# Patient Record
Sex: MALE | Race: WHITE | NOT HISPANIC OR LATINO
[De-identification: names, ages, dates, MRNs, and addresses within clinical notes are randomized per-mention and may not be internally consistent; named-entity substitution may affect disease eponyms.]

---

## 2017-01-03 ENCOUNTER — OTHER (OUTPATIENT)
Age: 67
End: 2017-01-03

## 2017-01-04 ENCOUNTER — APPOINTMENT (OUTPATIENT)
Dept: INFUSION THERAPY | Facility: CLINIC | Age: 67
End: 2017-01-04

## 2017-01-04 ENCOUNTER — APPOINTMENT (OUTPATIENT)
Dept: HEMATOLOGY ONCOLOGY | Facility: CLINIC | Age: 67
End: 2017-01-04

## 2017-01-04 VITALS
TEMPERATURE: 97.6 F | HEART RATE: 88 BPM | DIASTOLIC BLOOD PRESSURE: 75 MMHG | BODY MASS INDEX: 25.07 KG/M2 | SYSTOLIC BLOOD PRESSURE: 136 MMHG | WEIGHT: 156 LBS | HEIGHT: 66 IN

## 2017-01-05 LAB
ALBUMIN SERPL-MCNC: 3.7 G/DL
ALBUMIN/GLOB SERPL: 1.48
ALP SERPL-CCNC: 54 IU/L
ALT SERPL-CCNC: 8 IU/L
ANION GAP SERPL CALC-SCNC: 13 MEQ/L
AST SERPL-CCNC: 13 IU/L
BASOPHILS # BLD: 0.02 TH/MM3
BASOPHILS NFR BLD: 0.2 %
BILIRUB SERPL-MCNC: 0.6 MG/DL
BUN SERPL-MCNC: 75 MG/DL
BUN/CREAT SERPL: 11.6 %
CALCIUM SERPL-MCNC: 9.2 MG/DL
CHLORIDE SERPL-SCNC: 104 MEQ/L
CO2 SERPL-SCNC: 22 MEQ/L
CREAT SERPL-MCNC: 6.45 MG/DL
EOSINOPHIL # BLD: 0.23 TH/MM3
EOSINOPHIL NFR BLD: 2.8 %
ERYTHROCYTE [DISTWIDTH] IN BLOOD BY AUTOMATED COUNT: 15.5 %
GFR SERPL CREATININE-BSD FRML MDRD: 9
GLUCOSE SERPL-MCNC: 87 MG/DL
GRANULOCYTES # BLD: 5.47 TH/MM3
GRANULOCYTES NFR BLD: 66.6 %
HCT VFR BLD AUTO: 23.6 %
HGB BLD-MCNC: 7.8 G/DL
IMM GRANULOCYTES # BLD: 0.05 TH/MM3
IMM GRANULOCYTES NFR BLD: 0.6 %
LYMPHOCYTES # BLD: 1.61 TH/MM3
LYMPHOCYTES NFR BLD: 19.6 %
MCH RBC QN AUTO: 28.2 PG
MCHC RBC AUTO-ENTMCNC: 33.1 G/DL
MCV RBC AUTO: 85.2 FL
MONOCYTES # BLD: 0.84 TH/MM3
MONOCYTES NFR BLD: 10.2 %
PLATELET # BLD: 157 TH/MM3
PMV BLD AUTO: 10.8 FL
POTASSIUM SERPL-SCNC: 5.2 MMOL/L
PROT SERPL-MCNC: 6.2 G/DL
RBC # BLD AUTO: 2.77 MIL/MM3
SODIUM SERPL-SCNC: 139 MEQ/L
WBC # BLD: 8.22 TH/MM3

## 2017-01-06 LAB
KAPPA LC FREE SER-MCNC: 40.3 MG/L
KAPPA LC FREE/LAMBDA FREE SER: 0.79
LAMBDA LC FREE SERPL-MCNC: 51.2 MG/L

## 2017-01-12 ENCOUNTER — APPOINTMENT (OUTPATIENT)
Dept: INFUSION THERAPY | Facility: CLINIC | Age: 67
End: 2017-01-12

## 2017-01-12 LAB
ALBUMIN MFR SERPL ELPH: 60 %
ALBUMIN SERPL ELPH-MCNC: 3.8 G/DL
ALBUMIN/GLOB SERPL ELPH: 1.5 ZZ
ALPHA1 GLOB MFR SERPL ELPH: 6.5 %
ALPHA1 GLOB SERPL ELPH-MCNC: 0.4 G/DL
ALPHA2 GLOB MFR SERPL ELPH: 13.5 %
ALPHA2 GLOB SERPL ELPH-MCNC: 0.9 G/DL
B-GLOBULIN SERPL ELPH-MCNC: 0.6 G/DL
BASOPHILS # BLD: 0.04 TH/MM3
BASOPHILS NFR BLD: 0.5 %
BETA1 GLOB MFR SERPL ELPH: 9.7 %
EOSINOPHIL # BLD: 0.25 TH/MM3
EOSINOPHIL NFR BLD: 3.2 %
ERYTHROCYTE [DISTWIDTH] IN BLOOD BY AUTOMATED COUNT: 15.3 %
GAMMA GLOB MFR SERPL ELPH: 10.3 %
GAMMA GLOB SERPL ELPH-MCNC: 0.7 G/DL
GRANULOCYTES # BLD: 5.26 TH/MM3
GRANULOCYTES NFR BLD: 67.5 %
HCT VFR BLD AUTO: 23.7 %
HGB BLD-MCNC: 7.8 G/DL
IMM GRANULOCYTES # BLD: 0.01 TH/MM3
IMM GRANULOCYTES NFR BLD: 0.1 %
LYMPHOCYTES # BLD: 1.47 TH/MM3
LYMPHOCYTES NFR BLD: 18.8 %
MCH RBC QN AUTO: 28.1 PG
MCHC RBC AUTO-ENTMCNC: 32.9 G/DL
MCV RBC AUTO: 85.3 FL
MONOCYTES # BLD: 0.77 TH/MM3
MONOCYTES NFR BLD: 9.9 %
PLATELET # BLD: 153 TH/MM3
PMV BLD AUTO: 10.4 FL
PROT PATTERN SERPL ELPH-IMP: 6.4 G/DL
PROT PATTERN SERPL ELPH-IMP: NORMAL
PROT SERPL-MCNC: 6.4 G/DL
RBC # BLD AUTO: 2.78 MIL/MM3
WBC # BLD: 7.8 TH/MM3

## 2017-01-18 ENCOUNTER — APPOINTMENT (OUTPATIENT)
Dept: HEMATOLOGY ONCOLOGY | Facility: CLINIC | Age: 67
End: 2017-01-18

## 2017-01-19 ENCOUNTER — APPOINTMENT (OUTPATIENT)
Dept: INFUSION THERAPY | Facility: CLINIC | Age: 67
End: 2017-01-19

## 2017-01-19 LAB
BASOPHILS # BLD: 0.04 TH/MM3
BASOPHILS NFR BLD: 0.5 %
EOSINOPHIL # BLD: 0.21 TH/MM3
EOSINOPHIL NFR BLD: 2.8 %
ERYTHROCYTE [DISTWIDTH] IN BLOOD BY AUTOMATED COUNT: 15.3 %
GRANULOCYTES # BLD: 5.04 TH/MM3
GRANULOCYTES NFR BLD: 67.3 %
HCT VFR BLD AUTO: 22.3 %
HGB BLD-MCNC: 7.5 G/DL
IMM GRANULOCYTES # BLD: 0.02 TH/MM3
IMM GRANULOCYTES NFR BLD: 0.3 %
LYMPHOCYTES # BLD: 1.37 TH/MM3
LYMPHOCYTES NFR BLD: 18.3 %
MCH RBC QN AUTO: 27.8 PG
MCHC RBC AUTO-ENTMCNC: 33.6 G/DL
MCV RBC AUTO: 82.6 FL
MONOCYTES # BLD: 0.81 TH/MM3
MONOCYTES NFR BLD: 10.8 %
PLATELET # BLD: 152 TH/MM3
PMV BLD AUTO: 9.9 FL
RBC # BLD AUTO: 2.7 MIL/MM3
WBC # BLD: 7.49 TH/MM3

## 2017-01-26 ENCOUNTER — APPOINTMENT (OUTPATIENT)
Dept: INFUSION THERAPY | Facility: CLINIC | Age: 67
End: 2017-01-26

## 2017-01-27 LAB
BASOPHILS # BLD: 0.04 TH/MM3
BASOPHILS NFR BLD: 0.5 %
EOSINOPHIL # BLD: 0.22 TH/MM3
EOSINOPHIL NFR BLD: 2.9 %
ERYTHROCYTE [DISTWIDTH] IN BLOOD BY AUTOMATED COUNT: 15.4 %
GRANULOCYTES # BLD: 5.17 TH/MM3
GRANULOCYTES NFR BLD: 67.9 %
HCT VFR BLD AUTO: 22.8 %
HGB BLD-MCNC: 7.8 G/DL
IMM GRANULOCYTES # BLD: 0.07 TH/MM3
IMM GRANULOCYTES NFR BLD: 0.9 %
LYMPHOCYTES # BLD: 1.39 TH/MM3
LYMPHOCYTES NFR BLD: 18.2 %
MCH RBC QN AUTO: 28.6 PG
MCHC RBC AUTO-ENTMCNC: 34.2 G/DL
MCV RBC AUTO: 83.5 FL
MONOCYTES # BLD: 0.73 TH/MM3
MONOCYTES NFR BLD: 9.6 %
PLATELET # BLD: 157 TH/MM3
PMV BLD AUTO: 10.2 FL
RBC # BLD AUTO: 2.73 MIL/MM3
WBC # BLD: 7.62 TH/MM3

## 2017-02-01 ENCOUNTER — RESULT REVIEW (OUTPATIENT)
Age: 67
End: 2017-02-01

## 2017-02-01 ENCOUNTER — APPOINTMENT (OUTPATIENT)
Dept: INFUSION THERAPY | Facility: CLINIC | Age: 67
End: 2017-02-01

## 2017-02-01 ENCOUNTER — APPOINTMENT (OUTPATIENT)
Dept: HEMATOLOGY ONCOLOGY | Facility: CLINIC | Age: 67
End: 2017-02-01

## 2017-02-01 VITALS
HEIGHT: 66 IN | DIASTOLIC BLOOD PRESSURE: 72 MMHG | TEMPERATURE: 98.6 F | WEIGHT: 156 LBS | BODY MASS INDEX: 25.07 KG/M2 | SYSTOLIC BLOOD PRESSURE: 143 MMHG | HEART RATE: 78 BPM | RESPIRATION RATE: 14 BRPM

## 2017-02-01 RX ORDER — CALCITRIOL 0.25 UG/1
0.25 CAPSULE, LIQUID FILLED ORAL
Qty: 90 | Refills: 0 | Status: ACTIVE | COMMUNITY
Start: 2016-04-04

## 2017-02-02 LAB
ALBUMIN MFR SERPL ELPH: 61.7 %
ALBUMIN SERPL ELPH-MCNC: 3.8 G/DL
ALBUMIN SERPL-MCNC: 3.6 G/DL
ALBUMIN/GLOB SERPL ELPH: 1.6 ZZ
ALBUMIN/GLOB SERPL: 1.71
ALP SERPL-CCNC: 51 IU/L
ALPHA1 GLOB MFR SERPL ELPH: 5.8 %
ALPHA1 GLOB SERPL ELPH-MCNC: 0.4 G/DL
ALPHA2 GLOB MFR SERPL ELPH: 13.1 %
ALPHA2 GLOB SERPL ELPH-MCNC: 0.8 G/DL
ALT SERPL-CCNC: 7 IU/L
ANION GAP SERPL CALC-SCNC: 11 MEQ/L
AST SERPL-CCNC: 13 IU/L
B-GLOBULIN SERPL ELPH-MCNC: 0.6 G/DL
BASOPHILS # BLD: 0.05 TH/MM3
BASOPHILS NFR BLD: 0.6 %
BETA1 GLOB MFR SERPL ELPH: 9.5 %
BILIRUB SERPL-MCNC: 0.4 MG/DL
BUN SERPL-MCNC: 59 MG/DL
BUN/CREAT SERPL: 9.5 %
CALCIUM SERPL-MCNC: 9.4 MG/DL
CHLORIDE SERPL-SCNC: 106 MEQ/L
CO2 SERPL-SCNC: 22 MEQ/L
CREAT SERPL-MCNC: 6.19 MG/DL
EOSINOPHIL # BLD: 0.24 TH/MM3
EOSINOPHIL NFR BLD: 3 %
ERYTHROCYTE [DISTWIDTH] IN BLOOD BY AUTOMATED COUNT: 15.6 %
GAMMA GLOB MFR SERPL ELPH: 9.9 %
GAMMA GLOB SERPL ELPH-MCNC: 0.6 G/DL
GFR SERPL CREATININE-BSD FRML MDRD: 9
GLUCOSE SERPL-MCNC: 75 MG/DL
GRANULOCYTES # BLD: 5.3 TH/MM3
GRANULOCYTES NFR BLD: 65.6 %
HCT VFR BLD AUTO: 23.8 %
HGB BLD-MCNC: 7.7 G/DL
IMM GRANULOCYTES # BLD: 0.11 TH/MM3
IMM GRANULOCYTES NFR BLD: 1.4 %
INTERPRETATION SERPL IFE-IMP: NORMAL
KAPPA LC FREE SER-MCNC: 39.2 MG/L
KAPPA LC FREE/LAMBDA FREE SER: 0.69
LAMBDA LC FREE SERPL-MCNC: 56.8 MG/L
LYMPHOCYTES # BLD: 1.47 TH/MM3
LYMPHOCYTES NFR BLD: 18.2 %
MCH RBC QN AUTO: 28.3 PG
MCHC RBC AUTO-ENTMCNC: 32.4 G/DL
MCV RBC AUTO: 87.5 FL
MONOCYTES # BLD: 0.9 TH/MM3
MONOCYTES NFR BLD: 11.2 %
PLATELET # BLD: 148 TH/MM3
PMV BLD AUTO: 10.2 FL
POTASSIUM SERPL-SCNC: 5.4 MMOL/L
PROT PATTERN SERPL ELPH-IMP: 6.2 G/DL
PROT PATTERN SERPL ELPH-IMP: NORMAL
PROT SERPL-MCNC: 5.7 G/DL
PROT SERPL-MCNC: 6.2 G/DL
RBC # BLD AUTO: 2.72 MIL/MM3
SODIUM SERPL-SCNC: 139 MEQ/L
WBC # BLD: 8.07 TH/MM3

## 2017-02-10 ENCOUNTER — APPOINTMENT (OUTPATIENT)
Dept: INFUSION THERAPY | Facility: CLINIC | Age: 67
End: 2017-02-10

## 2017-02-10 LAB
BASOPHILS # BLD: 0.05 TH/MM3
BASOPHILS NFR BLD: 0.6 %
EOSINOPHIL # BLD: 0.2 TH/MM3
EOSINOPHIL NFR BLD: 2.3 %
ERYTHROCYTE [DISTWIDTH] IN BLOOD BY AUTOMATED COUNT: 15.3 %
GRANULOCYTES # BLD: 5.78 TH/MM3
GRANULOCYTES NFR BLD: 65.4 %
HCT VFR BLD AUTO: 24.9 %
HGB BLD-MCNC: 8.3 G/DL
IMM GRANULOCYTES # BLD: 0.07 TH/MM3
IMM GRANULOCYTES NFR BLD: 0.8 %
LYMPHOCYTES # BLD: 1.8 TH/MM3
LYMPHOCYTES NFR BLD: 20.4 %
MCH RBC QN AUTO: 28.1 PG
MCHC RBC AUTO-ENTMCNC: 33.3 G/DL
MCV RBC AUTO: 84.4 FL
MONOCYTES # BLD: 0.93 TH/MM3
MONOCYTES NFR BLD: 10.5 %
PLATELET # BLD: 160 TH/MM3
PMV BLD AUTO: 10.1 FL
RBC # BLD AUTO: 2.95 MIL/MM3
WBC # BLD: 8.83 TH/MM3

## 2017-02-16 ENCOUNTER — APPOINTMENT (OUTPATIENT)
Dept: INFUSION THERAPY | Facility: CLINIC | Age: 67
End: 2017-02-16

## 2017-02-16 LAB
BASOPHILS # BLD: 0.04 TH/MM3
BASOPHILS NFR BLD: 0.5 %
EOSINOPHIL # BLD: 0.24 TH/MM3
EOSINOPHIL NFR BLD: 3 %
ERYTHROCYTE [DISTWIDTH] IN BLOOD BY AUTOMATED COUNT: 15.4 %
GRANULOCYTES # BLD: 5.48 TH/MM3
GRANULOCYTES NFR BLD: 69 %
HCT VFR BLD AUTO: 23.1 %
HGB BLD-MCNC: 7.6 G/DL
IMM GRANULOCYTES # BLD: 0.02 TH/MM3
IMM GRANULOCYTES NFR BLD: 0.3 %
LYMPHOCYTES # BLD: 1.24 TH/MM3
LYMPHOCYTES NFR BLD: 15.6 %
MCH RBC QN AUTO: 28.5 PG
MCHC RBC AUTO-ENTMCNC: 32.9 G/DL
MCV RBC AUTO: 86.5 FL
MONOCYTES # BLD: 0.92 TH/MM3
MONOCYTES NFR BLD: 11.6 %
PLATELET # BLD: 177 TH/MM3
PMV BLD AUTO: 10.6 FL
RBC # BLD AUTO: 2.67 MIL/MM3
WBC # BLD: 7.94 TH/MM3

## 2017-02-23 ENCOUNTER — APPOINTMENT (OUTPATIENT)
Dept: INFUSION THERAPY | Facility: CLINIC | Age: 67
End: 2017-02-23

## 2017-03-02 ENCOUNTER — APPOINTMENT (OUTPATIENT)
Dept: INFUSION THERAPY | Facility: CLINIC | Age: 67
End: 2017-03-02

## 2017-03-02 ENCOUNTER — RESULT REVIEW (OUTPATIENT)
Age: 67
End: 2017-03-02

## 2017-03-09 ENCOUNTER — OTHER (OUTPATIENT)
Age: 67
End: 2017-03-09

## 2017-03-09 ENCOUNTER — APPOINTMENT (OUTPATIENT)
Dept: INFUSION THERAPY | Facility: CLINIC | Age: 67
End: 2017-03-09

## 2017-03-09 LAB
BASOPHILS # BLD: 0.05 TH/MM3
BASOPHILS NFR BLD: 0.6 %
EOSINOPHIL # BLD: 0.26 TH/MM3
EOSINOPHIL NFR BLD: 3.1 %
ERYTHROCYTE [DISTWIDTH] IN BLOOD BY AUTOMATED COUNT: 15.4 %
GRANULOCYTES # BLD: 5.92 TH/MM3
GRANULOCYTES NFR BLD: 70.1 %
HCT VFR BLD AUTO: 23.4 %
HGB BLD-MCNC: 7.9 G/DL
IMM GRANULOCYTES # BLD: 0.07 TH/MM3
IMM GRANULOCYTES NFR BLD: 0.8 %
LYMPHOCYTES # BLD: 1.38 TH/MM3
LYMPHOCYTES NFR BLD: 16.3 %
MCH RBC QN AUTO: 29.2 PG
MCHC RBC AUTO-ENTMCNC: 33.8 G/DL
MCV RBC AUTO: 86.3 FL
MONOCYTES # BLD: 0.77 TH/MM3
MONOCYTES NFR BLD: 9.1 %
PLATELET # BLD: 183 TH/MM3
PMV BLD AUTO: 10.7 FL
RBC # BLD AUTO: 2.71 MIL/MM3
WBC # BLD: 8.45 TH/MM3

## 2017-03-16 ENCOUNTER — APPOINTMENT (OUTPATIENT)
Dept: INFUSION THERAPY | Facility: CLINIC | Age: 67
End: 2017-03-16

## 2017-03-23 ENCOUNTER — APPOINTMENT (OUTPATIENT)
Dept: INFUSION THERAPY | Facility: CLINIC | Age: 67
End: 2017-03-23

## 2017-03-24 LAB
BASOPHILS # BLD: 0.05 TH/MM3
BASOPHILS NFR BLD: 0.5 %
EOSINOPHIL # BLD: 0.25 TH/MM3
EOSINOPHIL NFR BLD: 2.6 %
ERYTHROCYTE [DISTWIDTH] IN BLOOD BY AUTOMATED COUNT: 15.5 %
GRANULOCYTES # BLD: 6.81 TH/MM3
GRANULOCYTES NFR BLD: 72.1 %
HCT VFR BLD AUTO: 22.3 %
HGB BLD-MCNC: 7.3 G/DL
IMM GRANULOCYTES # BLD: 0.04 TH/MM3
IMM GRANULOCYTES NFR BLD: 0.4 %
LYMPHOCYTES # BLD: 1.32 TH/MM3
LYMPHOCYTES NFR BLD: 14 %
MCH RBC QN AUTO: 28.1 PG
MCHC RBC AUTO-ENTMCNC: 32.7 G/DL
MCV RBC AUTO: 85.8 FL
MONOCYTES # BLD: 0.98 TH/MM3
MONOCYTES NFR BLD: 10.4 %
PLATELET # BLD: 217 TH/MM3
PMV BLD AUTO: 9.7 FL
RBC # BLD AUTO: 2.6 MIL/MM3
WBC # BLD: 9.45 TH/MM3

## 2017-04-05 ENCOUNTER — APPOINTMENT (OUTPATIENT)
Dept: HEMATOLOGY ONCOLOGY | Facility: CLINIC | Age: 67
End: 2017-04-05

## 2017-04-05 VITALS
TEMPERATURE: 96.9 F | DIASTOLIC BLOOD PRESSURE: 65 MMHG | HEIGHT: 66 IN | BODY MASS INDEX: 25.71 KG/M2 | WEIGHT: 160 LBS | SYSTOLIC BLOOD PRESSURE: 138 MMHG | RESPIRATION RATE: 14 BRPM | HEART RATE: 94 BPM

## 2017-04-05 LAB
BASOPHILS # BLD: 0.04 TH/MM3
BASOPHILS NFR BLD: 0.4 %
EOSINOPHIL # BLD: 0.28 TH/MM3
EOSINOPHIL NFR BLD: 2.8 %
ERYTHROCYTE [DISTWIDTH] IN BLOOD BY AUTOMATED COUNT: 15.2 %
GRANULOCYTES # BLD: 7.42 TH/MM3
GRANULOCYTES NFR BLD: 74.7 %
HCT VFR BLD AUTO: 22.7 %
HGB BLD-MCNC: 7.5 G/DL
IMM GRANULOCYTES # BLD: 0.05 TH/MM3
IMM GRANULOCYTES NFR BLD: 0.5 %
LYMPHOCYTES # BLD: 1.32 TH/MM3
LYMPHOCYTES NFR BLD: 13.3 %
MCH RBC QN AUTO: 28.1 PG
MCHC RBC AUTO-ENTMCNC: 33 G/DL
MCV RBC AUTO: 85 FL
MONOCYTES # BLD: 0.82 TH/MM3
MONOCYTES NFR BLD: 8.3 %
PLATELET # BLD: 223 TH/MM3
PMV BLD AUTO: 9.9 FL
RBC # BLD AUTO: 2.67 MIL/MM3
WBC # BLD: 9.93 TH/MM3

## 2017-04-06 ENCOUNTER — APPOINTMENT (OUTPATIENT)
Dept: INFUSION THERAPY | Facility: CLINIC | Age: 67
End: 2017-04-06

## 2017-04-06 LAB
ALBUMIN SERPL-MCNC: 3.4 G/DL
ALBUMIN/GLOB SERPL: 1.48
ALP SERPL-CCNC: 53 IU/L
ALT SERPL-CCNC: 8 IU/L
ANION GAP SERPL CALC-SCNC: 12 MEQ/L
AST SERPL-CCNC: 12 IU/L
BASOPHILS # BLD: 0.08 TH/MM3
BASOPHILS NFR BLD: 0.9 %
BILIRUB SERPL-MCNC: 0.6 MG/DL
BUN SERPL-MCNC: 59 MG/DL
BUN/CREAT SERPL: 9.3 %
CALCIUM SERPL-MCNC: 9.3 MG/DL
CHLORIDE SERPL-SCNC: 107 MEQ/L
CO2 SERPL-SCNC: 22 MEQ/L
CREAT SERPL-MCNC: 6.33 MG/DL
EOSINOPHIL # BLD: 0.24 TH/MM3
EOSINOPHIL NFR BLD: 2.8 %
ERYTHROCYTE [DISTWIDTH] IN BLOOD BY AUTOMATED COUNT: 15.3 %
GFR SERPL CREATININE-BSD FRML MDRD: 9
GLUCOSE SERPL-MCNC: 87 MG/DL
GRANULOCYTES # BLD: 6.02 TH/MM3
GRANULOCYTES NFR BLD: 69.8 %
HCT VFR BLD AUTO: 23.2 %
HGB BLD-MCNC: 7.5 G/DL
IMM GRANULOCYTES # BLD: 0.1 TH/MM3
IMM GRANULOCYTES NFR BLD: 1.2 %
LYMPHOCYTES # BLD: 1.3 TH/MM3
LYMPHOCYTES NFR BLD: 15.1 %
MCH RBC QN AUTO: 27.4 PG
MCHC RBC AUTO-ENTMCNC: 32.3 G/DL
MCV RBC AUTO: 84.7 FL
MONOCYTES # BLD: 0.88 TH/MM3
MONOCYTES NFR BLD: 10.2 %
PLATELET # BLD: 231 TH/MM3
PMV BLD AUTO: 10 FL
POTASSIUM SERPL-SCNC: 4.7 MMOL/L
PROT SERPL-MCNC: 5.7 G/DL
RBC # BLD AUTO: 2.74 MIL/MM3
SODIUM SERPL-SCNC: 141 MEQ/L
WBC # BLD: 8.62 TH/MM3

## 2017-04-10 LAB
ALBUMIN MFR SERPL ELPH: 60.8 %
ALBUMIN SERPL ELPH-MCNC: 3.6 G/DL
ALBUMIN/GLOB SERPL ELPH: 1.5 ZZ
ALPHA1 GLOB MFR SERPL ELPH: 6.1 %
ALPHA1 GLOB SERPL ELPH-MCNC: 0.4 G/DL
ALPHA2 GLOB MFR SERPL ELPH: 14.4 %
ALPHA2 GLOB SERPL ELPH-MCNC: 0.9 G/DL
B-GLOBULIN SERPL ELPH-MCNC: 0.6 G/DL
BETA1 GLOB MFR SERPL ELPH: 9.3 %
GAMMA GLOB MFR SERPL ELPH: 9.4 %
GAMMA GLOB SERPL ELPH-MCNC: 0.6 G/DL
PROT PATTERN SERPL ELPH-IMP: 6 G/DL
PROT PATTERN SERPL ELPH-IMP: NORMAL
PROT SERPL-MCNC: 6 G/DL

## 2017-04-13 ENCOUNTER — APPOINTMENT (OUTPATIENT)
Dept: INFUSION THERAPY | Facility: CLINIC | Age: 67
End: 2017-04-13

## 2017-04-13 LAB
ALBUMIN SERPL-MCNC: 11
ALBUMIN/GLOB UR ELPH: 0.12
ALPHA1 GLOB ?TM MFR UR ELPH: 15.6
ALPHA2 GLOB ?TM MFR UR ELPH: 25.6
B-GLOBULIN ?TM MFR UR ELPH: 28
BASOPHILS # BLD: 0.03 TH/MM3
BASOPHILS NFR BLD: 0.3 %
CREAT UR-MCNC: 93 MG/DL
EOSINOPHIL # BLD: 0.25 TH/MM3
EOSINOPHIL NFR BLD: 2.9 %
ERYTHROCYTE [DISTWIDTH] IN BLOOD BY AUTOMATED COUNT: 15.2 %
GAMMA GLOB ?TM MFR UR ELPH: 19.8
GRANULOCYTES # BLD: 6.52 TH/MM3
GRANULOCYTES NFR BLD: 74.7 %
HCT VFR BLD AUTO: 24 %
HGB BLD-MCNC: 7.8 G/DL
IMM GRANULOCYTES # BLD: 0.04 TH/MM3
IMM GRANULOCYTES NFR BLD: 0.5 %
INTERPRETATION SERPL IFE-IMP: NORMAL
KAPPA LC FREE SER-MCNC: 37.6 MG/L
KAPPA LC FREE/LAMBDA FREE SER: 0.28
LAMBDA LC FREE SERPL-MCNC: 134.2 MG/L
LYMPHOCYTES # BLD: 1.24 TH/MM3
LYMPHOCYTES NFR BLD: 14.2 %
MCH RBC QN AUTO: 27.5 PG
MCHC RBC AUTO-ENTMCNC: 32.5 G/DL
MCV RBC AUTO: 84.5 FL
MONOCYTES # BLD: 0.65 TH/MM3
MONOCYTES NFR BLD: 7.4 %
PLATELET # BLD: 186 TH/MM3
PMV BLD AUTO: 10.6 FL
PROT PATTERN UR ELPH-IMP: NORMAL
PROT UR-MCNC: 183 MG/L
PROT/CREAT UR: 197
RBC # BLD AUTO: 2.84 MIL/MM3
WBC # BLD: 8.73 TH/MM3

## 2017-04-14 ENCOUNTER — APPOINTMENT (OUTPATIENT)
Dept: HEMATOLOGY ONCOLOGY | Facility: CLINIC | Age: 67
End: 2017-04-14

## 2017-04-14 ENCOUNTER — RESULT REVIEW (OUTPATIENT)
Age: 67
End: 2017-04-14

## 2017-04-17 ENCOUNTER — RESULT REVIEW (OUTPATIENT)
Age: 67
End: 2017-04-17

## 2017-04-18 LAB
KAPPA LC FREE SER-MCNC: 37.4 MG/L
KAPPA LC FREE/LAMBDA FREE SER: 0.28
LAMBDA LC FREE SERPL-MCNC: 133.9 MG/L

## 2017-04-20 ENCOUNTER — APPOINTMENT (OUTPATIENT)
Dept: INFUSION THERAPY | Facility: CLINIC | Age: 67
End: 2017-04-20

## 2017-04-24 ENCOUNTER — OUTPATIENT (OUTPATIENT)
Dept: OUTPATIENT SERVICES | Facility: HOSPITAL | Age: 67
LOS: 1 days | Discharge: HOME | End: 2017-04-24

## 2017-04-27 ENCOUNTER — APPOINTMENT (OUTPATIENT)
Dept: INFUSION THERAPY | Facility: CLINIC | Age: 67
End: 2017-04-27

## 2017-04-28 LAB
BASOPHILS # BLD: 0.06 TH/MM3
BASOPHILS NFR BLD: 0.7 %
EOSINOPHIL # BLD: 0.23 TH/MM3
EOSINOPHIL NFR BLD: 2.8 %
ERYTHROCYTE [DISTWIDTH] IN BLOOD BY AUTOMATED COUNT: 15.6 %
GRANULOCYTES # BLD: 5.69 TH/MM3
GRANULOCYTES NFR BLD: 69 %
HCT VFR BLD AUTO: 24.1 %
HGB BLD-MCNC: 7.8 G/DL
IMM GRANULOCYTES # BLD: 0.14 TH/MM3
IMM GRANULOCYTES NFR BLD: 1.7 %
LYMPHOCYTES # BLD: 1.29 TH/MM3
LYMPHOCYTES NFR BLD: 15.7 %
MCH RBC QN AUTO: 27.6 PG
MCHC RBC AUTO-ENTMCNC: 32.4 G/DL
MCV RBC AUTO: 85.2 FL
MONOCYTES # BLD: 0.83 TH/MM3
MONOCYTES NFR BLD: 10.1 %
PLATELET # BLD: 207 TH/MM3
PMV BLD AUTO: 10.7 FL
RBC # BLD AUTO: 2.83 MIL/MM3
WBC # BLD: 8.24 TH/MM3

## 2017-05-01 ENCOUNTER — APPOINTMENT (OUTPATIENT)
Dept: HEMATOLOGY ONCOLOGY | Facility: CLINIC | Age: 67
End: 2017-05-01

## 2017-05-01 VITALS
BODY MASS INDEX: 25.88 KG/M2 | DIASTOLIC BLOOD PRESSURE: 74 MMHG | WEIGHT: 161 LBS | RESPIRATION RATE: 14 BRPM | SYSTOLIC BLOOD PRESSURE: 144 MMHG | TEMPERATURE: 96.8 F | HEART RATE: 82 BPM | HEIGHT: 66 IN

## 2017-05-02 LAB
ALBUMIN SERPL-MCNC: 3.4 G/DL
ALBUMIN/GLOB SERPL: 1.42
ALP SERPL-CCNC: 45 IU/L
ALT SERPL-CCNC: 8 IU/L
ANION GAP SERPL CALC-SCNC: 9 MEQ/L
AST SERPL-CCNC: 14 IU/L
BASOPHILS # BLD: 0.04 TH/MM3
BASOPHILS NFR BLD: 0.6 %
BILIRUB SERPL-MCNC: 0.7 MG/DL
BUN SERPL-MCNC: 67 MG/DL
BUN/CREAT SERPL: 10.7 %
CALCIUM SERPL-MCNC: 9.4 MG/DL
CHLORIDE SERPL-SCNC: 108 MEQ/L
CO2 SERPL-SCNC: 21 MEQ/L
CREAT SERPL-MCNC: 6.29 MG/DL
EOSINOPHIL # BLD: 0.17 TH/MM3
EOSINOPHIL NFR BLD: 2.4 %
ERYTHROCYTE [DISTWIDTH] IN BLOOD BY AUTOMATED COUNT: 15.7 %
GFR SERPL CREATININE-BSD FRML MDRD: 9
GLUCOSE SERPL-MCNC: 77 MG/DL
GRANULOCYTES # BLD: 5.28 TH/MM3
GRANULOCYTES NFR BLD: 73.1 %
HCT VFR BLD AUTO: 23.7 %
HGB BLD-MCNC: 7.6 G/DL
IMM GRANULOCYTES # BLD: 0.01 TH/MM3
IMM GRANULOCYTES NFR BLD: 0.1 %
LYMPHOCYTES # BLD: 1.11 TH/MM3
LYMPHOCYTES NFR BLD: 15.4 %
MCH RBC QN AUTO: 27.3 PG
MCHC RBC AUTO-ENTMCNC: 32.1 G/DL
MCV RBC AUTO: 85.3 FL
MONOCYTES # BLD: 0.61 TH/MM3
MONOCYTES NFR BLD: 8.4 %
PLATELET # BLD: 206 TH/MM3
PMV BLD AUTO: 10.1 FL
POTASSIUM SERPL-SCNC: 4.9 MMOL/L
PROT SERPL-MCNC: 5.8 G/DL
RBC # BLD AUTO: 2.78 MIL/MM3
SODIUM SERPL-SCNC: 138 MEQ/L
WBC # BLD: 7.22 TH/MM3

## 2017-05-04 ENCOUNTER — APPOINTMENT (OUTPATIENT)
Dept: INFUSION THERAPY | Facility: CLINIC | Age: 67
End: 2017-05-04

## 2017-05-04 LAB
ALBUMIN MFR SERPL ELPH: 61.8 %
ALBUMIN SERPL ELPH-MCNC: 3.7 G/DL
ALBUMIN/GLOB SERPL ELPH: 1.6 ZZ
ALPHA1 GLOB MFR SERPL ELPH: 6 %
ALPHA1 GLOB SERPL ELPH-MCNC: 0.4 G/DL
ALPHA2 GLOB MFR SERPL ELPH: 13.7 %
ALPHA2 GLOB SERPL ELPH-MCNC: 0.8 G/DL
B-GLOBULIN SERPL ELPH-MCNC: 0.6 G/DL
BASOPHILS # BLD: 0.03 TH/MM3
BASOPHILS # BLD: 0.03 TH/MM3
BASOPHILS NFR BLD: 0.3 %
BASOPHILS NFR BLD: 0.4 %
BETA1 GLOB MFR SERPL ELPH: 9.4 %
EOSINOPHIL # BLD: 0.22 TH/MM3
EOSINOPHIL # BLD: 0.24 TH/MM3
EOSINOPHIL NFR BLD: 2.2 %
EOSINOPHIL NFR BLD: 2.9 %
ERYTHROCYTE [DISTWIDTH] IN BLOOD BY AUTOMATED COUNT: 15.3 %
ERYTHROCYTE [DISTWIDTH] IN BLOOD BY AUTOMATED COUNT: 15.7 %
GAMMA GLOB MFR SERPL ELPH: 9.1 %
GAMMA GLOB SERPL ELPH-MCNC: 0.5 G/DL
GRANULOCYTES # BLD: 5.91 TH/MM3
GRANULOCYTES # BLD: 6.97 TH/MM3
GRANULOCYTES NFR BLD: 71.4 %
GRANULOCYTES NFR BLD: 72.3 %
HCT VFR BLD AUTO: 21.8 %
HCT VFR BLD AUTO: 22.6 %
HGB BLD-MCNC: 7.1 G/DL
HGB BLD-MCNC: 7.3 G/DL
IMM GRANULOCYTES # BLD: 0.05 TH/MM3
IMM GRANULOCYTES # BLD: 0.07 TH/MM3
IMM GRANULOCYTES NFR BLD: 0.6 %
IMM GRANULOCYTES NFR BLD: 0.7 %
KAPPA LC FREE SER-MCNC: 38.3 MG/L
KAPPA LC FREE/LAMBDA FREE SER: 0.24
LAMBDA LC FREE SERPL-MCNC: 158.5 MG/L
LYMPHOCYTES # BLD: 1.18 TH/MM3
LYMPHOCYTES # BLD: 1.55 TH/MM3
LYMPHOCYTES NFR BLD: 14.4 %
LYMPHOCYTES NFR BLD: 15.8 %
MCH RBC QN AUTO: 28 PG
MCH RBC QN AUTO: 28 PG
MCHC RBC AUTO-ENTMCNC: 32.3 G/DL
MCHC RBC AUTO-ENTMCNC: 32.6 G/DL
MCV RBC AUTO: 85.8 FL
MCV RBC AUTO: 86.6 FL
MONOCYTES # BLD: 0.77 TH/MM3
MONOCYTES # BLD: 0.94 TH/MM3
MONOCYTES NFR BLD: 9.4 %
MONOCYTES NFR BLD: 9.6 %
PLATELET # BLD: 157 TH/MM3
PLATELET # BLD: 183 TH/MM3
PMV BLD AUTO: 10 FL
PMV BLD AUTO: 10.2 FL
PROT PATTERN SERPL ELPH-IMP: 6 G/DL
PROT PATTERN SERPL ELPH-IMP: NORMAL
PROT SERPL-MCNC: 6 G/DL
RBC # BLD AUTO: 2.54 MIL/MM3
RBC # BLD AUTO: 2.61 MIL/MM3
WBC # BLD: 8.18 TH/MM3
WBC # BLD: 9.78 TH/MM3

## 2017-05-11 ENCOUNTER — APPOINTMENT (OUTPATIENT)
Dept: INFUSION THERAPY | Facility: CLINIC | Age: 67
End: 2017-05-11

## 2017-05-11 VITALS
SYSTOLIC BLOOD PRESSURE: 139 MMHG | HEART RATE: 79 BPM | TEMPERATURE: 97.6 F | DIASTOLIC BLOOD PRESSURE: 83 MMHG | RESPIRATION RATE: 18 BRPM

## 2017-05-12 LAB
BASOPHILS # BLD: 0.05 TH/MM3
BASOPHILS NFR BLD: 0.6 %
EOSINOPHIL # BLD: 0.22 TH/MM3
EOSINOPHIL NFR BLD: 2.8 %
ERYTHROCYTE [DISTWIDTH] IN BLOOD BY AUTOMATED COUNT: 15.6 %
GRANULOCYTES # BLD: 5.69 TH/MM3
GRANULOCYTES NFR BLD: 71.3 %
HCT VFR BLD AUTO: 21.8 %
HGB BLD-MCNC: 7.1 G/DL
IMM GRANULOCYTES # BLD: 0.04 TH/MM3
IMM GRANULOCYTES NFR BLD: 0.5 %
LYMPHOCYTES # BLD: 1.31 TH/MM3
LYMPHOCYTES NFR BLD: 16.4 %
MCH RBC QN AUTO: 27.8 PG
MCHC RBC AUTO-ENTMCNC: 32.6 G/DL
MCV RBC AUTO: 85.5 FL
MONOCYTES # BLD: 0.67 TH/MM3
MONOCYTES NFR BLD: 8.4 %
PLATELET # BLD: 179 TH/MM3
PMV BLD AUTO: 10.7 FL
RBC # BLD AUTO: 2.55 MIL/MM3
WBC # BLD: 7.98 TH/MM3

## 2017-05-18 ENCOUNTER — APPOINTMENT (OUTPATIENT)
Dept: INFUSION THERAPY | Facility: CLINIC | Age: 67
End: 2017-05-18

## 2017-05-18 LAB
BASOPHILS # BLD: 0.02 TH/MM3
BASOPHILS NFR BLD: 0.2 %
EOSINOPHIL # BLD: 0.16 TH/MM3
EOSINOPHIL NFR BLD: 1.8 %
ERYTHROCYTE [DISTWIDTH] IN BLOOD BY AUTOMATED COUNT: 16 %
GRANULOCYTES # BLD: 6.73 TH/MM3
GRANULOCYTES NFR BLD: 77.3 %
HCT VFR BLD AUTO: 21.2 %
HGB BLD-MCNC: 6.9 G/DL
IMM GRANULOCYTES # BLD: 0.06 TH/MM3
IMM GRANULOCYTES NFR BLD: 0.7 %
LYMPHOCYTES # BLD: 1.11 TH/MM3
LYMPHOCYTES NFR BLD: 12.7 %
MCH RBC QN AUTO: 27.8 PG
MCHC RBC AUTO-ENTMCNC: 32.5 G/DL
MCV RBC AUTO: 85.5 FL
MONOCYTES # BLD: 0.64 TH/MM3
MONOCYTES NFR BLD: 7.3 %
PLATELET # BLD: 164 TH/MM3
PMV BLD AUTO: 10.9 FL
RBC # BLD AUTO: 2.48 MIL/MM3
WBC # BLD: 8.72 TH/MM3

## 2017-05-25 ENCOUNTER — APPOINTMENT (OUTPATIENT)
Dept: INFUSION THERAPY | Facility: CLINIC | Age: 67
End: 2017-05-25

## 2017-05-25 LAB
BASOPHILS # BLD: 0.03 TH/MM3
BASOPHILS NFR BLD: 0.4 %
EOSINOPHIL # BLD: 0.22 TH/MM3
EOSINOPHIL NFR BLD: 2.8 %
ERYTHROCYTE [DISTWIDTH] IN BLOOD BY AUTOMATED COUNT: 15.9 %
GRANULOCYTES # BLD: 5.52 TH/MM3
GRANULOCYTES NFR BLD: 70.6 %
HCT VFR BLD AUTO: 21.9 %
HGB BLD-MCNC: 7.1 G/DL
IMM GRANULOCYTES # BLD: 0.09 TH/MM3
IMM GRANULOCYTES NFR BLD: 1.2 %
LYMPHOCYTES # BLD: 1.23 TH/MM3
LYMPHOCYTES NFR BLD: 15.7 %
MCH RBC QN AUTO: 27.5 PG
MCHC RBC AUTO-ENTMCNC: 32.4 G/DL
MCV RBC AUTO: 84.9 FL
MONOCYTES # BLD: 0.73 TH/MM3
MONOCYTES NFR BLD: 9.3 %
PLATELET # BLD: 174 TH/MM3
PMV BLD AUTO: 10.2 FL
RBC # BLD AUTO: 2.58 MIL/MM3
WBC # BLD: 7.82 TH/MM3

## 2017-06-01 ENCOUNTER — APPOINTMENT (OUTPATIENT)
Dept: INFUSION THERAPY | Facility: CLINIC | Age: 67
End: 2017-06-01

## 2017-06-01 LAB
BASOPHILS # BLD: 0.04 TH/MM3
BASOPHILS NFR BLD: 0.5 %
EOSINOPHIL # BLD: 0.23 TH/MM3
EOSINOPHIL NFR BLD: 2.7 %
ERYTHROCYTE [DISTWIDTH] IN BLOOD BY AUTOMATED COUNT: 16.1 %
GRANULOCYTES # BLD: 5.86 TH/MM3
GRANULOCYTES NFR BLD: 68.1 %
HCT VFR BLD AUTO: 21.9 %
HGB BLD-MCNC: 7 G/DL
IMM GRANULOCYTES # BLD: 0.05 TH/MM3
IMM GRANULOCYTES NFR BLD: 0.6 %
LYMPHOCYTES # BLD: 1.61 TH/MM3
LYMPHOCYTES NFR BLD: 18.7 %
MCH RBC QN AUTO: 27.2 PG
MCHC RBC AUTO-ENTMCNC: 32 G/DL
MCV RBC AUTO: 85.2 FL
MONOCYTES # BLD: 0.81 TH/MM3
MONOCYTES NFR BLD: 9.4 %
PLATELET # BLD: 176 TH/MM3
PMV BLD AUTO: 10.7 FL
RBC # BLD AUTO: 2.57 MIL/MM3
WBC # BLD: 8.6 TH/MM3

## 2017-06-05 ENCOUNTER — APPOINTMENT (OUTPATIENT)
Dept: HEMATOLOGY ONCOLOGY | Facility: CLINIC | Age: 67
End: 2017-06-05

## 2017-06-05 ENCOUNTER — RESULT REVIEW (OUTPATIENT)
Age: 67
End: 2017-06-05

## 2017-06-05 VITALS
BODY MASS INDEX: 26.47 KG/M2 | DIASTOLIC BLOOD PRESSURE: 76 MMHG | TEMPERATURE: 97.3 F | WEIGHT: 164 LBS | SYSTOLIC BLOOD PRESSURE: 148 MMHG | HEART RATE: 89 BPM

## 2017-06-06 LAB
ALBUMIN SERPL-MCNC: 3.6 G/DL
ALBUMIN/GLOB SERPL: 1.5
ALP SERPL-CCNC: 51 IU/L
ALT SERPL-CCNC: 8 IU/L
ANION GAP SERPL CALC-SCNC: 11 MEQ/L
AST SERPL-CCNC: 13 IU/L
BASOPHILS # BLD: 0.02 TH/MM3
BASOPHILS NFR BLD: 0.3 %
BILIRUB SERPL-MCNC: 0.5 MG/DL
BUN SERPL-MCNC: 63 MG/DL
BUN/CREAT SERPL: 9.3 %
CALCIUM SERPL-MCNC: 9.4 MG/DL
CHLORIDE SERPL-SCNC: 106 MEQ/L
CO2 SERPL-SCNC: 22 MEQ/L
CREAT SERPL-MCNC: 6.76 MG/DL
EOSINOPHIL # BLD: 0.1 TH/MM3
EOSINOPHIL NFR BLD: 1.5 %
ERYTHROCYTE [DISTWIDTH] IN BLOOD BY AUTOMATED COUNT: 16 %
GFR SERPL CREATININE-BSD FRML MDRD: 8
GLUCOSE SERPL-MCNC: 92 MG/DL
GRANULOCYTES # BLD: 4.81 TH/MM3
GRANULOCYTES NFR BLD: 73.7 %
HCT VFR BLD AUTO: 24.1 %
HGB BLD-MCNC: 7.6 G/DL
IMM GRANULOCYTES # BLD: 0.01 TH/MM3
IMM GRANULOCYTES NFR BLD: 0.2 %
LYMPHOCYTES # BLD: 0.97 TH/MM3
LYMPHOCYTES NFR BLD: 14.9 %
MCH RBC QN AUTO: 26.8 PG
MCHC RBC AUTO-ENTMCNC: 31.5 G/DL
MCV RBC AUTO: 84.9 FL
MONOCYTES # BLD: 0.61 TH/MM3
MONOCYTES NFR BLD: 9.4 %
PLATELET # BLD: 162 TH/MM3
PMV BLD AUTO: 9.7 FL
POTASSIUM SERPL-SCNC: 5.2 MMOL/L
PROT SERPL-MCNC: 6 G/DL
RBC # BLD AUTO: 2.84 MIL/MM3
SODIUM SERPL-SCNC: 139 MEQ/L
WBC # BLD: 6.52 TH/MM3

## 2017-06-08 ENCOUNTER — APPOINTMENT (OUTPATIENT)
Dept: INFUSION THERAPY | Facility: CLINIC | Age: 67
End: 2017-06-08

## 2017-06-08 LAB
ALBUMIN MFR SERPL ELPH: 59.3 %
ALBUMIN SERPL ELPH-MCNC: 4 G/DL
ALBUMIN SERPL-MCNC: 10.8
ALBUMIN/GLOB SERPL ELPH: 1.5 ZZ
ALBUMIN/GLOB UR ELPH: 0.12
ALPHA1 GLOB ?TM MFR UR ELPH: 14.4
ALPHA1 GLOB MFR SERPL ELPH: 6.8 %
ALPHA1 GLOB SERPL ELPH-MCNC: 0.5 G/DL
ALPHA2 GLOB ?TM MFR UR ELPH: 24.9
ALPHA2 GLOB MFR SERPL ELPH: 14.7 %
ALPHA2 GLOB SERPL ELPH-MCNC: 1 G/DL
B-GLOBULIN ?TM MFR UR ELPH: 36.3
B-GLOBULIN SERPL ELPH-MCNC: 0.7 G/DL
BASOPHILS # BLD: 0.04 TH/MM3
BASOPHILS NFR BLD: 0.5 %
BETA1 GLOB MFR SERPL ELPH: 10 %
CREAT UR-MCNC: 80 MG/DL
EOSINOPHIL # BLD: 0.21 TH/MM3
EOSINOPHIL NFR BLD: 2.4 %
ERYTHROCYTE [DISTWIDTH] IN BLOOD BY AUTOMATED COUNT: 16.5 %
GAMMA GLOB ?TM MFR UR ELPH: 13.5
GAMMA GLOB MFR SERPL ELPH: 9.2 %
GAMMA GLOB SERPL ELPH-MCNC: 0.6 G/DL
GRANULOCYTES # BLD: 5.98 TH/MM3
GRANULOCYTES NFR BLD: 69.5 %
HCT VFR BLD AUTO: 22.7 %
HGB BLD-MCNC: 7.3 G/DL
IMM GRANULOCYTES # BLD: 0.11 TH/MM3
IMM GRANULOCYTES NFR BLD: 1.3 %
KAPPA LC FREE SER-MCNC: 41.8 MG/L
KAPPA LC FREE/LAMBDA FREE SER: 0.17
LAMBDA LC FREE SERPL-MCNC: 248.9 MG/L
LYMPHOCYTES # BLD: 1.3 TH/MM3
LYMPHOCYTES NFR BLD: 15.1 %
MCH RBC QN AUTO: 27.5 PG
MCHC RBC AUTO-ENTMCNC: 32.2 G/DL
MCV RBC AUTO: 85.7 FL
MONOCYTES # BLD: 0.96 TH/MM3
MONOCYTES NFR BLD: 11.2 %
PLATELET # BLD: 168 TH/MM3
PMV BLD AUTO: 10.2 FL
PROT PATTERN SERPL ELPH-IMP: 6.7 G/DL
PROT PATTERN SERPL ELPH-IMP: NORMAL
PROT PATTERN UR ELPH-IMP: NORMAL
PROT SERPL-MCNC: 6.7 G/DL
PROT UR-MCNC: 185 MG/L
PROT/CREAT UR: 232
RBC # BLD AUTO: 2.65 MIL/MM3
WBC # BLD: 8.6 TH/MM3

## 2017-06-12 ENCOUNTER — RESULT REVIEW (OUTPATIENT)
Age: 67
End: 2017-06-12

## 2017-06-12 ENCOUNTER — APPOINTMENT (OUTPATIENT)
Dept: HEMATOLOGY ONCOLOGY | Facility: CLINIC | Age: 67
End: 2017-06-12

## 2017-06-12 ENCOUNTER — APPOINTMENT (OUTPATIENT)
Dept: INFUSION THERAPY | Facility: CLINIC | Age: 67
End: 2017-06-12

## 2017-06-12 VITALS
WEIGHT: 164 LBS | SYSTOLIC BLOOD PRESSURE: 141 MMHG | HEIGHT: 66 IN | HEART RATE: 90 BPM | DIASTOLIC BLOOD PRESSURE: 84 MMHG | BODY MASS INDEX: 26.36 KG/M2 | TEMPERATURE: 96.3 F

## 2017-06-13 LAB
ALBUMIN SERPL-MCNC: 3.8 G/DL
ALBUMIN/GLOB SERPL: 1.65
ALP SERPL-CCNC: 49 IU/L
ALT SERPL-CCNC: 9 IU/L
ANION GAP SERPL CALC-SCNC: 11 MEQ/L
AST SERPL-CCNC: 20 IU/L
BASOPHILS # BLD: 0.02 TH/MM3
BASOPHILS NFR BLD: 0.3 %
BILIRUB SERPL-MCNC: 0.7 MG/DL
BLD GP AB SCN SERPL QL: NEGATIVE
BUN SERPL-MCNC: 62 MG/DL
BUN/CREAT SERPL: 8.8 %
CALCIUM SERPL-MCNC: 9.3 MG/DL
CHLORIDE SERPL-SCNC: 107 MEQ/L
CO2 SERPL-SCNC: 19 MEQ/L
CREAT SERPL-MCNC: 7.06 MG/DL
EOSINOPHIL # BLD: 0.13 TH/MM3
EOSINOPHIL NFR BLD: 2 %
ERYTHROCYTE [DISTWIDTH] IN BLOOD BY AUTOMATED COUNT: 16.3 %
GFR SERPL CREATININE-BSD FRML MDRD: 8
GLUCOSE SERPL-MCNC: 93 MG/DL
GRANULOCYTES # BLD: 4.88 TH/MM3
GRANULOCYTES NFR BLD: 73.3 %
HCT VFR BLD AUTO: 23.9 %
HGB BLD-MCNC: 7.6 G/DL
IMM GRANULOCYTES # BLD: 0.01 TH/MM3
IMM GRANULOCYTES NFR BLD: 0.2 %
LYMPHOCYTES # BLD: 0.93 TH/MM3
LYMPHOCYTES NFR BLD: 14 %
MCH RBC QN AUTO: 27.1 PG
MCHC RBC AUTO-ENTMCNC: 31.8 G/DL
MCV RBC AUTO: 85.4 FL
MONOCYTES # BLD: 0.68 TH/MM3
MONOCYTES NFR BLD: 10.2 %
PLATELET # BLD: 179 TH/MM3
PMV BLD AUTO: 11.1 FL
POTASSIUM SERPL-SCNC: 5.2 MMOL/L
PROT SERPL-MCNC: 6.1 G/DL
RBC # BLD AUTO: 2.8 MIL/MM3
SODIUM SERPL-SCNC: 137 MEQ/L
WBC # BLD: 6.65 TH/MM3

## 2017-06-14 ENCOUNTER — APPOINTMENT (OUTPATIENT)
Dept: INFUSION THERAPY | Facility: CLINIC | Age: 67
End: 2017-06-14

## 2017-06-15 ENCOUNTER — APPOINTMENT (OUTPATIENT)
Dept: INFUSION THERAPY | Facility: CLINIC | Age: 67
End: 2017-06-15

## 2017-06-20 LAB
KAPPA LC FREE SER-MCNC: 39.6 MG/L
KAPPA LC FREE/LAMBDA FREE SER: 0.16
LAMBDA LC FREE SERPL-MCNC: 245.7 MG/L

## 2017-06-21 ENCOUNTER — APPOINTMENT (OUTPATIENT)
Dept: INFUSION THERAPY | Facility: CLINIC | Age: 67
End: 2017-06-21

## 2017-06-21 LAB
BASOPHILS # BLD: 0.03 TH/MM3
BASOPHILS NFR BLD: 0.3 %
EOSINOPHIL # BLD: 0.21 TH/MM3
EOSINOPHIL NFR BLD: 2.1 %
ERYTHROCYTE [DISTWIDTH] IN BLOOD BY AUTOMATED COUNT: 16.8 %
GRANULOCYTES # BLD: 8.16 TH/MM3
GRANULOCYTES NFR BLD: 79.7 %
HCT VFR BLD AUTO: 23.3 %
HGB BLD-MCNC: 7.6 G/DL
IMM GRANULOCYTES # BLD: 0.02 TH/MM3
IMM GRANULOCYTES NFR BLD: 0.2 %
LYMPHOCYTES # BLD: 1.08 TH/MM3
LYMPHOCYTES NFR BLD: 10.6 %
MCH RBC QN AUTO: 27.7 PG
MCHC RBC AUTO-ENTMCNC: 32.6 G/DL
MCV RBC AUTO: 85 FL
MONOCYTES # BLD: 0.73 TH/MM3
MONOCYTES NFR BLD: 7.1 %
PLATELET # BLD: 220 TH/MM3
PMV BLD AUTO: 10.1 FL
RBC # BLD AUTO: 2.74 MIL/MM3
WBC # BLD: 10.23 TH/MM3

## 2017-06-22 ENCOUNTER — APPOINTMENT (OUTPATIENT)
Dept: INFUSION THERAPY | Facility: CLINIC | Age: 67
End: 2017-06-22

## 2017-06-22 LAB
ALBUMIN SERPL-MCNC: 10
ALBUMIN/GLOB UR ELPH: 0.11
ALPHA1 GLOB ?TM MFR UR ELPH: 19.3
ALPHA2 GLOB ?TM MFR UR ELPH: 21.6
B-GLOBULIN ?TM MFR UR ELPH: 37
CREAT UR-MCNC: 101 MG/DL
GAMMA GLOB ?TM MFR UR ELPH: 12.2
PROT PATTERN UR ELPH-IMP: NORMAL
PROT UR-MCNC: 257 MG/L
PROT/CREAT UR: 254

## 2017-06-28 ENCOUNTER — APPOINTMENT (OUTPATIENT)
Dept: INFUSION THERAPY | Facility: CLINIC | Age: 67
End: 2017-06-28

## 2017-06-28 DIAGNOSIS — C90.00 MULTIPLE MYELOMA NOT HAVING ACHIEVED REMISSION: ICD-10-CM

## 2017-06-29 ENCOUNTER — APPOINTMENT (OUTPATIENT)
Dept: INFUSION THERAPY | Facility: CLINIC | Age: 67
End: 2017-06-29

## 2017-07-05 ENCOUNTER — APPOINTMENT (OUTPATIENT)
Dept: INFUSION THERAPY | Facility: CLINIC | Age: 67
End: 2017-07-05

## 2017-07-05 ENCOUNTER — EMERGENCY (EMERGENCY)
Facility: HOSPITAL | Age: 67
LOS: 0 days | Discharge: HOME | End: 2017-07-05
Admitting: INTERNAL MEDICINE

## 2017-07-05 DIAGNOSIS — Z94.84 STEM CELLS TRANSPLANT STATUS: ICD-10-CM

## 2017-07-05 DIAGNOSIS — Z92.89 PERSONAL HISTORY OF OTHER MEDICAL TREATMENT: ICD-10-CM

## 2017-07-05 DIAGNOSIS — D64.9 ANEMIA, UNSPECIFIED: ICD-10-CM

## 2017-07-05 DIAGNOSIS — R19.5 OTHER FECAL ABNORMALITIES: ICD-10-CM

## 2017-07-05 DIAGNOSIS — C90.00 MULTIPLE MYELOMA NOT HAVING ACHIEVED REMISSION: ICD-10-CM

## 2017-07-05 DIAGNOSIS — R55 SYNCOPE AND COLLAPSE: ICD-10-CM

## 2017-07-05 DIAGNOSIS — Z87.891 PERSONAL HISTORY OF NICOTINE DEPENDENCE: ICD-10-CM

## 2017-07-05 DIAGNOSIS — S37.009A UNSPECIFIED INJURY OF UNSPECIFIED KIDNEY, INITIAL ENCOUNTER: ICD-10-CM

## 2017-07-06 ENCOUNTER — APPOINTMENT (OUTPATIENT)
Dept: INFUSION THERAPY | Facility: CLINIC | Age: 67
End: 2017-07-06

## 2017-07-10 LAB
BASOPHILS # BLD: 0.01 TH/MM3
BASOPHILS # BLD: 0.03 TH/MM3
BASOPHILS NFR BLD: 0.1 %
BASOPHILS NFR BLD: 0.2 %
EOSINOPHIL # BLD: 0.08 TH/MM3
EOSINOPHIL # BLD: 0.5 TH/MM3
EOSINOPHIL NFR BLD: 0.6 %
EOSINOPHIL NFR BLD: 4.6 %
ERYTHROCYTE [DISTWIDTH] IN BLOOD BY AUTOMATED COUNT: 16.6 %
ERYTHROCYTE [DISTWIDTH] IN BLOOD BY AUTOMATED COUNT: 17.2 %
GRANULOCYTES # BLD: 11.24 TH/MM3
GRANULOCYTES # BLD: 8.88 TH/MM3
GRANULOCYTES NFR BLD: 82 %
GRANULOCYTES NFR BLD: 91 %
HCT VFR BLD AUTO: 18.2 %
HCT VFR BLD AUTO: 21.4 %
HGB BLD-MCNC: 5.9 G/DL
HGB BLD-MCNC: 6.8 G/DL
IMM GRANULOCYTES # BLD: 0.05 TH/MM3
IMM GRANULOCYTES # BLD: 0.13 TH/MM3
IMM GRANULOCYTES NFR BLD: 0.5 %
IMM GRANULOCYTES NFR BLD: 1.1 %
LYMPHOCYTES # BLD: 0.27 TH/MM3
LYMPHOCYTES # BLD: 0.47 TH/MM3
LYMPHOCYTES NFR BLD: 2.2 %
LYMPHOCYTES NFR BLD: 4.3 %
MCH RBC QN AUTO: 25.7 PG
MCH RBC QN AUTO: 26 PG
MCHC RBC AUTO-ENTMCNC: 31.8 G/DL
MCHC RBC AUTO-ENTMCNC: 32.4 G/DL
MCV RBC AUTO: 79.1 FL
MCV RBC AUTO: 81.7 FL
MONOCYTES # BLD: 0.61 TH/MM3
MONOCYTES # BLD: 0.92 TH/MM3
MONOCYTES NFR BLD: 4.9 %
MONOCYTES NFR BLD: 8.5 %
PLATELET # BLD: 123 TH/MM3
PLATELET # BLD: 149 TH/MM3
PMV BLD AUTO: 11.2 FL
PMV BLD AUTO: 12.4 FL
RBC # BLD AUTO: 2.3 MIL/MM3
RBC # BLD AUTO: 2.62 MIL/MM3
WBC # BLD: 10.83 TH/MM3
WBC # BLD: 12.36 TH/MM3

## 2017-07-12 ENCOUNTER — APPOINTMENT (OUTPATIENT)
Dept: INFUSION THERAPY | Facility: CLINIC | Age: 67
End: 2017-07-12

## 2017-07-12 ENCOUNTER — APPOINTMENT (OUTPATIENT)
Dept: HEMATOLOGY ONCOLOGY | Facility: CLINIC | Age: 67
End: 2017-07-12

## 2017-07-12 ENCOUNTER — OUTPATIENT (OUTPATIENT)
Dept: OUTPATIENT SERVICES | Facility: HOSPITAL | Age: 67
LOS: 1 days | Discharge: HOME | End: 2017-07-12

## 2017-07-12 VITALS
BODY MASS INDEX: 25.55 KG/M2 | WEIGHT: 159 LBS | SYSTOLIC BLOOD PRESSURE: 136 MMHG | DIASTOLIC BLOOD PRESSURE: 65 MMHG | HEART RATE: 88 BPM | HEIGHT: 66 IN | TEMPERATURE: 98.8 F

## 2017-07-12 DIAGNOSIS — D64.9 ANEMIA, UNSPECIFIED: ICD-10-CM

## 2017-07-12 DIAGNOSIS — D63.1 ANEMIA IN CHRONIC KIDNEY DISEASE: ICD-10-CM

## 2017-07-12 DIAGNOSIS — S37.009A UNSPECIFIED INJURY OF UNSPECIFIED KIDNEY, INITIAL ENCOUNTER: ICD-10-CM

## 2017-07-12 DIAGNOSIS — R55 SYNCOPE AND COLLAPSE: ICD-10-CM

## 2017-07-12 DIAGNOSIS — N19 UNSPECIFIED KIDNEY FAILURE: ICD-10-CM

## 2017-07-12 DIAGNOSIS — C90.01 MULTIPLE MYELOMA IN REMISSION: ICD-10-CM

## 2017-07-12 DIAGNOSIS — C90.02 MULTIPLE MYELOMA IN RELAPSE: ICD-10-CM

## 2017-07-12 DIAGNOSIS — C90.00 MULTIPLE MYELOMA NOT HAVING ACHIEVED REMISSION: ICD-10-CM

## 2017-07-13 ENCOUNTER — APPOINTMENT (OUTPATIENT)
Dept: INFUSION THERAPY | Facility: CLINIC | Age: 67
End: 2017-07-13

## 2017-07-13 LAB
ALBUMIN SERPL-MCNC: 2.8 G/DL
ALBUMIN/GLOB SERPL: 1.12
ALP SERPL-CCNC: 43 IU/L
ALT SERPL-CCNC: 12 IU/L
ANION GAP SERPL CALC-SCNC: 11 MEQ/L
AST SERPL-CCNC: 12 IU/L
BASOPHILS # BLD: 0.02 TH/MM3
BASOPHILS NFR BLD: 0.7 %
BILIRUB SERPL-MCNC: 0.5 MG/DL
BUN SERPL-MCNC: 71 MG/DL
BUN/CREAT SERPL: 9.9 %
CALCIUM SERPL-MCNC: 8.6 MG/DL
CHLORIDE SERPL-SCNC: 104 MEQ/L
CO2 SERPL-SCNC: 19 MEQ/L
CREAT SERPL-MCNC: 7.2 MG/DL
EOSINOPHIL # BLD: 0.18 TH/MM3
EOSINOPHIL NFR BLD: 6.6 %
ERYTHROCYTE [DISTWIDTH] IN BLOOD BY AUTOMATED COUNT: 17.6 %
GFR SERPL CREATININE-BSD FRML MDRD: 8
GLUCOSE SERPL-MCNC: 102 MG/DL
GRANULOCYTES # BLD: 1.6 TH/MM3
GRANULOCYTES NFR BLD: 59 %
HCT VFR BLD AUTO: 24.9 %
HGB BLD-MCNC: 8 G/DL
IMM GRANULOCYTES # BLD: 0.05 TH/MM3
IMM GRANULOCYTES NFR BLD: 1.8 %
KAPPA LC FREE SER-MCNC: 48.8 MG/L
KAPPA LC FREE/LAMBDA FREE SER: 0.68
LAMBDA LC FREE SERPL-MCNC: 72.2 MG/L
LYMPHOCYTES # BLD: 0.42 TH/MM3
LYMPHOCYTES NFR BLD: 15.4 %
MCH RBC QN AUTO: 26.2 PG
MCHC RBC AUTO-ENTMCNC: 32.1 G/DL
MCV RBC AUTO: 81.6 FL
MONOCYTES # BLD: 0.45 TH/MM3
MONOCYTES NFR BLD: 16.5 %
PLATELET # BLD: 100 TH/MM3
PMV BLD AUTO: 12.6 FL
POTASSIUM SERPL-SCNC: 4.6 MMOL/L
PROT SERPL-MCNC: 5.3 G/DL
RBC # BLD AUTO: 3.05 MIL/MM3
SODIUM SERPL-SCNC: 134 MEQ/L
WBC # BLD: 2.72 TH/MM3

## 2017-07-19 ENCOUNTER — APPOINTMENT (OUTPATIENT)
Dept: INFUSION THERAPY | Facility: CLINIC | Age: 67
End: 2017-07-19

## 2017-07-20 ENCOUNTER — APPOINTMENT (OUTPATIENT)
Dept: INFUSION THERAPY | Facility: CLINIC | Age: 67
End: 2017-07-20

## 2017-07-20 LAB
BASOPHILS # BLD: 0.07 TH/MM3
BASOPHILS NFR BLD: 1.8 %
EOSINOPHIL # BLD: 0.16 TH/MM3
EOSINOPHIL NFR BLD: 4.1 %
ERYTHROCYTE [DISTWIDTH] IN BLOOD BY AUTOMATED COUNT: 18.5 %
GRANULOCYTES # BLD: 2.15 TH/MM3
GRANULOCYTES NFR BLD: 54.7 %
HCT VFR BLD AUTO: 23.9 %
HGB BLD-MCNC: 7.7 G/DL
IMM GRANULOCYTES # BLD: 0.08 TH/MM3
IMM GRANULOCYTES NFR BLD: 2 %
LYMPHOCYTES # BLD: 0.55 TH/MM3
LYMPHOCYTES NFR BLD: 14 %
MCH RBC QN AUTO: 26.5 PG
MCHC RBC AUTO-ENTMCNC: 32.2 G/DL
MCV RBC AUTO: 82.1 FL
MONOCYTES # BLD: 0.92 TH/MM3
MONOCYTES NFR BLD: 23.4 %
PLATELET # BLD: 182 TH/MM3
PMV BLD AUTO: 10.6 FL
RBC # BLD AUTO: 2.91 MIL/MM3
WBC # BLD: 3.93 TH/MM3

## 2017-07-26 ENCOUNTER — APPOINTMENT (OUTPATIENT)
Dept: INFUSION THERAPY | Facility: CLINIC | Age: 67
End: 2017-07-26

## 2017-07-27 ENCOUNTER — APPOINTMENT (OUTPATIENT)
Dept: INFUSION THERAPY | Facility: CLINIC | Age: 67
End: 2017-07-27

## 2017-07-27 LAB
BASOPHILS # BLD: 0.09 TH/MM3
BASOPHILS NFR BLD: 1.5 %
EOSINOPHIL # BLD: 0.25 TH/MM3
EOSINOPHIL NFR BLD: 4.1 %
ERYTHROCYTE [DISTWIDTH] IN BLOOD BY AUTOMATED COUNT: 19.3 %
GRANULOCYTES # BLD: 4.07 TH/MM3
GRANULOCYTES NFR BLD: 66.7 %
HCT VFR BLD AUTO: 24.1 %
HGB BLD-MCNC: 7.7 G/DL
IMM GRANULOCYTES # BLD: 0.12 TH/MM3
IMM GRANULOCYTES NFR BLD: 2 %
LYMPHOCYTES # BLD: 0.88 TH/MM3
LYMPHOCYTES NFR BLD: 14.4 %
MCH RBC QN AUTO: 26.2 PG
MCHC RBC AUTO-ENTMCNC: 32 G/DL
MCV RBC AUTO: 82 FL
MONOCYTES # BLD: 0.69 TH/MM3
MONOCYTES NFR BLD: 11.3 %
PLATELET # BLD: 171 TH/MM3
RBC # BLD AUTO: 2.94 MIL/MM3
WBC # BLD: 6.1 TH/MM3

## 2017-08-02 ENCOUNTER — APPOINTMENT (OUTPATIENT)
Dept: INFUSION THERAPY | Facility: CLINIC | Age: 67
End: 2017-08-02

## 2017-08-02 LAB
ALBUMIN SERPL-MCNC: 2.1
ALBUMIN/GLOB UR ELPH: 0.02
ALPHA1 GLOB ?TM MFR UR ELPH: 2.4
ALPHA2 GLOB ?TM MFR UR ELPH: 5.2
B-GLOBULIN ?TM MFR UR ELPH: 25.7
BASOPHILS # BLD: 0.14 TH/MM3
BASOPHILS NFR BLD: 2.5 %
CREAT UR-MCNC: 86 MG/DL
EOSINOPHIL # BLD: 0.39 TH/MM3
EOSINOPHIL NFR BLD: 7 %
ERYTHROCYTE [DISTWIDTH] IN BLOOD BY AUTOMATED COUNT: 19.8 %
GAMMA GLOB ?TM MFR UR ELPH: 64.7
GRANULOCYTES # BLD: 2.93 TH/MM3
GRANULOCYTES NFR BLD: 52.5 %
HCT VFR BLD AUTO: 22.9 %
HGB BLD-MCNC: 7.4 G/DL
IMM GRANULOCYTES # BLD: 0.24 TH/MM3
IMM GRANULOCYTES NFR BLD: 4.3 %
LYMPHOCYTES # BLD: 1.14 TH/MM3
LYMPHOCYTES NFR BLD: 20.4 %
MCH RBC QN AUTO: 26.1 PG
MCHC RBC AUTO-ENTMCNC: 32.3 G/DL
MCV RBC AUTO: 80.6 FL
MONOCYTES # BLD: 0.74 TH/MM3
MONOCYTES NFR BLD: 13.3 %
PLATELET # BLD: 122 TH/MM3
PROT PATTERN UR ELPH-IMP: NORMAL
PROT UR-MCNC: 1641 MG/L
PROT/CREAT UR: 1915
RBC # BLD AUTO: 2.84 MIL/MM3
WBC # BLD: 5.58 TH/MM3

## 2017-08-09 ENCOUNTER — APPOINTMENT (OUTPATIENT)
Dept: HEMATOLOGY ONCOLOGY | Facility: CLINIC | Age: 67
End: 2017-08-09

## 2017-08-09 ENCOUNTER — APPOINTMENT (OUTPATIENT)
Dept: INFUSION THERAPY | Facility: CLINIC | Age: 67
End: 2017-08-09

## 2017-08-09 VITALS
SYSTOLIC BLOOD PRESSURE: 131 MMHG | HEIGHT: 66 IN | DIASTOLIC BLOOD PRESSURE: 68 MMHG | TEMPERATURE: 96.7 F | HEART RATE: 95 BPM | WEIGHT: 153 LBS | BODY MASS INDEX: 24.59 KG/M2

## 2017-08-10 LAB
ALBUMIN SERPL-MCNC: 3 G/DL
ALBUMIN/GLOB SERPL: 1.25
ALP SERPL-CCNC: 49 IU/L
ALT SERPL-CCNC: 12 IU/L
ANION GAP SERPL CALC-SCNC: 11 MEQ/L
AST SERPL-CCNC: 11 IU/L
BASOPHILS # BLD: 0.03 TH/MM3
BASOPHILS NFR BLD: 0.9 %
BILIRUB SERPL-MCNC: 0.4 MG/DL
BUN SERPL-MCNC: 62 MG/DL
BUN/CREAT SERPL: 8.9 %
CALCIUM SERPL-MCNC: 9.2 MG/DL
CHLORIDE SERPL-SCNC: 109 MEQ/L
CO2 SERPL-SCNC: 19 MEQ/L
CREAT SERPL-MCNC: 6.97 MG/DL
EOSINOPHIL # BLD: 0.3 TH/MM3
EOSINOPHIL NFR BLD: 9.4 %
ERYTHROCYTE [DISTWIDTH] IN BLOOD BY AUTOMATED COUNT: 20.9 %
GFR SERPL CREATININE-BSD FRML MDRD: 8
GLUCOSE SERPL-MCNC: 100 MG/DL
GRANULOCYTES # BLD: 1.72 TH/MM3
GRANULOCYTES NFR BLD: 54.2 %
HCT VFR BLD AUTO: 21.7 %
HGB BLD-MCNC: 6.7 G/DL
IMM GRANULOCYTES # BLD: 0 TH/MM3
IMM GRANULOCYTES NFR BLD: 0 %
LYMPHOCYTES # BLD: 0.58 TH/MM3
LYMPHOCYTES NFR BLD: 18.2 %
MCH RBC QN AUTO: 25.4 PG
MCHC RBC AUTO-ENTMCNC: 30.9 G/DL
MCV RBC AUTO: 82.2 FL
MONOCYTES # BLD: 0.55 TH/MM3
MONOCYTES NFR BLD: 17.3 %
PLATELET # BLD: 120 TH/MM3
PMV BLD AUTO: 11.3 FL
POTASSIUM SERPL-SCNC: 4 MMOL/L
PROT SERPL-MCNC: 5.4 G/DL
RBC # BLD AUTO: 2.64 MIL/MM3
SODIUM SERPL-SCNC: 139 MEQ/L
WBC # BLD: 3.18 TH/MM3

## 2017-08-13 LAB
KAPPA LC FREE SER-MCNC: 38.1 MG/L
KAPPA LC FREE/LAMBDA FREE SER: 1.06
LAMBDA LC FREE SERPL-MCNC: 36 MG/L

## 2017-08-16 ENCOUNTER — APPOINTMENT (OUTPATIENT)
Dept: INFUSION THERAPY | Facility: CLINIC | Age: 67
End: 2017-08-16

## 2017-08-16 LAB
BASOPHILS # BLD: 0.1 TH/MM3
BASOPHILS NFR BLD: 2.3 %
EOSINOPHIL # BLD: 0.11 TH/MM3
EOSINOPHIL NFR BLD: 2.5 %
ERYTHROCYTE [DISTWIDTH] IN BLOOD BY AUTOMATED COUNT: 22.5 %
GRANULOCYTES # BLD: 2.84 TH/MM3
GRANULOCYTES NFR BLD: 65.7 %
HCT VFR BLD AUTO: 22.1 %
HGB BLD-MCNC: 6.8 G/DL
IMM GRANULOCYTES # BLD: 0.11 TH/MM3
IMM GRANULOCYTES NFR BLD: 2.5 %
LYMPHOCYTES # BLD: 0.67 TH/MM3
LYMPHOCYTES NFR BLD: 15.5 %
MCH RBC QN AUTO: 25.8 PG
MCHC RBC AUTO-ENTMCNC: 30.8 G/DL
MCV RBC AUTO: 83.7 FL
MONOCYTES # BLD: 0.5 TH/MM3
MONOCYTES NFR BLD: 11.5 %
PLATELET # BLD: 126 TH/MM3
RBC # BLD AUTO: 2.64 MIL/MM3
WBC # BLD: 4.33 TH/MM3

## 2017-08-17 LAB
ALBUMIN SERPL-MCNC: 3.2 G/DL
ALBUMIN/GLOB SERPL: 1.39
ALP SERPL-CCNC: 51 IU/L
ALT SERPL-CCNC: 10 IU/L
ANION GAP SERPL CALC-SCNC: 11 MEQ/L
AST SERPL-CCNC: 13 IU/L
BILIRUB SERPL-MCNC: 0.7 MG/DL
BUN SERPL-MCNC: 63 MG/DL
BUN/CREAT SERPL: 11.1 %
CALCIUM SERPL-MCNC: 8.8 MG/DL
CHLORIDE SERPL-SCNC: 106 MEQ/L
CO2 SERPL-SCNC: 18 MEQ/L
CREAT SERPL-MCNC: 5.67 MG/DL
GFR SERPL CREATININE-BSD FRML MDRD: 10
GLUCOSE SERPL-MCNC: 90 MG/DL
POTASSIUM SERPL-SCNC: 4 MMOL/L
PROT SERPL-MCNC: 5.5 G/DL
SODIUM SERPL-SCNC: 135 MEQ/L

## 2017-08-23 ENCOUNTER — APPOINTMENT (OUTPATIENT)
Dept: INFUSION THERAPY | Facility: CLINIC | Age: 67
End: 2017-08-23

## 2017-08-24 LAB
BASOPHILS # BLD: 0.03 TH/MM3
BASOPHILS NFR BLD: 0.8 %
EOSINOPHIL # BLD: 0.25 TH/MM3
EOSINOPHIL NFR BLD: 6.3 %
ERYTHROCYTE [DISTWIDTH] IN BLOOD BY AUTOMATED COUNT: 23.9 %
GRANULOCYTES # BLD: 2.37 TH/MM3
GRANULOCYTES NFR BLD: 59.3 %
HCT VFR BLD AUTO: 20.5 %
HGB BLD-MCNC: 6.3 G/DL
IMM GRANULOCYTES # BLD: 0.04 TH/MM3
IMM GRANULOCYTES NFR BLD: 1 %
LYMPHOCYTES # BLD: 0.77 TH/MM3
LYMPHOCYTES NFR BLD: 19.3 %
MCH RBC QN AUTO: 25.9 PG
MCHC RBC AUTO-ENTMCNC: 30.7 G/DL
MCV RBC AUTO: 84.4 FL
MONOCYTES # BLD: 0.53 TH/MM3
MONOCYTES NFR BLD: 13.3 %
PLATELET # BLD: 99 TH/MM3
RBC # BLD AUTO: 2.43 MIL/MM3
WBC # BLD: 3.99 TH/MM3

## 2017-08-30 ENCOUNTER — APPOINTMENT (OUTPATIENT)
Dept: HEMATOLOGY ONCOLOGY | Facility: CLINIC | Age: 67
End: 2017-08-30

## 2017-08-30 ENCOUNTER — APPOINTMENT (OUTPATIENT)
Dept: INFUSION THERAPY | Facility: CLINIC | Age: 67
End: 2017-08-30

## 2017-08-30 VITALS
HEIGHT: 66 IN | DIASTOLIC BLOOD PRESSURE: 67 MMHG | RESPIRATION RATE: 18 BRPM | HEART RATE: 94 BPM | WEIGHT: 159 LBS | SYSTOLIC BLOOD PRESSURE: 142 MMHG | TEMPERATURE: 97 F | BODY MASS INDEX: 25.55 KG/M2

## 2017-08-31 LAB
ALBUMIN SERPL-MCNC: 3.2 G/DL
ALBUMIN/GLOB SERPL: 1.39
ALP SERPL-CCNC: 59 IU/L
ALT SERPL-CCNC: 11 IU/L
ANION GAP SERPL CALC-SCNC: 11 MEQ/L
AST SERPL-CCNC: 14 IU/L
BASOPHILS # BLD: 0.06 TH/MM3
BASOPHILS NFR BLD: 1.7 %
BILIRUB SERPL-MCNC: 0.6 MG/DL
BUN SERPL-MCNC: 55 MG/DL
BUN/CREAT SERPL: 8.6 %
CALCIUM SERPL-MCNC: 9.1 MG/DL
CHLORIDE SERPL-SCNC: 106 MEQ/L
CO2 SERPL-SCNC: 20 MEQ/L
CREAT SERPL-MCNC: 6.38 MG/DL
EOSINOPHIL # BLD: 0.39 TH/MM3
EOSINOPHIL NFR BLD: 11.2 %
ERYTHROCYTE [DISTWIDTH] IN BLOOD BY AUTOMATED COUNT: 23.7 %
FERRITIN SERPL-MCNC: 183 NG/ML
GFR SERPL CREATININE-BSD FRML MDRD: 9
GLUCOSE SERPL-MCNC: 99 MG/DL
GRANULOCYTES # BLD: 1.78 TH/MM3
GRANULOCYTES NFR BLD: 51.2 %
HCT VFR BLD AUTO: 20.6 %
HGB BLD-MCNC: 6.3 G/DL
IMM GRANULOCYTES # BLD: 0.1 TH/MM3
IMM GRANULOCYTES NFR BLD: 2.9 %
KAPPA LC FREE SER-MCNC: 46.1 MG/L
KAPPA LC FREE/LAMBDA FREE SER: 1.52
LAMBDA LC FREE SERPL-MCNC: 30.3 MG/L
LYMPHOCYTES # BLD: 0.79 TH/MM3
LYMPHOCYTES NFR BLD: 22.7 %
MCH RBC QN AUTO: 26 PG
MCHC RBC AUTO-ENTMCNC: 30.6 G/DL
MCV RBC AUTO: 85.1 FL
MONOCYTES # BLD: 0.36 TH/MM3
MONOCYTES NFR BLD: 10.3 %
PERCENT SATURATION (NORTH): 15.4 %
PLATELET # BLD: 77 TH/MM3
POTASSIUM SERPL-SCNC: 4.5 MMOL/L
PROT SERPL-MCNC: 5.5 G/DL
RBC # BLD AUTO: 2.42 MIL/MM3
SODIUM SERPL-SCNC: 137 MEQ/L
TIBC SERPL-MCNC: 266 UG/DL
WBC # BLD: 3.48 TH/MM3

## 2017-09-06 ENCOUNTER — RESULT REVIEW (OUTPATIENT)
Age: 67
End: 2017-09-06

## 2017-09-06 ENCOUNTER — APPOINTMENT (OUTPATIENT)
Dept: INFUSION THERAPY | Facility: CLINIC | Age: 67
End: 2017-09-06

## 2017-09-06 LAB
BASOPHILS # BLD: 0.04 TH/MM3
BASOPHILS NFR BLD: 1.5 %
EOSINOPHIL # BLD: 0.36 TH/MM3
EOSINOPHIL NFR BLD: 13.5 %
ERYTHROCYTE [DISTWIDTH] IN BLOOD BY AUTOMATED COUNT: 23.7 %
GRANULOCYTES # BLD: 0.9 TH/MM3
GRANULOCYTES NFR BLD: 33.8 %
HCT VFR BLD AUTO: 19.5 %
HGB BLD-MCNC: 5.9 G/DL
IMM GRANULOCYTES # BLD: 0.05 TH/MM3
IMM GRANULOCYTES NFR BLD: 1.9 %
LYMPHOCYTES # BLD: 0.75 TH/MM3
LYMPHOCYTES NFR BLD: 28.2 %
MCH RBC QN AUTO: 25.7 PG
MCHC RBC AUTO-ENTMCNC: 30.3 G/DL
MCV RBC AUTO: 84.8 FL
MONOCYTES # BLD: 0.56 TH/MM3
MONOCYTES NFR BLD: 21.1 %
PLATELET # BLD: 70 TH/MM3
RBC # BLD AUTO: 2.3 MIL/MM3
WBC # BLD: 2.66 TH/MM3

## 2017-09-13 ENCOUNTER — APPOINTMENT (OUTPATIENT)
Dept: INFUSION THERAPY | Facility: CLINIC | Age: 67
End: 2017-09-13

## 2017-09-13 VITALS
DIASTOLIC BLOOD PRESSURE: 73 MMHG | HEART RATE: 85 BPM | RESPIRATION RATE: 18 BRPM | SYSTOLIC BLOOD PRESSURE: 121 MMHG | TEMPERATURE: 97.9 F

## 2017-09-13 LAB
BASOPHILS # BLD: 0.09 TH/MM3
BASOPHILS NFR BLD: 2.1 %
EOSINOPHIL # BLD: 0.09 TH/MM3
EOSINOPHIL NFR BLD: 2.1 %
ERYTHROCYTE [DISTWIDTH] IN BLOOD BY AUTOMATED COUNT: 21.3 %
GRANULOCYTES # BLD: 2.17 TH/MM3
GRANULOCYTES NFR BLD: 50.9 %
HCT VFR BLD AUTO: 24.9 %
HGB BLD-MCNC: 8 G/DL
IMM GRANULOCYTES # BLD: 0.04 TH/MM3
IMM GRANULOCYTES NFR BLD: 0.9 %
LYMPHOCYTES # BLD: 1.14 TH/MM3
LYMPHOCYTES NFR BLD: 26.7 %
MCH RBC QN AUTO: 27.4 PG
MCHC RBC AUTO-ENTMCNC: 32.1 G/DL
MCV RBC AUTO: 85.3 FL
MONOCYTES # BLD: 0.74 TH/MM3
MONOCYTES NFR BLD: 17.3 %
PLATELET # BLD: 143 TH/MM3
PMV BLD AUTO: 10.7 FL
RBC # BLD AUTO: 2.92 MIL/MM3
WBC # BLD: 4.27 TH/MM3

## 2017-09-14 LAB
ABO + RH BLD: NORMAL
BLD GP AB SCN SERPL QL: POSITIVE

## 2017-09-20 ENCOUNTER — APPOINTMENT (OUTPATIENT)
Dept: INFUSION THERAPY | Facility: CLINIC | Age: 67
End: 2017-09-20

## 2017-09-21 LAB
BASOPHILS # BLD: 0.04 TH/MM3
BASOPHILS NFR BLD: 0.8 %
EOSINOPHIL # BLD: 0.33 TH/MM3
EOSINOPHIL NFR BLD: 7 %
ERYTHROCYTE [DISTWIDTH] IN BLOOD BY AUTOMATED COUNT: 23 %
GRANULOCYTES # BLD: 2.91 TH/MM3
GRANULOCYTES NFR BLD: 61.9 %
HCT VFR BLD AUTO: 27.1 %
HGB BLD-MCNC: 8.4 G/DL
IMM GRANULOCYTES # BLD: 0.04 TH/MM3
IMM GRANULOCYTES NFR BLD: 0.8 %
LYMPHOCYTES # BLD: 0.92 TH/MM3
LYMPHOCYTES NFR BLD: 19.5 %
MCH RBC QN AUTO: 27.3 PG
MCHC RBC AUTO-ENTMCNC: 31 G/DL
MCV RBC AUTO: 88 FL
MONOCYTES # BLD: 0.47 TH/MM3
MONOCYTES NFR BLD: 10 %
PLATELET # BLD: 173 TH/MM3
RBC # BLD AUTO: 3.08 MIL/MM3
WBC # BLD: 4.71 TH/MM3

## 2017-09-27 ENCOUNTER — APPOINTMENT (OUTPATIENT)
Dept: HEMATOLOGY ONCOLOGY | Facility: CLINIC | Age: 67
End: 2017-09-27

## 2017-09-27 ENCOUNTER — APPOINTMENT (OUTPATIENT)
Dept: INFUSION THERAPY | Facility: CLINIC | Age: 67
End: 2017-09-27

## 2017-09-28 LAB
ALBUMIN SERPL-MCNC: 3.4 G/DL
ALBUMIN/GLOB SERPL: 1.79
ALP SERPL-CCNC: 59 IU/L
ALT SERPL-CCNC: 10 IU/L
ANION GAP SERPL CALC-SCNC: 10 MEQ/L
AST SERPL-CCNC: 22 IU/L
BASOPHILS # BLD: 0.06 TH/MM3
BASOPHILS NFR BLD: 1.3 %
BILIRUB SERPL-MCNC: 1 MG/DL
BUN SERPL-MCNC: 58 MG/DL
BUN/CREAT SERPL: 8.8 %
CALCIUM SERPL-MCNC: 9.7 MG/DL
CHLORIDE SERPL-SCNC: 107 MEQ/L
CO2 SERPL-SCNC: 23 MEQ/L
CREAT SERPL-MCNC: 6.58 MG/DL
EOSINOPHIL # BLD: 0.37 TH/MM3
EOSINOPHIL NFR BLD: 8.1 %
ERYTHROCYTE [DISTWIDTH] IN BLOOD BY AUTOMATED COUNT: 22.4 %
GFR SERPL CREATININE-BSD FRML MDRD: 9
GLUCOSE SERPL-MCNC: 110 MG/DL
GRANULOCYTES # BLD: 2.87 TH/MM3
GRANULOCYTES NFR BLD: 63.2 %
HCT VFR BLD AUTO: 25.7 %
HGB BLD-MCNC: 8 G/DL
IMM GRANULOCYTES # BLD: 0.04 TH/MM3
IMM GRANULOCYTES NFR BLD: 0.9 %
LYMPHOCYTES # BLD: 0.73 TH/MM3
LYMPHOCYTES NFR BLD: 16 %
MCH RBC QN AUTO: 27.2 PG
MCHC RBC AUTO-ENTMCNC: 31.1 G/DL
MCV RBC AUTO: 87.4 FL
MONOCYTES # BLD: 0.48 TH/MM3
MONOCYTES NFR BLD: 10.5 %
PLATELET # BLD: 76 TH/MM3
POTASSIUM SERPL-SCNC: 4.9 MMOL/L
PROT SERPL-MCNC: 5.3 G/DL
RBC # BLD AUTO: 2.94 MIL/MM3
SODIUM SERPL-SCNC: 140 MEQ/L
WBC # BLD: 4.55 TH/MM3

## 2017-10-03 LAB
KAPPA LC FREE SER-MCNC: 46.8 MG/L
KAPPA LC FREE/LAMBDA FREE SER: 1.4
LAMBDA LC FREE SERPL-MCNC: 33.5 MG/L

## 2017-10-04 ENCOUNTER — APPOINTMENT (OUTPATIENT)
Dept: INFUSION THERAPY | Facility: CLINIC | Age: 67
End: 2017-10-04

## 2017-10-11 ENCOUNTER — OUTPATIENT (OUTPATIENT)
Dept: OUTPATIENT SERVICES | Facility: HOSPITAL | Age: 67
LOS: 1 days | Discharge: HOME | End: 2017-10-11

## 2017-10-11 ENCOUNTER — RESULT REVIEW (OUTPATIENT)
Age: 67
End: 2017-10-11

## 2017-10-11 ENCOUNTER — APPOINTMENT (OUTPATIENT)
Dept: INFUSION THERAPY | Facility: CLINIC | Age: 67
End: 2017-10-11

## 2017-10-11 VITALS
DIASTOLIC BLOOD PRESSURE: 65 MMHG | TEMPERATURE: 96.5 F | HEART RATE: 80 BPM | SYSTOLIC BLOOD PRESSURE: 140 MMHG | RESPIRATION RATE: 18 BRPM

## 2017-10-11 DIAGNOSIS — D64.9 ANEMIA, UNSPECIFIED: ICD-10-CM

## 2017-10-11 DIAGNOSIS — R55 SYNCOPE AND COLLAPSE: ICD-10-CM

## 2017-10-11 DIAGNOSIS — C90.02 MULTIPLE MYELOMA IN RELAPSE: ICD-10-CM

## 2017-10-11 DIAGNOSIS — S37.009A UNSPECIFIED INJURY OF UNSPECIFIED KIDNEY, INITIAL ENCOUNTER: ICD-10-CM

## 2017-10-11 DIAGNOSIS — C90.00 MULTIPLE MYELOMA NOT HAVING ACHIEVED REMISSION: ICD-10-CM

## 2017-10-12 LAB
ALBUMIN SERPL-MCNC: 3.4 G/DL
ALBUMIN/GLOB SERPL: 1.62
ALP SERPL-CCNC: 49 IU/L
ALT SERPL-CCNC: 9 IU/L
ANION GAP SERPL CALC-SCNC: 14 MEQ/L
AST SERPL-CCNC: 14 IU/L
BASOPHILS # BLD: 0.1 TH/MM3
BASOPHILS NFR BLD: 2.1 %
BILIRUB SERPL-MCNC: 0.6 MG/DL
BUN SERPL-MCNC: 64 MG/DL
BUN/CREAT SERPL: 9 %
CALCIUM SERPL-MCNC: 9.4 MG/DL
CHLORIDE SERPL-SCNC: 102 MEQ/L
CO2 SERPL-SCNC: 20 MEQ/L
CREAT SERPL-MCNC: 7.08 MG/DL
EOSINOPHIL # BLD: 0.16 TH/MM3
EOSINOPHIL NFR BLD: 3.4 %
ERYTHROCYTE [DISTWIDTH] IN BLOOD BY AUTOMATED COUNT: 21.6 %
FERRITIN SERPL-MCNC: 390 NG/ML
GFR SERPL CREATININE-BSD FRML MDRD: 8
GLUCOSE SERPL-MCNC: 86 MG/DL
GRANULOCYTES # BLD: 3.15 TH/MM3
GRANULOCYTES NFR BLD: 66.1 %
HCT VFR BLD AUTO: 24.1 %
HGB BLD-MCNC: 7.5 G/DL
IMM GRANULOCYTES # BLD: 0.03 TH/MM3
IMM GRANULOCYTES NFR BLD: 0.6 %
IRON SERPL-MCNC: 62 UG/DL
KAPPA LC FREE SER-MCNC: 49 MG/L
KAPPA LC FREE/LAMBDA FREE SER: 1.65
LAMBDA LC FREE SERPL-MCNC: 29.7 MG/L
LYMPHOCYTES # BLD: 0.96 TH/MM3
LYMPHOCYTES NFR BLD: 20.2 %
MCH RBC QN AUTO: 26.9 PG
MCHC RBC AUTO-ENTMCNC: 31.1 G/DL
MCV RBC AUTO: 86.4 FL
MONOCYTES # BLD: 0.36 TH/MM3
MONOCYTES NFR BLD: 7.6 %
PLATELET # BLD: 81 TH/MM3
POTASSIUM SERPL-SCNC: 4.2 MMOL/L
PROT SERPL-MCNC: 5.5 G/DL
RBC # BLD AUTO: 2.79 MIL/MM3
SODIUM SERPL-SCNC: 136 MEQ/L
TIBC SERPL-MCNC: 227 UG/DL
WBC # BLD: 4.76 TH/MM3

## 2017-10-23 ENCOUNTER — APPOINTMENT (OUTPATIENT)
Dept: HEMATOLOGY ONCOLOGY | Facility: CLINIC | Age: 67
End: 2017-10-23

## 2017-10-25 ENCOUNTER — APPOINTMENT (OUTPATIENT)
Dept: INFUSION THERAPY | Facility: CLINIC | Age: 67
End: 2017-10-25

## 2017-10-25 VITALS
DIASTOLIC BLOOD PRESSURE: 55 MMHG | RESPIRATION RATE: 18 BRPM | SYSTOLIC BLOOD PRESSURE: 155 MMHG | TEMPERATURE: 97.1 F | HEART RATE: 80 BPM

## 2017-10-26 LAB
ALBUMIN SERPL-MCNC: 3.4 G/DL
ALBUMIN/GLOB SERPL: 1.79
ALP SERPL-CCNC: 44 IU/L
ALT SERPL-CCNC: 11 IU/L
ANION GAP SERPL CALC-SCNC: 11 MEQ/L
AST SERPL-CCNC: 10 IU/L
BASOPHILS # BLD: 0.01 TH/MM3
BASOPHILS # BLD: 0.04 TH/MM3
BASOPHILS NFR BLD: 0.2 %
BASOPHILS NFR BLD: 0.8 %
BILIRUB SERPL-MCNC: 0.5 MG/DL
BUN SERPL-MCNC: 54 MG/DL
BUN/CREAT SERPL: 8 %
CALCIUM SERPL-MCNC: 9.4 MG/DL
CHLORIDE SERPL-SCNC: 108 MEQ/L
CO2 SERPL-SCNC: 21 MEQ/L
CREAT SERPL-MCNC: 6.77 MG/DL
EOSINOPHIL # BLD: 0.35 TH/MM3
EOSINOPHIL # BLD: 0.41 TH/MM3
EOSINOPHIL NFR BLD: 5.8 %
EOSINOPHIL NFR BLD: 8.3 %
ERYTHROCYTE [DISTWIDTH] IN BLOOD BY AUTOMATED COUNT: 22.4 %
ERYTHROCYTE [DISTWIDTH] IN BLOOD BY AUTOMATED COUNT: 22.7 %
GFR SERPL CREATININE-BSD FRML MDRD: 8
GLUCOSE SERPL-MCNC: 96 MG/DL
GRANULOCYTES # BLD: 2.65 TH/MM3
GRANULOCYTES # BLD: 4.44 TH/MM3
GRANULOCYTES NFR BLD: 53.8 %
GRANULOCYTES NFR BLD: 73.8 %
HCT VFR BLD AUTO: 23.5 %
HCT VFR BLD AUTO: 24.5 %
HGB BLD-MCNC: 7.3 G/DL
HGB BLD-MCNC: 7.4 G/DL
IMM GRANULOCYTES # BLD: 0.02 TH/MM3
IMM GRANULOCYTES # BLD: 0.07 TH/MM3
IMM GRANULOCYTES NFR BLD: 0.3 %
IMM GRANULOCYTES NFR BLD: 1.4 %
KAPPA LC FREE SER-MCNC: 44.6 MG/L
KAPPA LC FREE/LAMBDA FREE SER: 1.81
LAMBDA LC FREE SERPL-MCNC: 24.6 MG/L
LYMPHOCYTES # BLD: 0.67 TH/MM3
LYMPHOCYTES # BLD: 1.27 TH/MM3
LYMPHOCYTES NFR BLD: 11.1 %
LYMPHOCYTES NFR BLD: 25.8 %
MCH RBC QN AUTO: 27.1 PG
MCH RBC QN AUTO: 27.7 PG
MCHC RBC AUTO-ENTMCNC: 30.2 G/DL
MCHC RBC AUTO-ENTMCNC: 31.1 G/DL
MCV RBC AUTO: 89 FL
MCV RBC AUTO: 89.7 FL
MONOCYTES # BLD: 0.49 TH/MM3
MONOCYTES # BLD: 0.53 TH/MM3
MONOCYTES NFR BLD: 8.8 %
MONOCYTES NFR BLD: 9.9 %
PLATELET # BLD: 34 TH/MM3
PLATELET # BLD: 39 TH/MM3
POTASSIUM SERPL-SCNC: 4.7 MMOL/L
PROT SERPL-MCNC: 5.3 G/DL
RBC # BLD AUTO: 2.64 MIL/MM3
RBC # BLD AUTO: 2.73 MIL/MM3
SODIUM SERPL-SCNC: 140 MEQ/L
WBC # BLD: 4.93 TH/MM3
WBC # BLD: 6.02 TH/MM3

## 2017-11-01 ENCOUNTER — APPOINTMENT (OUTPATIENT)
Dept: HEMATOLOGY ONCOLOGY | Facility: CLINIC | Age: 67
End: 2017-11-01

## 2017-11-02 LAB
BASOPHILS # BLD: 0.02 TH/MM3
BASOPHILS NFR BLD: 0.9 %
EOSINOPHIL # BLD: 0.3 TH/MM3
EOSINOPHIL NFR BLD: 13.2 %
ERYTHROCYTE [DISTWIDTH] IN BLOOD BY AUTOMATED COUNT: 22.7 %
GRANULOCYTES # BLD: 0.66 TH/MM3
GRANULOCYTES NFR BLD: 29.1 %
HCT VFR BLD AUTO: 23.7 %
HGB BLD-MCNC: 7.3 G/DL
IMM GRANULOCYTES # BLD: 0.03 TH/MM3
IMM GRANULOCYTES NFR BLD: 1.3 %
LYMPHOCYTES # BLD: 0.87 TH/MM3
LYMPHOCYTES NFR BLD: 38.3 %
MCH RBC QN AUTO: 27.5 PG
MCHC RBC AUTO-ENTMCNC: 30.8 G/DL
MCV RBC AUTO: 89.4 FL
MONOCYTES # BLD: 0.39 TH/MM3
MONOCYTES NFR BLD: 17.2 %
PLATELET # BLD: 34 TH/MM3
RBC # BLD AUTO: 2.65 MIL/MM3
WBC # BLD: 2.27 TH/MM3

## 2017-11-08 ENCOUNTER — APPOINTMENT (OUTPATIENT)
Dept: INFUSION THERAPY | Facility: CLINIC | Age: 67
End: 2017-11-08

## 2017-11-08 VITALS
HEART RATE: 84 BPM | RESPIRATION RATE: 18 BRPM | TEMPERATURE: 96.7 F | SYSTOLIC BLOOD PRESSURE: 168 MMHG | DIASTOLIC BLOOD PRESSURE: 81 MMHG

## 2017-11-09 LAB
ALBUMIN SERPL-MCNC: 3.3 G/DL
ALBUMIN/GLOB SERPL: 1.94
ALP SERPL-CCNC: 49 IU/L
ALT SERPL-CCNC: 11 IU/L
ANION GAP SERPL CALC-SCNC: 11 MEQ/L
AST SERPL-CCNC: 12 IU/L
BASOPHILS # BLD: 0.06 TH/MM3
BASOPHILS NFR BLD: 2.2 %
BILIRUB SERPL-MCNC: 0.6 MG/DL
BUN SERPL-MCNC: 56 MG/DL
BUN/CREAT SERPL: 8.9 %
CALCIUM SERPL-MCNC: 9.3 MG/DL
CHLORIDE SERPL-SCNC: 107 MEQ/L
CO2 SERPL-SCNC: 21 MEQ/L
CREAT SERPL-MCNC: 6.32 MG/DL
EOSINOPHIL # BLD: 0.17 TH/MM3
EOSINOPHIL NFR BLD: 6.1 %
ERYTHROCYTE [DISTWIDTH] IN BLOOD BY AUTOMATED COUNT: 22.8 %
GFR SERPL CREATININE-BSD FRML MDRD: 9
GLUCOSE SERPL-MCNC: 111 MG/DL
GRANULOCYTES # BLD: 0.98 TH/MM3
GRANULOCYTES NFR BLD: 35.1 %
HCT VFR BLD AUTO: 24.2 %
HGB BLD-MCNC: 7.3 G/DL
IMM GRANULOCYTES # BLD: 0.01 TH/MM3
IMM GRANULOCYTES NFR BLD: 0.4 %
LYMPHOCYTES # BLD: 1.22 TH/MM3
LYMPHOCYTES NFR BLD: 43.7 %
MCH RBC QN AUTO: 27.2 PG
MCHC RBC AUTO-ENTMCNC: 30.2 G/DL
MCV RBC AUTO: 90.3 FL
MONOCYTES # BLD: 0.35 TH/MM3
MONOCYTES NFR BLD: 12.5 %
PLATELET # BLD: 53 TH/MM3
POTASSIUM SERPL-SCNC: 4 MMOL/L
PROT SERPL-MCNC: 5 G/DL
RBC # BLD AUTO: 2.68 MIL/MM3
SODIUM SERPL-SCNC: 139 MEQ/L
WBC # BLD: 2.79 TH/MM3

## 2017-11-13 LAB
KAPPA LC FREE SER-MCNC: 35.9 MG/L
KAPPA LC FREE/LAMBDA FREE SER: 1.54
LAMBDA LC FREE SERPL-MCNC: 23.3 MG/L

## 2017-11-20 ENCOUNTER — APPOINTMENT (OUTPATIENT)
Dept: HEMATOLOGY ONCOLOGY | Facility: CLINIC | Age: 67
End: 2017-11-20

## 2017-11-20 VITALS
HEIGHT: 66 IN | SYSTOLIC BLOOD PRESSURE: 136 MMHG | RESPIRATION RATE: 18 BRPM | TEMPERATURE: 96.1 F | WEIGHT: 161 LBS | HEART RATE: 81 BPM | BODY MASS INDEX: 25.88 KG/M2 | DIASTOLIC BLOOD PRESSURE: 72 MMHG

## 2017-11-20 LAB
BASOPHILS # BLD: 0 TH/MM3
BASOPHILS NFR BLD: 0 %
EOSINOPHIL # BLD: 0.15 TH/MM3
EOSINOPHIL NFR BLD: 3.7 %
ERYTHROCYTE [DISTWIDTH] IN BLOOD BY AUTOMATED COUNT: 23 %
GRANULOCYTES # BLD: 2.28 TH/MM3
GRANULOCYTES NFR BLD: 56.3 %
HCT VFR BLD AUTO: 23.4 %
HGB BLD-MCNC: 7.1 G/DL
IMM GRANULOCYTES # BLD: 0.01 TH/MM3
IMM GRANULOCYTES NFR BLD: 0.2 %
LYMPHOCYTES # BLD: 1.38 TH/MM3
LYMPHOCYTES NFR BLD: 34.1 %
MCH RBC QN AUTO: 28 PG
MCHC RBC AUTO-ENTMCNC: 30.3 G/DL
MCV RBC AUTO: 92.1 FL
MONOCYTES # BLD: 0.23 TH/MM3
MONOCYTES NFR BLD: 5.7 %
PLATELET # BLD: 56 TH/MM3
RBC # BLD AUTO: 2.54 MIL/MM3
WBC # BLD: 4.05 TH/MM3

## 2017-11-21 LAB
ALBUMIN SERPL-MCNC: 3.5 G/DL
ALBUMIN/GLOB SERPL: 1.94
ALP SERPL-CCNC: 48 IU/L
ALT SERPL-CCNC: 7 IU/L
ANION GAP SERPL CALC-SCNC: 10 MEQ/L
AST SERPL-CCNC: 11 IU/L
BILIRUB SERPL-MCNC: 0.5 MG/DL
BUN SERPL-MCNC: 63 MG/DL
BUN/CREAT SERPL: 10 %
CALCIUM SERPL-MCNC: 9.4 MG/DL
CHLORIDE SERPL-SCNC: 108 MEQ/L
CO2 SERPL-SCNC: 22 MEQ/L
CREAT SERPL-MCNC: 6.29 MG/DL
GFR SERPL CREATININE-BSD FRML MDRD: 9
GLUCOSE SERPL-MCNC: 124 MG/DL
KAPPA LC FREE SER-MCNC: 20.5 MG/L
KAPPA LC FREE/LAMBDA FREE SER: 1.64
LAMBDA LC FREE SERPL-MCNC: 12.5 MG/L
POTASSIUM SERPL-SCNC: 4.3 MMOL/L
PROT SERPL-MCNC: 5.3 G/DL
SODIUM SERPL-SCNC: 140 MEQ/L

## 2017-11-22 ENCOUNTER — APPOINTMENT (OUTPATIENT)
Dept: INFUSION THERAPY | Facility: CLINIC | Age: 67
End: 2017-11-22

## 2017-11-22 ENCOUNTER — RESULT REVIEW (OUTPATIENT)
Age: 67
End: 2017-11-22

## 2017-11-29 ENCOUNTER — APPOINTMENT (OUTPATIENT)
Dept: INFUSION THERAPY | Facility: CLINIC | Age: 67
End: 2017-11-29

## 2017-11-29 ENCOUNTER — OUTPATIENT (OUTPATIENT)
Dept: OUTPATIENT SERVICES | Facility: HOSPITAL | Age: 67
LOS: 1 days | Discharge: HOME | End: 2017-11-29

## 2017-11-29 DIAGNOSIS — C90.02 MULTIPLE MYELOMA IN RELAPSE: ICD-10-CM

## 2017-11-29 DIAGNOSIS — D63.1 ANEMIA IN CHRONIC KIDNEY DISEASE: ICD-10-CM

## 2017-11-29 LAB
BASOPHILS # BLD: 0.01 TH/MM3
BASOPHILS NFR BLD: 0.2 %
EOSINOPHIL # BLD: 0.2 TH/MM3
EOSINOPHIL NFR BLD: 4.5 %
ERYTHROCYTE [DISTWIDTH] IN BLOOD BY AUTOMATED COUNT: 23.4 %
GRANULOCYTES # BLD: 2.22 TH/MM3
GRANULOCYTES NFR BLD: 50 %
HCT VFR BLD AUTO: 22.4 %
HGB BLD-MCNC: 6.9 G/DL
IMM GRANULOCYTES # BLD: 0.03 TH/MM3
IMM GRANULOCYTES NFR BLD: 0.7 %
LYMPHOCYTES # BLD: 1.45 TH/MM3
LYMPHOCYTES NFR BLD: 32.7 %
MCH RBC QN AUTO: 28.9 PG
MCHC RBC AUTO-ENTMCNC: 30.8 G/DL
MCV RBC AUTO: 93.7 FL
MONOCYTES # BLD: 0.53 TH/MM3
MONOCYTES NFR BLD: 11.9 %
PLATELET # BLD: 52 TH/MM3
RBC # BLD AUTO: 2.39 MIL/MM3
WBC # BLD: 4.44 TH/MM3

## 2017-12-06 ENCOUNTER — RESULT REVIEW (OUTPATIENT)
Age: 67
End: 2017-12-06

## 2017-12-06 ENCOUNTER — APPOINTMENT (OUTPATIENT)
Dept: INFUSION THERAPY | Facility: CLINIC | Age: 67
End: 2017-12-06

## 2017-12-07 LAB
ALBUMIN SERPL-MCNC: 3.8 G/DL
ALBUMIN/GLOB SERPL: 2.11
ALP SERPL-CCNC: 57 IU/L
ALT SERPL-CCNC: 8 IU/L
ANION GAP SERPL CALC-SCNC: 13 MEQ/L
AST SERPL-CCNC: 14 IU/L
BILIRUB SERPL-MCNC: 0.5 MG/DL
BUN SERPL-MCNC: 55 MG/DL
BUN/CREAT SERPL: 9.4 %
CALCIUM SERPL-MCNC: 9.6 MG/DL
CHLORIDE SERPL-SCNC: 107 MEQ/L
CO2 SERPL-SCNC: 19 MEQ/L
CREAT SERPL-MCNC: 5.85 MG/DL
GFR SERPL CREATININE-BSD FRML MDRD: 10
GLUCOSE SERPL-MCNC: 108 MG/DL
POTASSIUM SERPL-SCNC: 4.6 MMOL/L
PROT SERPL-MCNC: 5.6 G/DL
SODIUM SERPL-SCNC: 139 MEQ/L
TSH SERPL DL<=0.005 MIU/L-ACNC: 1.42 UIU/ML

## 2017-12-13 ENCOUNTER — APPOINTMENT (OUTPATIENT)
Dept: INFUSION THERAPY | Facility: CLINIC | Age: 67
End: 2017-12-13

## 2017-12-14 LAB
BASOPHILS # BLD: 0.02 TH/MM3
BASOPHILS NFR BLD: 0.3 %
EOSINOPHIL # BLD: 0.13 TH/MM3
EOSINOPHIL NFR BLD: 2.2 %
ERYTHROCYTE [DISTWIDTH] IN BLOOD BY AUTOMATED COUNT: 24.4 %
GRANULOCYTES # BLD: 3.45 TH/MM3
GRANULOCYTES NFR BLD: 58.8 %
HCT VFR BLD AUTO: 24.4 %
HGB BLD-MCNC: 7.5 G/DL
IMM GRANULOCYTES # BLD: 0.04 TH/MM3
IMM GRANULOCYTES NFR BLD: 0.7 %
LYMPHOCYTES # BLD: 1.4 TH/MM3
LYMPHOCYTES NFR BLD: 23.9 %
MCH RBC QN AUTO: 29.6 PG
MCHC RBC AUTO-ENTMCNC: 30.7 G/DL
MCV RBC AUTO: 96.4 FL
MONOCYTES # BLD: 0.83 TH/MM3
MONOCYTES NFR BLD: 14.1 %
PLATELET # BLD: 55 TH/MM3
RBC # BLD AUTO: 2.53 MIL/MM3
WBC # BLD: 5.87 TH/MM3

## 2017-12-18 ENCOUNTER — APPOINTMENT (OUTPATIENT)
Dept: HEMATOLOGY ONCOLOGY | Facility: CLINIC | Age: 67
End: 2017-12-18

## 2017-12-18 VITALS
HEART RATE: 86 BPM | HEIGHT: 66 IN | BODY MASS INDEX: 26.52 KG/M2 | SYSTOLIC BLOOD PRESSURE: 139 MMHG | TEMPERATURE: 97.2 F | DIASTOLIC BLOOD PRESSURE: 71 MMHG | WEIGHT: 165 LBS | RESPIRATION RATE: 18 BRPM

## 2017-12-18 RX ORDER — CEPHALEXIN 500 MG/1
500 CAPSULE ORAL
Qty: 40 | Refills: 0 | Status: COMPLETED | COMMUNITY
Start: 2017-09-26

## 2017-12-18 RX ORDER — LENALIDOMIDE 15 MG/1
15 CAPSULE ORAL
Qty: 10 | Refills: 0 | Status: COMPLETED | COMMUNITY
Start: 2017-06-09

## 2017-12-19 LAB
ALBUMIN SERPL-MCNC: 3.5 G/DL
ALBUMIN/GLOB SERPL: 1.84
ALP SERPL-CCNC: 52 IU/L
ALT SERPL-CCNC: 10 IU/L
ANION GAP SERPL CALC-SCNC: 8 MEQ/L
AST SERPL-CCNC: 12 IU/L
BASOPHILS # BLD: 0.01 TH/MM3
BASOPHILS NFR BLD: 0.2 %
BILIRUB SERPL-MCNC: 0.7 MG/DL
BUN SERPL-MCNC: 68 MG/DL
BUN/CREAT SERPL: 11.3 %
CALCIUM SERPL-MCNC: 9.1 MG/DL
CHLORIDE SERPL-SCNC: 107 MEQ/L
CO2 SERPL-SCNC: 23 MEQ/L
CREAT SERPL-MCNC: 6.03 MG/DL
EOSINOPHIL # BLD: 0.11 TH/MM3
EOSINOPHIL NFR BLD: 1.9 %
ERYTHROCYTE [DISTWIDTH] IN BLOOD BY AUTOMATED COUNT: 24.3 %
GFR SERPL CREATININE-BSD FRML MDRD: 9
GLUCOSE SERPL-MCNC: 92 MG/DL
GRANULOCYTES # BLD: 4.02 TH/MM3
GRANULOCYTES NFR BLD: 70.2 %
HCT VFR BLD AUTO: 26 %
HGB BLD-MCNC: 7.9 G/DL
IMM GRANULOCYTES # BLD: 0.01 TH/MM3
IMM GRANULOCYTES NFR BLD: 0.2 %
KAPPA LC FREE SER-MCNC: 24.1 MG/L
KAPPA LC FREE/LAMBDA FREE SER: 1.54
LAMBDA LC FREE SERPL-MCNC: 15.6 MG/L
LYMPHOCYTES # BLD: 1.05 TH/MM3
LYMPHOCYTES NFR BLD: 18.3 %
MCH RBC QN AUTO: 29.9 PG
MCHC RBC AUTO-ENTMCNC: 30.4 G/DL
MCV RBC AUTO: 98.5 FL
MONOCYTES # BLD: 0.53 TH/MM3
MONOCYTES NFR BLD: 9.2 %
PLATELET # BLD: 67 TH/MM3
POTASSIUM SERPL-SCNC: 4.1 MMOL/L
PROT SERPL-MCNC: 5.4 G/DL
RBC # BLD AUTO: 2.64 MIL/MM3
SODIUM SERPL-SCNC: 138 MEQ/L
WBC # BLD: 5.73 TH/MM3

## 2017-12-20 ENCOUNTER — APPOINTMENT (OUTPATIENT)
Dept: INFUSION THERAPY | Facility: CLINIC | Age: 67
End: 2017-12-20

## 2017-12-20 LAB
BASOPHILS # BLD: 0.03 TH/MM3
BASOPHILS NFR BLD: 0.7 %
EOSINOPHIL # BLD: 0.15 TH/MM3
EOSINOPHIL NFR BLD: 3.7 %
ERYTHROCYTE [DISTWIDTH] IN BLOOD BY AUTOMATED COUNT: 24.1 %
GRANULOCYTES # BLD: 2.15 TH/MM3
GRANULOCYTES NFR BLD: 53.1 %
HCT VFR BLD AUTO: 25.4 %
HGB BLD-MCNC: 7.8 G/DL
IMM GRANULOCYTES # BLD: 0.06 TH/MM3
IMM GRANULOCYTES NFR BLD: 1.5 %
LYMPHOCYTES # BLD: 1.07 TH/MM3
LYMPHOCYTES NFR BLD: 26.4 %
MCH RBC QN AUTO: 30.1 PG
MCHC RBC AUTO-ENTMCNC: 30.7 G/DL
MCV RBC AUTO: 98.1 FL
MONOCYTES # BLD: 0.59 TH/MM3
MONOCYTES NFR BLD: 14.6 %
PLATELET # BLD: 71 TH/MM3
RBC # BLD AUTO: 2.59 MIL/MM3
WBC # BLD: 4.05 TH/MM3

## 2017-12-27 ENCOUNTER — APPOINTMENT (OUTPATIENT)
Dept: INFUSION THERAPY | Facility: CLINIC | Age: 67
End: 2017-12-27

## 2017-12-27 ENCOUNTER — APPOINTMENT (OUTPATIENT)
Dept: HEMATOLOGY ONCOLOGY | Facility: CLINIC | Age: 67
End: 2017-12-27

## 2017-12-28 LAB
BASOPHILS # BLD: 0.04 TH/MM3
BASOPHILS NFR BLD: 0.8 %
EOSINOPHIL # BLD: 0.1 TH/MM3
EOSINOPHIL NFR BLD: 2.1 %
ERYTHROCYTE [DISTWIDTH] IN BLOOD BY AUTOMATED COUNT: 23.3 %
GRANULOCYTES # BLD: 2.34 TH/MM3
GRANULOCYTES NFR BLD: 49.1 %
HCT VFR BLD AUTO: 27.3 %
HGB BLD-MCNC: 8.3 G/DL
IMM GRANULOCYTES # BLD: 0.06 TH/MM3
IMM GRANULOCYTES NFR BLD: 1.3 %
LYMPHOCYTES # BLD: 1.45 TH/MM3
LYMPHOCYTES NFR BLD: 30.5 %
MCH RBC QN AUTO: 29.7 PG
MCHC RBC AUTO-ENTMCNC: 30.4 G/DL
MCV RBC AUTO: 97.8 FL
MONOCYTES # BLD: 0.77 TH/MM3
MONOCYTES NFR BLD: 16.2 %
PLATELET # BLD: 90 TH/MM3
RBC # BLD AUTO: 2.79 MIL/MM3
WBC # BLD: 4.76 TH/MM3

## 2018-01-03 ENCOUNTER — APPOINTMENT (OUTPATIENT)
Dept: INFUSION THERAPY | Facility: CLINIC | Age: 68
End: 2018-01-03

## 2018-01-04 LAB
ALBUMIN SERPL-MCNC: 3.6 G/DL
ALBUMIN/GLOB SERPL: 2.57
ALP SERPL-CCNC: 56 IU/L
ALT SERPL-CCNC: 13 IU/L
ANION GAP SERPL CALC-SCNC: 9 MEQ/L
AST SERPL-CCNC: 39 IU/L
BASOPHILS # BLD: 0.02 TH/MM3
BASOPHILS NFR BLD: 0.3 %
BILIRUB SERPL-MCNC: 1.6 MG/DL
BUN SERPL-MCNC: 56 MG/DL
BUN/CREAT SERPL: 10.1 %
CALCIUM SERPL-MCNC: 9.2 MG/DL
CHLORIDE SERPL-SCNC: 108 MEQ/L
CO2 SERPL-SCNC: 20 MEQ/L
CREAT SERPL-MCNC: 5.53 MG/DL
EOSINOPHIL # BLD: 0.12 TH/MM3
EOSINOPHIL NFR BLD: 1.9 %
ERYTHROCYTE [DISTWIDTH] IN BLOOD BY AUTOMATED COUNT: 24 %
GFR SERPL CREATININE-BSD FRML MDRD: 10
GLUCOSE SERPL-MCNC: 91 MG/DL
GRANULOCYTES # BLD: 5.35 TH/MM3
GRANULOCYTES NFR BLD: 82.7 %
HCT VFR BLD AUTO: 30.1 %
HGB BLD-MCNC: 8.9 G/DL
IMM GRANULOCYTES # BLD: 0.04 TH/MM3
IMM GRANULOCYTES NFR BLD: 0.6 %
LYMPHOCYTES # BLD: 0.68 TH/MM3
LYMPHOCYTES NFR BLD: 10.5 %
MCH RBC QN AUTO: 29.5 PG
MCHC RBC AUTO-ENTMCNC: 29.6 G/DL
MCV RBC AUTO: 99.7 FL
MONOCYTES # BLD: 0.26 TH/MM3
MONOCYTES NFR BLD: 4 %
PLATELET # BLD: 127 TH/MM3
POTASSIUM SERPL-SCNC: 6 MMOL/L
PROT SERPL-MCNC: 5 G/DL
RBC # BLD AUTO: 3.02 MIL/MM3
SODIUM SERPL-SCNC: 137 MEQ/L
WBC # BLD: 6.47 TH/MM3

## 2018-01-05 LAB
KAPPA LC FREE SER-MCNC: 20.8 MG/L
KAPPA LC FREE/LAMBDA FREE SER: 1.53
LAMBDA LC FREE SERPL-MCNC: 13.6 MG/L

## 2018-01-10 ENCOUNTER — APPOINTMENT (OUTPATIENT)
Dept: INFUSION THERAPY | Facility: CLINIC | Age: 68
End: 2018-01-10

## 2018-01-17 ENCOUNTER — APPOINTMENT (OUTPATIENT)
Dept: INFUSION THERAPY | Facility: CLINIC | Age: 68
End: 2018-01-17

## 2018-01-18 LAB
BASOPHILS # BLD: 0.04 TH/MM3
BASOPHILS NFR BLD: 1.1 %
EOSINOPHIL # BLD: 0.27 TH/MM3
EOSINOPHIL NFR BLD: 7.2 %
ERYTHROCYTE [DISTWIDTH] IN BLOOD BY AUTOMATED COUNT: 21.7 %
GRANULOCYTES # BLD: 1.64 TH/MM3
GRANULOCYTES NFR BLD: 43.6 %
HCT VFR BLD AUTO: 30.9 %
HGB BLD-MCNC: 9.4 G/DL
IMM GRANULOCYTES # BLD: 0.05 TH/MM3
IMM GRANULOCYTES NFR BLD: 1.3 %
LYMPHOCYTES # BLD: 1.45 TH/MM3
LYMPHOCYTES NFR BLD: 38.6 %
MCH RBC QN AUTO: 28.7 PG
MCHC RBC AUTO-ENTMCNC: 30.4 G/DL
MCV RBC AUTO: 94.2 FL
MONOCYTES # BLD: 0.31 TH/MM3
MONOCYTES NFR BLD: 8.2 %
PLATELET # BLD: 72 TH/MM3
RBC # BLD AUTO: 3.28 MIL/MM3
WBC # BLD: 3.76 TH/MM3

## 2018-01-22 ENCOUNTER — APPOINTMENT (OUTPATIENT)
Dept: HEMATOLOGY ONCOLOGY | Facility: CLINIC | Age: 68
End: 2018-01-22

## 2018-01-22 VITALS
BODY MASS INDEX: 24.43 KG/M2 | DIASTOLIC BLOOD PRESSURE: 90 MMHG | HEIGHT: 66 IN | WEIGHT: 152 LBS | SYSTOLIC BLOOD PRESSURE: 152 MMHG | TEMPERATURE: 97.6 F | HEART RATE: 93 BPM | RESPIRATION RATE: 18 BRPM

## 2018-01-22 NOTE — HISTORY OF PRESENT ILLNESS
[Therapy: ___] : Therapy: [unfilled] [de-identified] : REASON FOR FOLLOWUP:  Maintenance Velcade for lambda light chain myeloma, status post autologous stem cell transplant.\par \par TREATMENT HISTORY:  On 05/02/2015, he was diagnosed with lambda light chain multiple myeloma when he had a syncopal episode in [place] and was found to be in acute renal failure.  He underwent a bone marrow biopsy that confirmed lambda light chain multiple myeloma.  His beta2 microglobulin was initially 27.1 on diagnosis.  His lambda light chain prior to treatment was as high as 28528.2 on 02/27/2015.  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  There was also diffuse acute tubular injury and modular tubular atrophy with interstitial fibrosis initially on dialysis, but currently been off dialysis for approximately one year.\par \par His treatment summary is as follows.  He initiated Velcade, Cytoxan, and dexamethasone, cycle started on 03/13/2015.  He completed four cycles on 05/08/2015.  He then underwent an autologous bone marrow transplant in 07/2015 at [facility].  He has been on maintenance Velcade but in 6/17 was started on DRD due to  rising lambda with normal kappa.\par \par Mr. Motta is a very pleasant 66yearold gentleman with history of lambda light chain multiple myeloma, treated as above.\par  [FreeTextEntry1] : DRD started  on 6/21/17 [de-identified] : He presents today for followup she is currently on maintenance Velcade.his blood work from October 27 demonstrated a stage a faint lambda band on urine immunofixation. Nomi a count 7.66 hemoglobin 8.3 platelet count of 132. The SPEP was normal. Creatinine was stable at 5.93 potassium was 5.2 total protein 5.9 unremarkable LFTs free kappa lambda ratio normal 1.27 with free kappa at 42 and free lambda at 33.1. Most recent blood work from 1110 2016 shows a hemoglobin of 7.8. \par \par he was recently seen by Dr. Rod in  [place]. He had blood work done and reports that everything was stable. Dr. Rod also recommended to decrease his Velcade dose to 1.3 mg/m2\par \par He has no complaints. He denies any neuropathy.\par \par 1/4/17\par Presents for follow up today. Doing well on Maintenance Velcade.  No complaints. No neuropathy. \par \par 2/1/17\par He presents for follow up today. He has no complaints. Tolerating Velcade well.\par \par 4/5/17\par Doing well No complaints blood work from last visit was stable.\par \par 5/1/17\par He presents for follow up. He had increasing lambda light chains. Repeat was stable. A bone marrow showed minimal plasma cells and PET CT did not show any new activity.  He has no complaints.\par \par 6/5/2017\par Presents for follow up. He is doing well. He has some fatigue. His  Lambda has been slowly increasing. He has no other complaints.  No neuropathy\par \par 6/12/17\par He was brought back to discuss his lab work. His Lambda has been increasing with stable Kappa as well as slight worsening of his of creatinine He has no complaints.\par \par 7/12/17\par He has been on DRD. His HgB dropped to 5.9 and he is s/p PRBC transfusion.  He finished Revlimid on 7/9. His only complaint is leg crams at night. \par \par 8/9/17\par He is here for follow up. His cramps went away with the lower dose of Revlimid. His His lambda is coming down. He saw Dr. Rod last week as per patient.\par \par 8/30/27\par He is doing well. His legs cramps are better. He has severe anemia but he is asymptomatic. HIs creatinine is better and his light chain is at baseline.\par \par 9/27/17\par He is here for follow up. He needed  blood transfusion and hgb has been stable at 8 since than. He is also status post Venofer x2. He is on Procrit\par  40,000 units every 2 weeks. He feels well otherwise. \par \par 11/20/17\par Patient feels well and has no complaints. States he was taking Revlimid 5mg q48hrs prior to stopping for low counts. Patient will get Procrit and Darzalex. Patient will be restarted on Revilimid 2.5mg. Patient will continue every 2week Darzalex until week 25 then it becomes every 4 weeks. \par \par 12/18/17\par He is here for follow up. He is doing well. Blood work from last month still in CR. Cr and counts are better. He has no complaints and feel great.\par \par 1/22/18\par He is here for follow up. He is recovering from a cold. He is taking a prednisone taper given by his PMD. Had mules pain from cough.

## 2018-01-22 NOTE — ASSESSMENT
[FreeTextEntry1] : 1.Free light chain multiple myeloma, specifically lambda.  Initial beta-2 was 27.1.  His bone marrow cytogenetics was normal.  He is status post four cycles of VCD.  This is followed by autologous bone marrow transplant in 07/2015,he has been on  weekly Velcade maintenance, He is also being followed by Dr. Krysta Rod in [place].  He currently takes his acyclovir.  Last myeloma panel  showed increasing lambda light chains but marrow showed minimal plasma cells with PET CT no progression.  HIs lambda continues to increase supporting biochemical recurrence with worsening creatinine  He was started on Daratumumab, Revlimid 5 mg daily d1-21 and  Dexamethasone  40 mg weekly PO with great response and is nos most likley in CR. Patient was taking Revilimid every other day and it was held for pancytopenia he is now on Revlamid 2.5 mg every other day d1-21 every 28 days takes 10 tabs.\par \par 2.Chronic kidney disease from multiple myeloma.  He has not been on dialysis in the past but is no off.  The creatinine has been relatively stable.  He follows up with Dr. Allen.\par \par 3.Normocytic anemia, related to chronic kidney disease and Revlimid  . Procrit  40,000 units every 2 weeks and s/p IV iron will keep saturation over 20% and Ferritin over 100. Will increase Procrit if needed and or transfuse . His HGB has been improving\par \par \par 4. Moderate to severe   thrombocytopenia most likely from Revlimid. Platelets improved\par \par \par \par PLAN:\par -free light chains are  in the normal/expected range due to CKD.\par -Will proceed with  treatment \par - revlimid 2.5mg every other day dexa and arianna same dose\par -Check CMP, CBC and light chains\par -anemia on Procrit 60,000 units weekly. Responding will continue\par -RTC in one month\par \par

## 2018-01-22 NOTE — PHYSICAL EXAM
[Fully active, able to carry on all pre-disease performance without restriction] : Status 0 - Fully active, able to carry on all pre-disease performance without restriction [Normal] : grossly intact

## 2018-01-22 NOTE — CONSULT LETTER
[Dear  ___] : Dear  [unfilled], [Referral Closing:] : Thank you very much for seeing this patient.  If you have any questions, please do not hesitate to contact me. [Sincerely,] : Sincerely, [DrOrquidea  ___] : Dr. RECIO [FreeTextEntry3] : Andrew Pantoja

## 2018-01-22 NOTE — RESULTS/DATA
[FreeTextEntry1] :  Result Name Results Units Reference Range \par      PET CT WHOLE BODY TOP OF SKULL TO TI   SEE TEXT       \par     Patient Name: MOR, AUGUST : 1950\par Patient ID: [id number]\par Account: [id number]\par Patient Location: [facility]\par Accession: [id number]\par Procedure: PET CT WHOLE BODY TOP OF SKULL TO TIP OF TOES SUBSEQUENT 250-0036\par Date of Exam: 2017 11:09 AM\par Attending Physician: JARVIS LOW\par Requesting Physician: JARVIS LOW MD\par Clinical History / Reason for exam: FDG PET CT STUDY:\par Reason for examination: Tumor imaging - PET with concurrently acquired CT for\par attenuation correction and anatomic localization;  top of the skull  to toes/\par [address]885 multiple myeloma, subsequent treatment strategy.\par ICD-10 code:C90.0\par History: 66-year-old male patient with history of multiple myeloma, last\par chemotherapy received was in 2017. Patient had bone biopsy on\par 2017.\par Blood glucose pre injection 102 mg/dL\par Technique:\par Approximately 45 minutes after the intravenous administration of 13.4  mCi\par 18-Fluorine FDG, whole body PET images were acquired from top of  the skull to\par toes. In addition, non-iv and non oral contrast, low dose, non - diagnostic CT\par was acquired for attenuation correction and anatomic correlation only.\par Findings:\par Comparison: Prior PET CT study done on July 3, 2015. Correlation was performed\par with the skeletal survey done on 2016.\par Head /Neck: Physiologic FDG uptake is seen in the visualized region of the\par brain, pharyngeal lymphoid tissue, and salivary glands.\par Chest:\par Physiologic FDG avid PET is seen in the mediastinal blood pool and myocardium.\par Chest wall: Unremarkable\par Lungs: No abnormal uptake.\par Mediastinum/Pleura/pericardium: No abnormal uptake.\par Thoracic/ axillary lymph nodes: No abnormal uptake.\par Hepatobiliary: Unremarkable.\par Gallbladder: Multiple gallstones.\par Spleen: No abnormal uptake\par Pancreas: No abnormal uptake.\par Adrenal glands: No abnormal uptake.\par Kidneys/ureters/bladder: Normal excretory activity is demonstrated.\par Abdominal pelvic lymph nodes: No abnormal uptake.\par Bowel/peritoneum/mesentery: No abnormal uptake.\par Pelvic organs: Unremarkable. No abnormal increased uptake. Prosthetic\par calcifications.\par Bones/soft tissues: Osteoarthritic changes are identified in the bones. \par Patient\par has multiple extensive lytic lesions in the skeletal, seen in the skeletal\par survey  for details see the report  on 2016.\par In the prior examination there was PET positive lytic lesion right fifth rib\par with a max SUV 2.6, now non-FDG avid   and L5 vertebral body on the left side\par with a max SUV 3.8, now non-FDG avid with multiple other scattered lytic\par lesions, non-FDG avid.\par No abnormal increased uptake is seen in the lower extremities.\par Impression:\par 1. No new areas of abnormal increased uptake is seen.\par 2. Previously seen abnormal increased uptake seen in the right fifth rib and \par L5\par vertebral body on the left side, at this time both regions are non-FDG avid.\par 3. Multiple extensive lytic lesions in the skeletal consistent with multiple\par myeloma.\par 4. No other areas of abnormal increased uptake is seen.\par I the attending attest that I have personally reviewed these images and agree\par with this report.\par \par  \par

## 2018-01-22 NOTE — REVIEW OF SYSTEMS
[Negative] : Allergic/Immunologic [Fatigue] : no fatigue [Leg Claudication] : no intermittent leg claudication [Lower Ext Edema] : no lower extremity edema [Muscle Pain] : no muscle pain

## 2018-01-23 ENCOUNTER — OTHER (OUTPATIENT)
Age: 68
End: 2018-01-23

## 2018-01-23 LAB
ALBUMIN SERPL-MCNC: 3.5 G/DL
ALBUMIN/GLOB SERPL: 1.46
ALP SERPL-CCNC: 35 IU/L
ALT SERPL-CCNC: 10 IU/L
ANION GAP SERPL CALC-SCNC: 14 MEQ/L
AST SERPL-CCNC: 8 IU/L
BASOPHILS # BLD: 0.01 TH/MM3
BASOPHILS NFR BLD: 0.2 %
BILIRUB SERPL-MCNC: 0.4 MG/DL
BUN SERPL-MCNC: 75 MG/DL
BUN/CREAT SERPL: 11.9 %
CALCIUM SERPL-MCNC: 10.7 MG/DL
CHLORIDE SERPL-SCNC: 105 MEQ/L
CO2 SERPL-SCNC: 20 MEQ/L
CREAT SERPL-MCNC: 6.32 MG/DL
EOSINOPHIL # BLD: 0 TH/MM3
EOSINOPHIL NFR BLD: 0 %
ERYTHROCYTE [DISTWIDTH] IN BLOOD BY AUTOMATED COUNT: 21.6 %
GFR SERPL CREATININE-BSD FRML MDRD: 9
GLUCOSE SERPL-MCNC: 135 MG/DL
GRANULOCYTES # BLD: 4 TH/MM3
GRANULOCYTES NFR BLD: 76.5 %
HCT VFR BLD AUTO: 31.4 %
HGB BLD-MCNC: 9.6 G/DL
IMM GRANULOCYTES # BLD: 0 TH/MM3
IMM GRANULOCYTES NFR BLD: 0 %
KAPPA LC FREE SER-MCNC: 42.8 MG/L
KAPPA LC FREE/LAMBDA FREE SER: 1.34
LAMBDA LC FREE SERPL-MCNC: 31.9 MG/L
LYMPHOCYTES # BLD: 0.88 TH/MM3
LYMPHOCYTES NFR BLD: 16.8 %
MCH RBC QN AUTO: 28.4 PG
MCHC RBC AUTO-ENTMCNC: 30.6 G/DL
MCV RBC AUTO: 92.9 FL
MONOCYTES # BLD: 0.34 TH/MM3
MONOCYTES NFR BLD: 6.5 %
PLATELET # BLD: 96 TH/MM3
POTASSIUM SERPL-SCNC: 4.8 MMOL/L
PROT SERPL-MCNC: 5.9 G/DL
RBC # BLD AUTO: 3.38 MIL/MM3
SODIUM SERPL-SCNC: 139 MEQ/L
WBC # BLD: 5.23 TH/MM3

## 2018-01-24 ENCOUNTER — APPOINTMENT (OUTPATIENT)
Dept: INFUSION THERAPY | Facility: CLINIC | Age: 68
End: 2018-01-24

## 2018-01-24 LAB
BASOPHILS # BLD: 0.02 TH/MM3
BASOPHILS NFR BLD: 0.4 %
EOSINOPHIL # BLD: 0.01 TH/MM3
EOSINOPHIL NFR BLD: 0.2 %
ERYTHROCYTE [DISTWIDTH] IN BLOOD BY AUTOMATED COUNT: 22.3 %
GRANULOCYTES # BLD: 2.79 TH/MM3
GRANULOCYTES NFR BLD: 59.1 %
HCT VFR BLD AUTO: 29.8 %
HGB BLD-MCNC: 8.8 G/DL
IMM GRANULOCYTES # BLD: 0.06 TH/MM3
IMM GRANULOCYTES NFR BLD: 1.3 %
LYMPHOCYTES # BLD: 1.02 TH/MM3
LYMPHOCYTES NFR BLD: 21.6 %
MCH RBC QN AUTO: 28.2 PG
MCHC RBC AUTO-ENTMCNC: 29.5 G/DL
MCV RBC AUTO: 95.5 FL
MONOCYTES # BLD: 0.82 TH/MM3
MONOCYTES NFR BLD: 17.4 %
PLATELET # BLD: 85 TH/MM3
RBC # BLD AUTO: 3.12 MIL/MM3
WBC # BLD: 4.72 TH/MM3

## 2018-01-30 DIAGNOSIS — C90.00 MULTIPLE MYELOMA NOT HAVING ACHIEVED REMISSION: ICD-10-CM

## 2018-01-30 DIAGNOSIS — S37.009A UNSPECIFIED INJURY OF UNSPECIFIED KIDNEY, INITIAL ENCOUNTER: ICD-10-CM

## 2018-01-30 DIAGNOSIS — R55 SYNCOPE AND COLLAPSE: ICD-10-CM

## 2018-01-30 DIAGNOSIS — D64.9 ANEMIA, UNSPECIFIED: ICD-10-CM

## 2018-01-31 ENCOUNTER — APPOINTMENT (OUTPATIENT)
Dept: INFUSION THERAPY | Facility: CLINIC | Age: 68
End: 2018-01-31

## 2018-01-31 ENCOUNTER — RESULT REVIEW (OUTPATIENT)
Age: 68
End: 2018-01-31

## 2018-01-31 VITALS
RESPIRATION RATE: 16 BRPM | DIASTOLIC BLOOD PRESSURE: 86 MMHG | SYSTOLIC BLOOD PRESSURE: 143 MMHG | TEMPERATURE: 97.5 F | HEART RATE: 97 BPM

## 2018-02-07 ENCOUNTER — APPOINTMENT (OUTPATIENT)
Dept: INFUSION THERAPY | Facility: CLINIC | Age: 68
End: 2018-02-07

## 2018-02-07 ENCOUNTER — LABORATORY RESULT (OUTPATIENT)
Age: 68
End: 2018-02-07

## 2018-02-07 RX ORDER — ERYTHROPOIETIN 10000 [IU]/ML
60000 INJECTION, SOLUTION INTRAVENOUS; SUBCUTANEOUS ONCE
Qty: 0 | Refills: 0 | Status: COMPLETED | OUTPATIENT
Start: 2018-02-07 | End: 2018-02-07

## 2018-02-07 RX ADMIN — ERYTHROPOIETIN 60000 UNIT(S): 10000 INJECTION, SOLUTION INTRAVENOUS; SUBCUTANEOUS at 10:09

## 2018-02-08 LAB
HCT VFR BLD CALC: 33.7 %
HGB BLD-MCNC: 9.8 G/DL
MCHC RBC-ENTMCNC: 27.8 PG
MCHC RBC-ENTMCNC: 29.1 G/DL
MCV RBC AUTO: 95.5 FL
PLATELET # BLD AUTO: 80 K/UL
PMV BLD: NORMAL
RBC # BLD: 3.53 M/UL
RBC # FLD: 22.5 %
WBC # FLD AUTO: 5.05 K/UL

## 2018-02-14 ENCOUNTER — APPOINTMENT (OUTPATIENT)
Dept: INFUSION THERAPY | Facility: CLINIC | Age: 68
End: 2018-02-14

## 2018-02-14 ENCOUNTER — RX RENEWAL (OUTPATIENT)
Age: 68
End: 2018-02-14

## 2018-02-21 ENCOUNTER — APPOINTMENT (OUTPATIENT)
Dept: INFUSION THERAPY | Facility: CLINIC | Age: 68
End: 2018-02-21

## 2018-02-21 ENCOUNTER — APPOINTMENT (OUTPATIENT)
Dept: HEMATOLOGY ONCOLOGY | Facility: CLINIC | Age: 68
End: 2018-02-21

## 2018-02-21 ENCOUNTER — LABORATORY RESULT (OUTPATIENT)
Age: 68
End: 2018-02-21

## 2018-02-21 LAB
HCT VFR BLD CALC: 36 %
HGB BLD-MCNC: 10.3 G/DL
MCHC RBC-ENTMCNC: 27.2 PG
MCHC RBC-ENTMCNC: 28.6 G/DL
MCV RBC AUTO: 95.2 FL
PLATELET # BLD AUTO: 105 K/UL
PMV BLD: NORMAL
RBC # BLD: 3.78 M/UL
RBC # FLD: 21.6 %
WBC # FLD AUTO: 4.71 K/UL

## 2018-02-21 RX ORDER — ERYTHROPOIETIN 10000 [IU]/ML
60000 INJECTION, SOLUTION INTRAVENOUS; SUBCUTANEOUS ONCE
Qty: 0 | Refills: 0 | Status: COMPLETED | OUTPATIENT
Start: 2018-02-21 | End: 2018-02-21

## 2018-02-21 RX ADMIN — ERYTHROPOIETIN 60000 UNIT(S): 10000 INJECTION, SOLUTION INTRAVENOUS; SUBCUTANEOUS at 10:50

## 2018-02-22 ENCOUNTER — MOBILE ON CALL (OUTPATIENT)
Age: 68
End: 2018-02-22

## 2018-02-23 LAB
ALBUMIN SERPL ELPH-MCNC: 4.1 G/DL
ALP BLD-CCNC: 62 U/L
ALT SERPL-CCNC: 8 U/L
ANION GAP SERPL CALC-SCNC: 20 MMOL/L
AST SERPL-CCNC: 15 U/L
BILIRUB SERPL-MCNC: 0.2 MG/DL
BUN SERPL-MCNC: 53 MG/DL
CALCIUM SERPL-MCNC: 9.8 MG/DL
CHLORIDE SERPL-SCNC: 101 MMOL/L
CO2 SERPL-SCNC: 19 MMOL/L
CREAT SERPL-MCNC: 5.4 MG/DL
GLUCOSE SERPL-MCNC: 116 MG/DL
POTASSIUM SERPL-SCNC: 5 MMOL/L
PROT SERPL-MCNC: 6.6 G/DL
SODIUM SERPL-SCNC: 140 MMOL/L
T3 SERPL-MCNC: 101 NG/DL
TSH SERPL-ACNC: 0.63 UIU/ML

## 2018-02-26 ENCOUNTER — APPOINTMENT (OUTPATIENT)
Dept: HEMATOLOGY ONCOLOGY | Facility: CLINIC | Age: 68
End: 2018-02-26

## 2018-02-26 VITALS
HEIGHT: 66 IN | DIASTOLIC BLOOD PRESSURE: 77 MMHG | BODY MASS INDEX: 25.55 KG/M2 | SYSTOLIC BLOOD PRESSURE: 144 MMHG | HEART RATE: 88 BPM | WEIGHT: 159 LBS | RESPIRATION RATE: 16 BRPM | TEMPERATURE: 97.3 F

## 2018-02-26 NOTE — REVIEW OF SYSTEMS
[Fatigue] : no fatigue [Leg Claudication] : no intermittent leg claudication [Lower Ext Edema] : no lower extremity edema [SOB on Exertion] : no shortness of breath during exertion [Muscle Pain] : no muscle pain [Negative] : Allergic/Immunologic

## 2018-02-26 NOTE — ASSESSMENT
[FreeTextEntry1] : 1.Free light chain multiple myeloma, specifically lambda.  Initial beta-2 was 27.1.  His bone marrow cytogenetics was normal.  He is status post four cycles of VCD.  This is followed by autologous bone marrow transplant in 07/2015,he has been on  weekly Velcade maintenance, He is also being followed by Dr. Krysta Rod in [place].  He currently takes his acyclovir.  Last myeloma panel  showed increasing lambda light chains but marrow showed minimal plasma cells with PET CT no progression.  HIs lambda continues to increase supporting biochemical recurrence with worsening creatinine  He was started on Daratumumab, Revlimid 5 mg daily d1-21 and  Dexamethasone  40 mg weekly PO with great response and is nos most likley in CR. Patient was taking Revilimid every other day and it was held for pancytopenia he is now on Revlamid 2.5 mg every other day d1-21 every 28 days takes 10 tabs.\par \par 2.Chronic kidney disease from multiple myeloma.  He has not been on dialysis in the past but is no off.  The creatinine has been relatively stable.  He follows up with Dr. Allen.\par \par 3.Normocytic anemia, related to chronic kidney disease and Revlimid  . Procrit  60,000 units every  week and s/p IV iron will keep saturation over 20% and Ferritin over 100. Will increase Procrit if needed and or transfuse . His HGB has been improving\par \par \par 4. Moderate to severe   thrombocytopenia most likely from Revlimid. Platelets improved\par \par \par \par PLAN:\par -free light chains are  in the normal/expected range due to CKD. Although went up a bit.\par -Will proceed with  treatment  if remains in CR will proceed with Esthela maintenance after 10-12 months of treatment\par - revlimid 2.5mg every other day  21 days cycle of 28  and will decrease dexa to 20 mg weekly \par -myeloma labs as bellow\par -anemia on Procrit 60,000 units weekly. Responding will continue\par -once MM is active again will use Xgeva\par -RTC in one month\par \par

## 2018-02-26 NOTE — CONSULT LETTER
[Dear  ___] : Dear  [unfilled], [Referral Closing:] : Thank you very much for seeing this patient.  If you have any questions, please do not hesitate to contact me. [Sincerely,] : Sincerely, [FreeTextEntry3] : Andrew Pantoja [DrOrquidea  ___] : Dr. RECIO

## 2018-02-26 NOTE — RESULTS/DATA
[FreeTextEntry1] :  Result Name Results Units Reference Range \par      PET CT WHOLE BODY TOP OF SKULL TO TI   SEE TEXT       \par     Patient Name: MOR, AUGUST : 1950\par Patient ID: [id number]\par Account: [id number]\par Patient Location: [facility]\par Accession: [id number]\par Procedure: PET CT WHOLE BODY TOP OF SKULL TO TIP OF TOES SUBSEQUENT 250-0036\par Date of Exam: 2017 11:09 AM\par Attending Physician: JARVIS LOW\par Requesting Physician: JARVIS LOW MD\par Clinical History / Reason for exam: FDG PET CT STUDY:\par Reason for examination: Tumor imaging - PET with concurrently acquired CT for\par attenuation correction and anatomic localization;  top of the skull  to toes/\par [address]39[address] multiple myeloma, subsequent treatment strategy.\par ICD-10 code:C90.0\par History: 66-year-old male patient with history of multiple myeloma, last\par chemotherapy received was in 2017. Patient had bone biopsy on\par 2017.\par Blood glucose pre injection 102 mg/dL\par Technique:\par Approximately 45 minutes after the intravenous administration of 13.4  mCi\par 18-Fluorine FDG, whole body PET images were acquired from top of  the skull to\par toes. In addition, non-iv and non oral contrast, low dose, non - diagnostic CT\par was acquired for attenuation correction and anatomic correlation only.\par Findings:\par Comparison: Prior PET CT study done on July 3, 2015. Correlation was performed\par with the skeletal survey done on 2016.\par Head /Neck: Physiologic FDG uptake is seen in the visualized region of the\par brain, pharyngeal lymphoid tissue, and salivary glands.\par Chest:\par Physiologic FDG avid PET is seen in the mediastinal blood pool and myocardium.\par Chest wall: Unremarkable\par Lungs: No abnormal uptake.\par Mediastinum/Pleura/pericardium: No abnormal uptake.\par Thoracic/ axillary lymph nodes: No abnormal uptake.\par Hepatobiliary: Unremarkable.\par Gallbladder: Multiple gallstones.\par Spleen: No abnormal uptake\par Pancreas: No abnormal uptake.\par Adrenal glands: No abnormal uptake.\par Kidneys/ureters/bladder: Normal excretory activity is demonstrated.\par Abdominal pelvic lymph nodes: No abnormal uptake.\par Bowel/peritoneum/mesentery: No abnormal uptake.\par Pelvic organs: Unremarkable. No abnormal increased uptake. Prosthetic\par calcifications.\par Bones/soft tissues: Osteoarthritic changes are identified in the bones. \par Patient\par has multiple extensive lytic lesions in the skeletal, seen in the skeletal\par survey  for details see the report  on 2016.\par In the prior examination there was PET positive lytic lesion right fifth rib\par with a max SUV 2.6, now non-FDG avid   and L5 vertebral body on the left side\par with a max SUV 3.8, now non-FDG avid with multiple other scattered lytic\par lesions, non-FDG avid.\par No abnormal increased uptake is seen in the lower extremities.\par Impression:\par 1. No new areas of abnormal increased uptake is seen.\par 2. Previously seen abnormal increased uptake seen in the right fifth rib and \par L5\par vertebral body on the left side, at this time both regions are non-FDG avid.\par 3. Multiple extensive lytic lesions in the skeletal consistent with multiple\par myeloma.\par 4. No other areas of abnormal increased uptake is seen.\par I the attending attest that I have personally reviewed these images and agree\par with this report.\par \par  \par

## 2018-02-26 NOTE — HISTORY OF PRESENT ILLNESS
[de-identified] : REASON FOR FOLLOWUP:  Maintenance Velcade for lambda light chain myeloma, status post autologous stem cell transplant.\par \par TREATMENT HISTORY:  On 05/02/2015, he was diagnosed with lambda light chain multiple myeloma when he had a syncopal episode in [place] and was found to be in acute renal failure.  He underwent a bone marrow biopsy that confirmed lambda light chain multiple myeloma.  His beta2 microglobulin was initially 27.1 on diagnosis.  His lambda light chain prior to treatment was as high as 24688.2 on 02/27/2015.  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  There was also diffuse acute tubular injury and modular tubular atrophy with interstitial fibrosis initially on dialysis, but currently been off dialysis for approximately one year.\par \par His treatment summary is as follows.  He initiated Velcade, Cytoxan, and dexamethasone, cycle started on 03/13/2015.  He completed four cycles on 05/08/2015.  He then underwent an autologous bone marrow transplant in 07/2015 at [facility].  He has been on maintenance Velcade but in 6/17 was started on DRD due to  rising lambda with normal kappa.\par \par Mr. Motta is a very pleasant 66yearold gentleman with history of lambda light chain multiple myeloma, treated as above.\par  [Therapy: ___] : Therapy: [unfilled] [Cycle: ___] : Cycle: [unfilled] [FreeTextEntry1] : DRD started  on 6/21/17 [de-identified] : He presents today for followup she is currently on maintenance Velcade.his blood work from October 27 demonstrated a stage a faint lambda band on urine immunofixation. Nomi a count 7.66 hemoglobin 8.3 platelet count of 132. The SPEP was normal. Creatinine was stable at 5.93 potassium was 5.2 total protein 5.9 unremarkable LFTs free kappa lambda ratio normal 1.27 with free kappa at 42 and free lambda at 33.1. Most recent blood work from 1110 2016 shows a hemoglobin of 7.8. \par \par he was recently seen by Dr. Rod in  [place]. He had blood work done and reports that everything was stable. Dr. Rod also recommended to decrease his Velcade dose to 1.3 mg/m2\par \par He has no complaints. He denies any neuropathy.\par \par 1/4/17\par Presents for follow up today. Doing well on Maintenance Velcade.  No complaints. No neuropathy. \par \par 2/1/17\par He presents for follow up today. He has no complaints. Tolerating Velcade well.\par \par 4/5/17\par Doing well No complaints blood work from last visit was stable.\par \par 5/1/17\par He presents for follow up. He had increasing lambda light chains. Repeat was stable. A bone marrow showed minimal plasma cells and PET CT did not show any new activity.  He has no complaints.\par \par 6/5/2017\par Presents for follow up. He is doing well. He has some fatigue. His  Lambda has been slowly increasing. He has no other complaints.  No neuropathy\par \par 6/12/17\par He was brought back to discuss his lab work. His Lambda has been increasing with stable Kappa as well as slight worsening of his of creatinine He has no complaints.\par \par 7/12/17\par He has been on DRD. His HgB dropped to 5.9 and he is s/p PRBC transfusion.  He finished Revlimid on 7/9. His only complaint is leg crams at night. \par \par 8/9/17\par He is here for follow up. His cramps went away with the lower dose of Revlimid. His His lambda is coming down. He saw Dr. Rod last week as per patient.\par \par 8/30/27\par He is doing well. His legs cramps are better. He has severe anemia but he is asymptomatic. HIs creatinine is better and his light chain is at baseline.\par \par 9/27/17\par He is here for follow up. He needed  blood transfusion and hgb has been stable at 8 since than. He is also status post Venofer x2. He is on Procrit\par  40,000 units every 2 weeks. He feels well otherwise. \par \par 11/20/17\par Patient feels well and has no complaints. States he was taking Revlimid 5mg q48hrs prior to stopping for low counts. Patient will get Procrit and Darzalex. Patient will be restarted on Revilimid 2.5mg. Patient will continue every 2week Darzalex until week 25 then it becomes every 4 weeks. \par \par 12/18/17\par He is here for follow up. He is doing well. Blood work from last month still in CR. Cr and counts are better. He has no complaints and feel great.\par \par 1/22/18\par He is here for follow up. He is recovering from a cold. He is taking a prednisone taper given by his PMD. Had mules pain from cough.\par \par 2/26/18\par He is doing well. He has no complaints. His labs have been stable his light chain only went up slightly but ratio is normal.

## 2018-02-27 LAB
ALBUMIN SERPL ELPH-MCNC: 3.9 G/DL
ALP BLD-CCNC: 55 U/L
ALT SERPL-CCNC: 9 U/L
ANION GAP SERPL CALC-SCNC: 18 MMOL/L
AST SERPL-CCNC: 8 U/L
BILIRUB SERPL-MCNC: 0.2 MG/DL
BUN SERPL-MCNC: 63 MG/DL
CALCIUM SERPL-MCNC: 9.4 MG/DL
CALCIUM SERPL-MCNC: 9.6 MG/DL
CHLORIDE SERPL-SCNC: 103 MMOL/L
CHOLEST SERPL-MCNC: 215 MG/DL
CHOLEST/HDLC SERPL: 6 RATIO
CO2 SERPL-SCNC: 20 MMOL/L
CREAT SERPL-MCNC: 4.9 MG/DL
FERRITIN SERPL-MCNC: 239 NG/ML
GLUCOSE SERPL-MCNC: 79 MG/DL
HDLC SERPL-MCNC: 36 MG/DL
LDLC SERPL CALC-MCNC: 157 MG/DL
PARATHYROID HORMONE INTACT: 67 PG/ML
PHOSPHATE SERPL-MCNC: 3.8 MG/DL
POTASSIUM SERPL-SCNC: 4.7 MMOL/L
PROT SERPL-MCNC: 5.8 G/DL
SODIUM SERPL-SCNC: 141 MMOL/L
TRIGL SERPL-MCNC: 213 MG/DL
URATE SERPL-MCNC: 6.7 MG/DL

## 2018-02-28 ENCOUNTER — LABORATORY RESULT (OUTPATIENT)
Age: 68
End: 2018-02-28

## 2018-02-28 ENCOUNTER — APPOINTMENT (OUTPATIENT)
Dept: INFUSION THERAPY | Facility: CLINIC | Age: 68
End: 2018-02-28

## 2018-02-28 LAB
APPEARANCE: CLEAR
BILIRUBIN URINE: NEGATIVE
BLOOD URINE: NEGATIVE
COLOR: YELLOW
CREAT SPEC-SCNC: 57 MG/DL
CREAT/PROT UR: 0.1 RATIO
DEPRECATED KAPPA LC FREE/LAMBDA SER: 1.19 RATIO
GLUCOSE QUALITATIVE U: NEGATIVE MG/DL
HCT VFR BLD CALC: 33.2 %
HGB BLD-MCNC: 9.8 G/DL
KAPPA LC CSF-MCNC: 1.55 MG/DL
KAPPA LC SERPL-MCNC: 1.84 MG/DL
KETONES URINE: NEGATIVE
LEUKOCYTE ESTERASE URINE: NEGATIVE
MCHC RBC-ENTMCNC: 27.5 PG
MCHC RBC-ENTMCNC: 29.5 G/DL
MCV RBC AUTO: 93 FL
NITRITE URINE: NEGATIVE
PH URINE: 6
PLATELET # BLD AUTO: 140 K/UL
PMV BLD: NORMAL
PROT UR-MCNC: 8 MG/DL
PROTEIN URINE: NEGATIVE MG/DL
RBC # BLD: 3.57 M/UL
RBC # FLD: 22.5 %
SPECIFIC GRAVITY URINE: 1.01
UROBILINOGEN URINE: NEGATIVE MG/DL
WBC # FLD AUTO: 6.17 K/UL

## 2018-02-28 RX ORDER — ERYTHROPOIETIN 10000 [IU]/ML
60000 INJECTION, SOLUTION INTRAVENOUS; SUBCUTANEOUS ONCE
Qty: 0 | Refills: 0 | Status: COMPLETED | OUTPATIENT
Start: 2018-02-28 | End: 2018-02-28

## 2018-02-28 RX ORDER — DARATUMUMAB 100 MG/5ML
1155 INJECTION, SOLUTION, CONCENTRATE INTRAVENOUS ONCE
Qty: 0 | Refills: 0 | Status: COMPLETED | OUTPATIENT
Start: 2018-02-28 | End: 2018-02-28

## 2018-02-28 RX ORDER — ACETAMINOPHEN 500 MG
650 TABLET ORAL ONCE
Qty: 0 | Refills: 0 | Status: COMPLETED | OUTPATIENT
Start: 2018-02-28 | End: 2018-02-28

## 2018-02-28 RX ORDER — DIPHENHYDRAMINE HCL 50 MG
50 CAPSULE ORAL ONCE
Qty: 0 | Refills: 0 | Status: COMPLETED | OUTPATIENT
Start: 2018-02-28 | End: 2018-02-28

## 2018-02-28 RX ADMIN — Medication 153 MILLIGRAM(S): at 10:29

## 2018-02-28 RX ADMIN — Medication 103.2 MILLIGRAM(S): at 10:29

## 2018-02-28 RX ADMIN — Medication 650 MILLIGRAM(S): at 11:05

## 2018-02-28 RX ADMIN — ERYTHROPOIETIN 60000 UNIT(S): 10000 INJECTION, SOLUTION INTRAVENOUS; SUBCUTANEOUS at 10:54

## 2018-02-28 RX ADMIN — Medication 650 MILLIGRAM(S): at 10:29

## 2018-02-28 RX ADMIN — DARATUMUMAB 185.92 MILLIGRAM(S): 100 INJECTION, SOLUTION, CONCENTRATE INTRAVENOUS at 10:54

## 2018-03-07 ENCOUNTER — LABORATORY RESULT (OUTPATIENT)
Age: 68
End: 2018-03-07

## 2018-03-07 ENCOUNTER — APPOINTMENT (OUTPATIENT)
Dept: INFUSION THERAPY | Facility: CLINIC | Age: 68
End: 2018-03-07

## 2018-03-07 LAB
HCT VFR BLD CALC: 36.6 %
HGB BLD-MCNC: 10.7 G/DL
MCHC RBC-ENTMCNC: 27.2 PG
MCHC RBC-ENTMCNC: 29.2 G/DL
MCV RBC AUTO: 92.9 FL
PLATELET # BLD AUTO: 98 K/UL
PMV BLD: NORMAL
RBC # BLD: 3.94 M/UL
RBC # FLD: 22.4 %
WBC # FLD AUTO: 9.81 K/UL

## 2018-03-07 RX ORDER — ERYTHROPOIETIN 10000 [IU]/ML
60000 INJECTION, SOLUTION INTRAVENOUS; SUBCUTANEOUS ONCE
Qty: 0 | Refills: 0 | Status: COMPLETED | OUTPATIENT
Start: 2018-03-07 | End: 2018-03-07

## 2018-03-07 RX ADMIN — ERYTHROPOIETIN 60000 UNIT(S): 10000 INJECTION, SOLUTION INTRAVENOUS; SUBCUTANEOUS at 09:00

## 2018-03-15 ENCOUNTER — LABORATORY RESULT (OUTPATIENT)
Age: 68
End: 2018-03-15

## 2018-03-15 ENCOUNTER — APPOINTMENT (OUTPATIENT)
Dept: INFUSION THERAPY | Facility: CLINIC | Age: 68
End: 2018-03-15

## 2018-03-15 ENCOUNTER — OUTPATIENT (OUTPATIENT)
Dept: OUTPATIENT SERVICES | Facility: HOSPITAL | Age: 68
LOS: 1 days | Discharge: HOME | End: 2018-03-15

## 2018-03-15 DIAGNOSIS — D63.1 ANEMIA IN CHRONIC KIDNEY DISEASE: ICD-10-CM

## 2018-03-15 DIAGNOSIS — C90.01 MULTIPLE MYELOMA IN REMISSION: ICD-10-CM

## 2018-03-15 DIAGNOSIS — N18.9 CHRONIC KIDNEY DISEASE, UNSPECIFIED: ICD-10-CM

## 2018-03-15 DIAGNOSIS — C90.02 MULTIPLE MYELOMA IN RELAPSE: ICD-10-CM

## 2018-03-15 LAB
HCT VFR BLD CALC: 36.4 %
HGB BLD-MCNC: 10.7 G/DL
MCHC RBC-ENTMCNC: 27.2 PG
MCHC RBC-ENTMCNC: 29.4 G/DL
MCV RBC AUTO: 92.4 FL
PLATELET # BLD AUTO: 107 K/UL
PMV BLD: NORMAL
RBC # BLD: 3.94 M/UL
RBC # FLD: 22.8 %
WBC # FLD AUTO: 6.34 K/UL

## 2018-03-15 RX ORDER — ERYTHROPOIETIN 10000 [IU]/ML
60000 INJECTION, SOLUTION INTRAVENOUS; SUBCUTANEOUS ONCE
Qty: 0 | Refills: 0 | Status: COMPLETED | OUTPATIENT
Start: 2018-03-15 | End: 2018-03-15

## 2018-03-15 RX ADMIN — ERYTHROPOIETIN 60000 UNIT(S): 10000 INJECTION, SOLUTION INTRAVENOUS; SUBCUTANEOUS at 08:53

## 2018-03-21 ENCOUNTER — APPOINTMENT (OUTPATIENT)
Dept: INFUSION THERAPY | Facility: CLINIC | Age: 68
End: 2018-03-21

## 2018-03-21 ENCOUNTER — APPOINTMENT (OUTPATIENT)
Dept: HEMATOLOGY ONCOLOGY | Facility: CLINIC | Age: 68
End: 2018-03-21

## 2018-03-27 ENCOUNTER — APPOINTMENT (OUTPATIENT)
Dept: HEMATOLOGY ONCOLOGY | Facility: CLINIC | Age: 68
End: 2018-03-27

## 2018-03-27 VITALS
HEIGHT: 66 IN | TEMPERATURE: 97.4 F | WEIGHT: 154 LBS | BODY MASS INDEX: 24.75 KG/M2 | SYSTOLIC BLOOD PRESSURE: 158 MMHG | HEART RATE: 83 BPM | RESPIRATION RATE: 17 BRPM | DIASTOLIC BLOOD PRESSURE: 87 MMHG

## 2018-03-27 NOTE — HISTORY OF PRESENT ILLNESS
[de-identified] : REASON FOR FOLLOWUP:  Maintenance Velcade for lambda light chain myeloma, status post autologous stem cell transplant.\par \par TREATMENT HISTORY:  On 05/02/2015, he was diagnosed with lambda light chain multiple myeloma when he had a syncopal episode in [place] and was found to be in acute renal failure.  He underwent a bone marrow biopsy that confirmed lambda light chain multiple myeloma.  His beta2 microglobulin was initially 27.1 on diagnosis.  His lambda light chain prior to treatment was as high as 54311.2 on 02/27/2015.  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  There was also diffuse acute tubular injury and modular tubular atrophy with interstitial fibrosis initially on dialysis, but currently been off dialysis for approximately one year.\par \par His treatment summary is as follows.  He initiated Velcade, Cytoxan, and dexamethasone, cycle started on 03/13/2015.  He completed four cycles on 05/08/2015.  He then underwent an autologous bone marrow transplant in 07/2015 at [place].  He has been on maintenance Velcade but in 6/17 was started on DRD due to  rising lambda with normal kappa.\par \par Mr. Motta is a very pleasant 66yearold gentleman with history of lambda light chain multiple myeloma, treated as above.\par  [Therapy: ___] : Therapy: [unfilled] [Cycle: ___] : Cycle: [unfilled] [FreeTextEntry1] : DRD started  on 6/21/17 [de-identified] : He presents today for followup she is currently on maintenance Velcade.his blood work from October 27 demonstrated a stage a faint lambda band on urine immunofixation. Nomi a count 7.66 hemoglobin 8.3 platelet count of 132. The SPEP was normal. Creatinine was stable at 5.93 potassium was 5.2 total protein 5.9 unremarkable LFTs free kappa lambda ratio normal 1.27 with free kappa at 42 and free lambda at 33.1. Most recent blood work from 1110 2016 shows a hemoglobin of 7.8. \par \par he was recently seen by Dr. Rod in  [place]. He had blood work done and reports that everything was stable. Dr. Rod also recommended to decrease his Velcade dose to 1.3 mg/m2\par \par He has no complaints. He denies any neuropathy.\par \par 1/4/17\par Presents for follow up today. Doing well on Maintenance Velcade.  No complaints. No neuropathy. \par \par 2/1/17\par He presents for follow up today. He has no complaints. Tolerating Velcade well.\par \par 4/5/17\par Doing well No complaints blood work from last visit was stable.\par \par 5/1/17\par He presents for follow up. He had increasing lambda light chains. Repeat was stable. A bone marrow showed minimal plasma cells and PET CT did not show any new activity.  He has no complaints.\par \par 6/5/2017\par Presents for follow up. He is doing well. He has some fatigue. His  Lambda has been slowly increasing. He has no other complaints.  No neuropathy\par \par 6/12/17\par He was brought back to discuss his lab work. His Lambda has been increasing with stable Kappa as well as slight worsening of his of creatinine He has no complaints.\par \par 7/12/17\par He has been on DRD. His HgB dropped to 5.9 and he is s/p PRBC transfusion.  He finished Revlimid on 7/9. His only complaint is leg crams at night. \par \par 8/9/17\par He is here for follow up. His cramps went away with the lower dose of Revlimid. His His lambda is coming down. He saw Dr. Rod last week as per patient.\par \par 8/30/27\par He is doing well. His legs cramps are better. He has severe anemia but he is asymptomatic. HIs creatinine is better and his light chain is at baseline.\par \par 9/27/17\par He is here for follow up. He needed  blood transfusion and hgb has been stable at 8 since than. He is also status post Venofer x2. He is on Procrit\par  40,000 units every 2 weeks. He feels well otherwise. \par \par 11/20/17\par Patient feels well and has no complaints. States he was taking Revlimid 5mg q48hrs prior to stopping for low counts. Patient will get Procrit and Darzalex. Patient will be restarted on Revilimid 2.5mg. Patient will continue every 2week Darzalex until week 25 then it becomes every 4 weeks. \par \par 12/18/17\par He is here for follow up. He is doing well. Blood work from last month still in CR. Cr and counts are better. He has no complaints and feel great.\par \par 1/22/18\par He is here for follow up. He is recovering from a cold. He is taking a prednisone taper given by his PMD. Had mules pain from cough.\par \par 2/26/18\par He is doing well. He has no complaints. His labs have been stable his light chain only went up slightly but ratio is normal. \par \par 3/27/18\par He is here for follow up he is doing well. He had blood work in [facility]  where he saw Dr. Rod 3/26/18 WBC 6.7, HgB 11.3, Pts 140, LFT WNL, , Ca 9.7. Cr. 5.96, alb 3.2\par \par He feels well.

## 2018-03-27 NOTE — RESULTS/DATA
[FreeTextEntry1] :  Result Name Results Units Reference Range \par      PET CT WHOLE BODY TOP OF SKULL TO TI   SEE TEXT       \par     Patient Name: MOR, AUGUST : 1950\par Patient ID: [id number]\par Account: [id number]\par Patient Location: [facility]\par Accession: [id number]\par Procedure: PET CT WHOLE BODY TOP OF SKULL TO TIP OF TOES SUBSEQUENT 250-0036\par Date of Exam: 2017 11:09 AM\par Attending Physician: JARVIS LOW\par Requesting Physician: JARVIS LOW MD\par Clinical History / Reason for exam: FDG PET CT STUDY:\par Reason for examination: Tumor imaging - PET with concurrently acquired CT for\par attenuation correction and anatomic localization;  top of the skull  to toes/\par 63505 multiple myeloma, subsequent treatment strategy.\par ICD-10 code:C90.0\par History: 66-year-old male patient with history of multiple myeloma, last\par chemotherapy received was in 2017. Patient had bone biopsy on\par 2017.\par Blood glucose pre injection 102 mg/dL\par Technique:\par Approximately 45 minutes after the intravenous administration of 13.4  mCi\par 18-Fluorine FDG, whole body PET images were acquired from top of  the skull to\par toes. In addition, non-iv and non oral contrast, low dose, non - diagnostic CT\par was acquired for attenuation correction and anatomic correlation only.\par Findings:\par Comparison: Prior PET CT study done on July 3, 2015. Correlation was performed\par with the skeletal survey done on 2016.\par Head /Neck: Physiologic FDG uptake is seen in the visualized region of the\par brain, pharyngeal lymphoid tissue, and salivary glands.\par Chest:\par Physiologic FDG avid PET is seen in the mediastinal blood pool and myocardium.\par Chest wall: Unremarkable\par Lungs: No abnormal uptake.\par Mediastinum/Pleura/pericardium: No abnormal uptake.\par Thoracic/ axillary lymph nodes: No abnormal uptake.\par Hepatobiliary: Unremarkable.\par Gallbladder: Multiple gallstones.\par Spleen: No abnormal uptake\par Pancreas: No abnormal uptake.\par Adrenal glands: No abnormal uptake.\par Kidneys/ureters/bladder: Normal excretory activity is demonstrated.\par Abdominal pelvic lymph nodes: No abnormal uptake.\par Bowel/peritoneum/mesentery: No abnormal uptake.\par Pelvic organs: Unremarkable. No abnormal increased uptake. Prosthetic\par calcifications.\par Bones/soft tissues: Osteoarthritic changes are identified in the bones. \par Patient\par has multiple extensive lytic lesions in the skeletal, seen in the skeletal\par survey  for details see the report  on 2016.\par In the prior examination there was PET positive lytic lesion right fifth rib\par with a max SUV 2.6, now non-FDG avid   and L5 vertebral body on the left side\par with a max SUV 3.8, now non-FDG avid with multiple other scattered lytic\par lesions, non-FDG avid.\par No abnormal increased uptake is seen in the lower extremities.\par Impression:\par 1. No new areas of abnormal increased uptake is seen.\par 2. Previously seen abnormal increased uptake seen in the right fifth rib and \par L5\par vertebral body on the left side, at this time both regions are non-FDG avid.\par 3. Multiple extensive lytic lesions in the skeletal consistent with multiple\par myeloma.\par 4. No other areas of abnormal increased uptake is seen.\par I the attending attest that I have personally reviewed these images and agree\par with this report.\par \par  \par

## 2018-03-27 NOTE — ASSESSMENT
[FreeTextEntry1] : 1.Free light chain multiple myeloma, specifically lambda.  Initial beta-2 was 27.1.  His bone marrow cytogenetics was normal.  He is status post four cycles of VCD.  This is followed by autologous bone marrow transplant in 07/2015,he has been on  weekly Velcade maintenance, He is also being followed by Dr. Krysta Rod in [place].  He currently takes his acyclovir.  Last myeloma panel  showed increasing lambda light chains but marrow showed minimal plasma cells with PET CT no progression.  HIs lambda continues to increase supporting biochemical recurrence with worsening creatinine  He was started on Daratumumab, Revlimid 5 mg daily d1-21 and  Dexamethasone  40 mg weekly PO with great response and is nos most likley in CR. Patient was taking Revilimid every other day and it was held for pancytopenia he is now on Revlamid 2.5 mg every other day d1-21 every 28 days takes 10 tabs.\par \par 2.Chronic kidney disease from multiple myeloma.  He has not been on dialysis in the past but is no off.  The creatinine has been relatively stable.  He follows up with Dr. Allen.\par \par 3.Normocytic anemia, related to chronic kidney disease and Revlimid  . Procrit  60,000 units every  week and s/p IV iron will keep saturation over 20% and Ferritin over 100. Will increase Procrit if needed and or transfuse . His HGB has been improving\par \par \par 4. Moderate to severe   thrombocytopenia most likely from Revlimid. Platelets improved\par \par \par \par PLAN:\par -free light chains are  in the normal/expected range due to CKD. \par -Will proceed with  treatment  if remains in CR will proceed with Revlimid maintenance at 12 months around 6/2018\par - revlimid 2.5mg every other day  21 days cycle of 28  and will dexa to 20 mg weekly \par -myeloma labs as bellow\par -anemia HgB was over 11 [place] stop  \par -once MM is active again will use Xgeva\par -RTC in one month\par -he needs post Transplant vaccinations will arrange\par \par

## 2018-03-28 ENCOUNTER — APPOINTMENT (OUTPATIENT)
Dept: INFUSION THERAPY | Facility: CLINIC | Age: 68
End: 2018-03-28

## 2018-03-28 RX ORDER — DIPHENHYDRAMINE HCL 50 MG
50 CAPSULE ORAL ONCE
Qty: 0 | Refills: 0 | Status: COMPLETED | OUTPATIENT
Start: 2018-03-28 | End: 2018-03-28

## 2018-03-28 RX ORDER — ACETAMINOPHEN 500 MG
650 TABLET ORAL ONCE
Qty: 0 | Refills: 0 | Status: COMPLETED | OUTPATIENT
Start: 2018-03-28 | End: 2018-03-28

## 2018-03-28 RX ORDER — DARATUMUMAB 100 MG/5ML
1185 INJECTION, SOLUTION, CONCENTRATE INTRAVENOUS ONCE
Qty: 0 | Refills: 0 | Status: COMPLETED | OUTPATIENT
Start: 2018-03-28 | End: 2018-03-28

## 2018-03-28 RX ADMIN — Medication 153 MILLIGRAM(S): at 10:27

## 2018-03-28 RX ADMIN — Medication 154.8 MILLIGRAM(S): at 10:27

## 2018-03-28 RX ADMIN — DARATUMUMAB 186.42 MILLIGRAM(S): 100 INJECTION, SOLUTION, CONCENTRATE INTRAVENOUS at 10:46

## 2018-03-28 RX ADMIN — Medication 650 MILLIGRAM(S): at 10:48

## 2018-03-28 RX ADMIN — Medication 650 MILLIGRAM(S): at 10:26

## 2018-03-29 LAB
ALBUMIN SERPL ELPH-MCNC: 3.9 G/DL
ALP BLD-CCNC: 59 U/L
ALT SERPL-CCNC: 6 U/L
ANION GAP SERPL CALC-SCNC: 21 MMOL/L
AST SERPL-CCNC: 10 U/L
BILIRUB SERPL-MCNC: <0.2 MG/DL
BUN SERPL-MCNC: 61 MG/DL
CALCIUM SERPL-MCNC: 9.1 MG/DL
CHLORIDE SERPL-SCNC: 99 MMOL/L
CO2 SERPL-SCNC: 18 MMOL/L
CREAT SERPL-MCNC: 5.4 MG/DL
GLUCOSE SERPL-MCNC: 99 MG/DL
POTASSIUM SERPL-SCNC: 4.7 MMOL/L
PROT SERPL-MCNC: 5.9 G/DL
SODIUM SERPL-SCNC: 138 MMOL/L

## 2018-03-30 LAB
DEPRECATED KAPPA LC FREE/LAMBDA SER: 1.11 RATIO
KAPPA LC CSF-MCNC: 1.89 MG/DL
KAPPA LC SERPL-MCNC: 2.09 MG/DL

## 2018-04-04 ENCOUNTER — APPOINTMENT (OUTPATIENT)
Dept: INFUSION THERAPY | Facility: CLINIC | Age: 68
End: 2018-04-04

## 2018-04-05 ENCOUNTER — MOBILE ON CALL (OUTPATIENT)
Age: 68
End: 2018-04-05

## 2018-04-11 ENCOUNTER — RX RENEWAL (OUTPATIENT)
Age: 68
End: 2018-04-11

## 2018-04-11 ENCOUNTER — APPOINTMENT (OUTPATIENT)
Dept: INFUSION THERAPY | Facility: CLINIC | Age: 68
End: 2018-04-11

## 2018-04-18 ENCOUNTER — APPOINTMENT (OUTPATIENT)
Dept: INFUSION THERAPY | Facility: CLINIC | Age: 68
End: 2018-04-18

## 2018-04-23 ENCOUNTER — APPOINTMENT (OUTPATIENT)
Dept: HEMATOLOGY ONCOLOGY | Facility: CLINIC | Age: 68
End: 2018-04-23

## 2018-04-23 ENCOUNTER — LABORATORY RESULT (OUTPATIENT)
Age: 68
End: 2018-04-23

## 2018-04-23 VITALS
BODY MASS INDEX: 26.03 KG/M2 | DIASTOLIC BLOOD PRESSURE: 82 MMHG | SYSTOLIC BLOOD PRESSURE: 156 MMHG | WEIGHT: 162 LBS | RESPIRATION RATE: 16 BRPM | TEMPERATURE: 96.6 F | HEART RATE: 78 BPM | HEIGHT: 66 IN

## 2018-04-23 LAB
ALBUMIN SERPL ELPH-MCNC: 3.9 G/DL
ALP BLD-CCNC: 51 U/L
ALT SERPL-CCNC: 7 U/L
ANION GAP SERPL CALC-SCNC: 19 MMOL/L
AST SERPL-CCNC: 7 U/L
BILIRUB SERPL-MCNC: <0.2 MG/DL
BUN SERPL-MCNC: 64 MG/DL
CALCIUM SERPL-MCNC: 9.6 MG/DL
CHLORIDE SERPL-SCNC: 103 MMOL/L
CO2 SERPL-SCNC: 22 MMOL/L
CREAT SERPL-MCNC: 5.7 MG/DL
GLUCOSE SERPL-MCNC: 102 MG/DL
HCT VFR BLD CALC: 34.3 %
HGB BLD-MCNC: 9.9 G/DL
MCHC RBC-ENTMCNC: 25.3 PG
MCHC RBC-ENTMCNC: 28.9 G/DL
MCV RBC AUTO: 87.7 FL
PLATELET # BLD AUTO: 170 K/UL
PMV BLD: 10.1 FL
POTASSIUM SERPL-SCNC: 5 MMOL/L
PROT SERPL-MCNC: 5.9 G/DL
RBC # BLD: 3.91 M/UL
RBC # FLD: 22.2 %
SODIUM SERPL-SCNC: 144 MMOL/L
WBC # FLD AUTO: 8.57 K/UL

## 2018-04-23 NOTE — REVIEW OF SYSTEMS
[Fatigue] : no fatigue [Leg Claudication] : no intermittent leg claudication [Lower Ext Edema] : no lower extremity edema [SOB on Exertion] : no shortness of breath during exertion [Muscle Pain] : no muscle pain [Negative] : Neurological

## 2018-04-23 NOTE — HISTORY OF PRESENT ILLNESS
[de-identified] : REASON FOR FOLLOWUP:  Maintenance Velcade for lambda light chain myeloma, status post autologous stem cell transplant.\par \par TREATMENT HISTORY:  On 05/02/2015, he was diagnosed with lambda light chain multiple myeloma when he had a syncopal episode in [place] and was found to be in acute renal failure.  He underwent a bone marrow biopsy that confirmed lambda light chain multiple myeloma.  His beta2 microglobulin was initially 27.1 on diagnosis.  His lambda light chain prior to treatment was as high as 89199.2 on 02/27/2015.  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  There was also diffuse acute tubular injury and modular tubular atrophy with interstitial fibrosis initially on dialysis, but currently been off dialysis for approximately one year.\par \par His treatment summary is as follows.  He initiated Velcade, Cytoxan, and dexamethasone, cycle started on 03/13/2015.  He completed four cycles on 05/08/2015.  He then underwent an autologous bone marrow transplant in 07/2015 at [place].  He has been on maintenance Velcade but in 6/17 was started on DRD due to  rising lambda with normal kappa.\par \par Mr. Motta is a very pleasant 66yearold gentleman with history of lambda light chain multiple myeloma, treated as above.\par  [Therapy: ___] : Therapy: [unfilled] [Cycle: ___] : Cycle: [unfilled] [FreeTextEntry1] : DRD started  on 6/21/17 [de-identified] : He presents today for followup she is currently on maintenance Velcade.his blood work from October 27 demonstrated a stage a faint lambda band on urine immunofixation. Nomi a count 7.66 hemoglobin 8.3 platelet count of 132. The SPEP was normal. Creatinine was stable at 5.93 potassium was 5.2 total protein 5.9 unremarkable LFTs free kappa lambda ratio normal 1.27 with free kappa at 42 and free lambda at 33.1. Most recent blood work from 1110 2016 shows a hemoglobin of 7.8. \par \par he was recently seen by Dr. Rod in  [place]. He had blood work done and reports that everything was stable. Dr. Rod also recommended to decrease his Velcade dose to 1.3 mg/m2\par \par He has no complaints. He denies any neuropathy.\par \par 1/4/17\par Presents for follow up today. Doing well on Maintenance Velcade.  No complaints. No neuropathy. \par \par 2/1/17\par He presents for follow up today. He has no complaints. Tolerating Velcade well.\par \par 4/5/17\par Doing well No complaints blood work from last visit was stable.\par \par 5/1/17\par He presents for follow up. He had increasing lambda light chains. Repeat was stable. A bone marrow showed minimal plasma cells and PET CT did not show any new activity.  He has no complaints.\par \par 6/5/2017\par Presents for follow up. He is doing well. He has some fatigue. His  Lambda has been slowly increasing. He has no other complaints.  No neuropathy\par \par 6/12/17\par He was brought back to discuss his lab work. His Lambda has been increasing with stable Kappa as well as slight worsening of his of creatinine He has no complaints.\par \par 7/12/17\par He has been on DRD. His HgB dropped to 5.9 and he is s/p PRBC transfusion.  He finished Revlimid on 7/9. His only complaint is leg crams at night. \par \par 8/9/17\par He is here for follow up. His cramps went away with the lower dose of Revlimid. His His lambda is coming down. He saw Dr. Rod last week as per patient.\par \par 8/30/27\par He is doing well. His legs cramps are better. He has severe anemia but he is asymptomatic. HIs creatinine is better and his light chain is at baseline.\par \par 9/27/17\par He is here for follow up. He needed  blood transfusion and hgb has been stable at 8 since than. He is also status post Venofer x2. He is on Procrit\par  40,000 units every 2 weeks. He feels well otherwise. \par \par 11/20/17\par Patient feels well and has no complaints. States he was taking Revlimid 5mg q48hrs prior to stopping for low counts. Patient will get Procrit and Darzalex. Patient will be restarted on Revilimid 2.5mg. Patient will continue every 2week Darzalex until week 25 then it becomes every 4 weeks. \par \par 12/18/17\par He is here for follow up. He is doing well. Blood work from last month still in CR. Cr and counts are better. He has no complaints and feel great.\par \par 1/22/18\par He is here for follow up. He is recovering from a cold. He is taking a prednisone taper given by his PMD. Had mules pain from cough.\par \par 2/26/18\par He is doing well. He has no complaints. His labs have been stable his light chain only went up slightly but ratio is normal. \par \par 3/27/18\par He is here for follow up he is doing well. He had blood work in [facility]  where he saw Dr. Rod 3/26/18 WBC 6.7, HgB 11.3, Pts 140, LFT WNL, , Ca 9.7. Cr. 5.96, alb 3.2\par \par He feels well. \par \par 4/23/18\par No events feels well. No complaints

## 2018-04-23 NOTE — ASSESSMENT
[FreeTextEntry1] : 1.Free light chain multiple myeloma, specifically lambda.  Initial beta-2 was 27.1.  His bone marrow cytogenetics was normal.  He is status post four cycles of VCD.  This is followed by autologous bone marrow transplant in 07/2015,he has been on  weekly Velcade maintenance, He is also being followed by Dr. Krysta Rod in [place].  He currently takes his acyclovir.  Last myeloma panel  showed increasing lambda light chains but marrow showed minimal plasma cells with PET CT no progression.  HIs lambda continues to increase supporting biochemical recurrence with worsening creatinine  He was started on Daratumumab, Revlimid 5 mg daily d1-21 and  Dexamethasone  40 mg weekly PO with great response and is nos most likley in CR. Patient was taking Revilimid every other day and it was held for pancytopenia he is now on Revlamid 2.5 mg every other day d1-21 every 28 days takes 10 tabs.\par \par 2.Chronic kidney disease from multiple myeloma.  He has not been on dialysis in the past but is no off.  The creatinine has been relatively stable.  He follows up with Dr. Allen.\par \par 3.Normocytic anemia, related to chronic kidney disease and Revlimid  . Procrit  60,000 units every  week and s/p IV iron will keep saturation over 20% and Ferritin over 100. Will increase Procrit if needed and or transfuse . His HGB has been improving\par \par \par 4. Moderate to severe   thrombocytopenia most likely from Revlimid. Platelets improved\par \par \par \par PLAN:\par -free light chains are  in the normal/expected range due to CKD. \par -Will proceed with  treatment  if remains in CR will proceed with Revlimid maintenance at 12 months around 6/2018\par - revlimid 2.5mg every other day  21 days cycle of 28  and wdexa to 20 mg weekly \par -myeloma labs as bellow\par -anemia HgB was over 11 Procrit on hold if falls under 10 again will retstart\par -once MM is active again will use Xgeva\par -RTC in one month\par -he needs post Transplant vaccinations will arrange In august\par \par

## 2018-04-23 NOTE — RESULTS/DATA
[FreeTextEntry1] :  Result Name Results Units Reference Range \par      PET CT WHOLE BODY TOP OF SKULL TO TI   SEE TEXT       \par     Patient Name: MOR, AUGUST : 1950\par Patient ID: [id number]\par Account: [id number]\par Patient Location: [facility]\par Accession: [id number]\par Procedure: PET CT WHOLE BODY TOP OF SKULL TO TIP OF TOES SUBSEQUENT 250-0036\par Date of Exam: 2017 11:09 AM\par Attending Physician: JARVIS LOW\par Requesting Physician: JARVIS LOW MD\par Clinical History / Reason for exam: FDG PET CT STUDY:\par Reason for examination: Tumor imaging - PET with concurrently acquired CT for\par attenuation correction and anatomic localization;  top of the skull  to toes/\par 70[address] multiple myeloma, subsequent treatment strategy.\par ICD-10 code:C90.0\par History: 66-year-old male patient with history of multiple myeloma, last\par chemotherapy received was in 2017. Patient had bone biopsy on\par 2017.\par Blood glucose pre injection 102 mg/dL\par Technique:\par Approximately 45 minutes after the intravenous administration of 13.4  mCi\par 18-Fluorine FDG, whole body PET images were acquired from top of  the skull to\par toes. In addition, non-iv and non oral contrast, low dose, non - diagnostic CT\par was acquired for attenuation correction and anatomic correlation only.\par Findings:\par Comparison: Prior PET CT study done on July 3, 2015. Correlation was performed\par with the skeletal survey done on 2016.\par Head /Neck: Physiologic FDG uptake is seen in the visualized region of the\par brain, pharyngeal lymphoid tissue, and salivary glands.\par Chest:\par Physiologic FDG avid PET is seen in the mediastinal blood pool and myocardium.\par Chest wall: Unremarkable\par Lungs: No abnormal uptake.\par Mediastinum/Pleura/pericardium: No abnormal uptake.\par Thoracic/ axillary lymph nodes: No abnormal uptake.\par Hepatobiliary: Unremarkable.\par Gallbladder: Multiple gallstones.\par Spleen: No abnormal uptake\par Pancreas: No abnormal uptake.\par Adrenal glands: No abnormal uptake.\par Kidneys/ureters/bladder: Normal excretory activity is demonstrated.\par Abdominal pelvic lymph nodes: No abnormal uptake.\par Bowel/peritoneum/mesentery: No abnormal uptake.\par Pelvic organs: Unremarkable. No abnormal increased uptake. Prosthetic\par calcifications.\par Bones/soft tissues: Osteoarthritic changes are identified in the bones. \par Patient\par has multiple extensive lytic lesions in the skeletal, seen in the skeletal\par survey  for details see the report  on 2016.\par In the prior examination there was PET positive lytic lesion right fifth rib\par with a max SUV 2.6, now non-FDG avid   and L5 vertebral body on the left side\par with a max SUV 3.8, now non-FDG avid with multiple other scattered lytic\par lesions, non-FDG avid.\par No abnormal increased uptake is seen in the lower extremities.\par Impression:\par 1. No new areas of abnormal increased uptake is seen.\par 2. Previously seen abnormal increased uptake seen in the right fifth rib and \par L5\par vertebral body on the left side, at this time both regions are non-FDG avid.\par 3. Multiple extensive lytic lesions in the skeletal consistent with multiple\par myeloma.\par 4. No other areas of abnormal increased uptake is seen.\par I the attending attest that I have personally reviewed these images and agree\par with this report.\par \par  \par

## 2018-04-24 LAB
ALBUMIN MFR SERPL ELPH: 63.7 %
ALBUMIN SERPL-MCNC: 3.8 G/DL
ALBUMIN/GLOB SERPL: 1.8 RATIO
ALPHA1 GLOB MFR SERPL ELPH: 6.1 %
ALPHA1 GLOB SERPL ELPH-MCNC: 0.4 G/DL
ALPHA2 GLOB MFR SERPL ELPH: 14.1 %
ALPHA2 GLOB SERPL ELPH-MCNC: 0.8 G/DL
B-GLOBULIN MFR SERPL ELPH: 9.7 %
B-GLOBULIN SERPL ELPH-MCNC: 0.6 G/DL
DEPRECATED KAPPA LC FREE/LAMBDA SER: 0.99 RATIO
GAMMA GLOB FLD ELPH-MCNC: 0.4 G/DL
GAMMA GLOB MFR SERPL ELPH: 6.4 %
INTERPRETATION SERPL IEP-IMP: NORMAL
KAPPA LC CSF-MCNC: 1.58 MG/DL
KAPPA LC SERPL-MCNC: 1.57 MG/DL
M PROTEIN MFR SERPL ELPH: NORMAL %
M PROTEIN SPEC IFE-MCNC: NORMAL
MONOCLON BAND OBS SERPL: NORMAL G/DL
PROT SERPL-MCNC: 5.9 G/DL
PROT SERPL-MCNC: 5.9 G/DL

## 2018-04-25 ENCOUNTER — APPOINTMENT (OUTPATIENT)
Dept: INFUSION THERAPY | Facility: CLINIC | Age: 68
End: 2018-04-25

## 2018-04-25 RX ORDER — DIPHENHYDRAMINE HCL 50 MG
50 CAPSULE ORAL ONCE
Qty: 0 | Refills: 0 | Status: COMPLETED | OUTPATIENT
Start: 2018-04-25 | End: 2018-04-25

## 2018-04-25 RX ORDER — DARATUMUMAB 100 MG/5ML
1185 INJECTION, SOLUTION, CONCENTRATE INTRAVENOUS ONCE
Qty: 0 | Refills: 0 | Status: COMPLETED | OUTPATIENT
Start: 2018-04-25 | End: 2018-04-25

## 2018-04-25 RX ORDER — ACETAMINOPHEN 500 MG
650 TABLET ORAL ONCE
Qty: 0 | Refills: 0 | Status: COMPLETED | OUTPATIENT
Start: 2018-04-25 | End: 2018-04-25

## 2018-04-25 RX ADMIN — Medication 650 MILLIGRAM(S): at 09:53

## 2018-04-25 RX ADMIN — DARATUMUMAB 186.42 MILLIGRAM(S): 100 INJECTION, SOLUTION, CONCENTRATE INTRAVENOUS at 10:45

## 2018-04-25 RX ADMIN — Medication 103.2 MILLIGRAM(S): at 09:52

## 2018-04-25 RX ADMIN — Medication 153 MILLIGRAM(S): at 09:52

## 2018-04-25 RX ADMIN — Medication 650 MILLIGRAM(S): at 09:52

## 2018-05-11 ENCOUNTER — RX RENEWAL (OUTPATIENT)
Age: 68
End: 2018-05-11

## 2018-05-21 ENCOUNTER — LABORATORY RESULT (OUTPATIENT)
Age: 68
End: 2018-05-21

## 2018-05-21 ENCOUNTER — APPOINTMENT (OUTPATIENT)
Dept: HEMATOLOGY ONCOLOGY | Facility: CLINIC | Age: 68
End: 2018-05-21

## 2018-05-21 VITALS
TEMPERATURE: 96.7 F | BODY MASS INDEX: 25.55 KG/M2 | HEIGHT: 66 IN | HEART RATE: 75 BPM | DIASTOLIC BLOOD PRESSURE: 74 MMHG | SYSTOLIC BLOOD PRESSURE: 140 MMHG | RESPIRATION RATE: 16 BRPM | WEIGHT: 159 LBS

## 2018-05-21 LAB
HCT VFR BLD CALC: 27.6 %
HGB BLD-MCNC: 8.3 G/DL
MCHC RBC-ENTMCNC: 25.6 PG
MCHC RBC-ENTMCNC: 30.1 G/DL
MCV RBC AUTO: 85.2 FL
PLATELET # BLD AUTO: 166 K/UL
PMV BLD: 9.9 FL
RBC # BLD: 3.24 M/UL
RBC # FLD: 20.4 %
WBC # FLD AUTO: 7.49 K/UL

## 2018-05-21 NOTE — RESULTS/DATA
[FreeTextEntry1] :  Result Name Results Units Reference Range \par      PET CT WHOLE BODY TOP OF SKULL TO TI   SEE TEXT       \par     Patient Name: MOR, AUGUST : 1950\par Patient ID: [id number]\par Account: [id number]\par Patient Location: [facility]\par Accession: [id number]\par Procedure: PET CT WHOLE BODY TOP OF SKULL TO TIP OF TOES SUBSEQUENT 250-0036\par Date of Exam: 2017 11:09 AM\par Attending Physician: JARVIS LOW\par Requesting Physician: JARVIS LOW MD\par Clinical History / Reason for exam: FDG PET CT STUDY:\par Reason for examination: Tumor imaging - PET with concurrently acquired CT for\par attenuation correction and anatomic localization;  top of the skull  to toes/\par 00678 multiple myeloma, subsequent treatment strategy.\par ICD-10 code:C90.0\par History: 66-year-old male patient with history of multiple myeloma, last\par chemotherapy received was in 2017. Patient had bone biopsy on\par 2017.\par Blood glucose pre injection 102 mg/dL\par Technique:\par Approximately 45 minutes after the intravenous administration of 13.4  mCi\par 18-Fluorine FDG, whole body PET images were acquired from top of  the skull to\par toes. In addition, non-iv and non oral contrast, low dose, non - diagnostic CT\par was acquired for attenuation correction and anatomic correlation only.\par Findings:\par Comparison: Prior PET CT study done on July 3, 2015. Correlation was performed\par with the skeletal survey done on 2016.\par Head /Neck: Physiologic FDG uptake is seen in the visualized region of the\par brain, pharyngeal lymphoid tissue, and salivary glands.\par Chest:\par Physiologic FDG avid PET is seen in the mediastinal blood pool and myocardium.\par Chest wall: Unremarkable\par Lungs: No abnormal uptake.\par Mediastinum/Pleura/pericardium: No abnormal uptake.\par Thoracic/ axillary lymph nodes: No abnormal uptake.\par Hepatobiliary: Unremarkable.\par Gallbladder: Multiple gallstones.\par Spleen: No abnormal uptake\par Pancreas: No abnormal uptake.\par Adrenal glands: No abnormal uptake.\par Kidneys/ureters/bladder: Normal excretory activity is demonstrated.\par Abdominal pelvic lymph nodes: No abnormal uptake.\par Bowel/peritoneum/mesentery: No abnormal uptake.\par Pelvic organs: Unremarkable. No abnormal increased uptake. Prosthetic\par calcifications.\par Bones/soft tissues: Osteoarthritic changes are identified in the bones. \par Patient\par has multiple extensive lytic lesions in the skeletal, seen in the skeletal\par survey  for details see the report  on 2016.\par In the prior examination there was PET positive lytic lesion right fifth rib\par with a max SUV 2.6, now non-FDG avid   and L5 vertebral body on the left side\par with a max SUV 3.8, now non-FDG avid with multiple other scattered lytic\par lesions, non-FDG avid.\par No abnormal increased uptake is seen in the lower extremities.\par Impression:\par 1. No new areas of abnormal increased uptake is seen.\par 2. Previously seen abnormal increased uptake seen in the right fifth rib and \par L5\par vertebral body on the left side, at this time both regions are non-FDG avid.\par 3. Multiple extensive lytic lesions in the skeletal consistent with multiple\par myeloma.\par 4. No other areas of abnormal increased uptake is seen.\par I the attending attest that I have personally reviewed these images and agree\par with this report.\par \par  \par

## 2018-05-21 NOTE — HISTORY OF PRESENT ILLNESS
[de-identified] : REASON FOR FOLLOWUP:  Maintenance Velcade for lambda light chain myeloma, status post autologous stem cell transplant.\par \par TREATMENT HISTORY:  On 05/02/2015, he was diagnosed with lambda light chain multiple myeloma when he had a syncopal episode in [place] and was found to be in acute renal failure.  He underwent a bone marrow biopsy that confirmed lambda light chain multiple myeloma.  His beta2 microglobulin was initially 27.1 on diagnosis.  His lambda light chain prior to treatment was as high as 80806.2 on 02/27/2015.  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  There was also diffuse acute tubular injury and modular tubular atrophy with interstitial fibrosis initially on dialysis, but currently been off dialysis for approximately one year.\par \par His treatment summary is as follows.  He initiated Velcade, Cytoxan, and dexamethasone, cycle started on 03/13/2015.  He completed four cycles on 05/08/2015.  He then underwent an autologous bone marrow transplant in 07/2015 at [place].  He has been on maintenance Velcade but in 6/17 was started on DRD due to  rising lambda with normal kappa.\par \par Mr. Motta is a very pleasant 66yearold gentleman with history of lambda light chain multiple myeloma, treated as above.\par  [Therapy: ___] : Therapy: [unfilled] [Cycle: ___] : Cycle: [unfilled] [FreeTextEntry1] : DRD started  on 6/21/17 [de-identified] : He presents today for followup she is currently on maintenance Velcade.his blood work from October 27 demonstrated a stage a faint lambda band on urine immunofixation. Nomi a count 7.66 hemoglobin 8.3 platelet count of 132. The SPEP was normal. Creatinine was stable at 5.93 potassium was 5.2 total protein 5.9 unremarkable LFTs free kappa lambda ratio normal 1.27 with free kappa at 42 and free lambda at 33.1. Most recent blood work from 1110 2016 shows a hemoglobin of 7.8. \par \par he was recently seen by Dr. Rod in  [place]. He had blood work done and reports that everything was stable. Dr. Rod also recommended to decrease his Velcade dose to 1.3 mg/m2\par \par He has no complaints. He denies any neuropathy.\par \par 1/4/17\par Presents for follow up today. Doing well on Maintenance Velcade.  No complaints. No neuropathy. \par \par 2/1/17\par He presents for follow up today. He has no complaints. Tolerating Velcade well.\par \par 4/5/17\par Doing well No complaints blood work from last visit was stable.\par \par 5/1/17\par He presents for follow up. He had increasing lambda light chains. Repeat was stable. A bone marrow showed minimal plasma cells and PET CT did not show any new activity.  He has no complaints.\par \par 6/5/2017\par Presents for follow up. He is doing well. He has some fatigue. His  Lambda has been slowly increasing. He has no other complaints.  No neuropathy\par \par 6/12/17\par He was brought back to discuss his lab work. His Lambda has been increasing with stable Kappa as well as slight worsening of his of creatinine He has no complaints.\par \par 7/12/17\par He has been on DRD. His HgB dropped to 5.9 and he is s/p PRBC transfusion.  He finished Revlimid on 7/9. His only complaint is leg crams at night. \par \par 8/9/17\par He is here for follow up. His cramps went away with the lower dose of Revlimid. His His lambda is coming down. He saw Dr. Rod last week as per patient.\par \par 8/30/27\par He is doing well. His legs cramps are better. He has severe anemia but he is asymptomatic. HIs creatinine is better and his light chain is at baseline.\par \par 9/27/17\par He is here for follow up. He needed  blood transfusion and hgb has been stable at 8 since than. He is also status post Venofer x2. He is on Procrit\par  40,000 units every 2 weeks. He feels well otherwise. \par \par 11/20/17\par Patient feels well and has no complaints. States he was taking Revlimid 5mg q48hrs prior to stopping for low counts. Patient will get Procrit and Darzalex. Patient will be restarted on Revilimid 2.5mg. Patient will continue every 2week Darzalex until week 25 then it becomes every 4 weeks. \par \par 12/18/17\par He is here for follow up. He is doing well. Blood work from last month still in CR. Cr and counts are better. He has no complaints and feel great.\par \par 1/22/18\par He is here for follow up. He is recovering from a cold. He is taking a prednisone taper given by his PMD. Had mules pain from cough.\par \par 2/26/18\par He is doing well. He has no complaints. His labs have been stable his light chain only went up slightly but ratio is normal. \par \par 3/27/18\par He is here for follow up he is doing well. He had blood work in [facility]  where he saw Dr. Rod 3/26/18 WBC 6.7, HgB 11.3, Pts 140, LFT WNL, , Ca 9.7. Cr. 5.96, alb 3.2\par \par He feels well. \par \par 4/23/18\par No events feels well. No complaints\par \par 5/21/18\par He is here for follow up. Doing well. Has no complaints

## 2018-05-21 NOTE — RESULTS/DATA
[FreeTextEntry1] :  Result Name Results Units Reference Range \par      PET CT WHOLE BODY TOP OF SKULL TO TI   SEE TEXT       \par     Patient Name: MOR, AUGUST : 1950\par Patient ID: [id number]\par Account: [id number]\par Patient Location: [facility]\par Accession: [id number]\par Procedure: PET CT WHOLE BODY TOP OF SKULL TO TIP OF TOES SUBSEQUENT 250-0036\par Date of Exam: 2017 11:09 AM\par Attending Physician: JARVIS LOW\par Requesting Physician: JARVIS LOW MD\par Clinical History / Reason for exam: FDG PET CT STUDY:\par Reason for examination: Tumor imaging - PET with concurrently acquired CT for\par attenuation correction and anatomic localization;  top of the skull  to toes/\par 30534 multiple myeloma, subsequent treatment strategy.\par ICD-10 code:C90.0\par History: 66-year-old male patient with history of multiple myeloma, last\par chemotherapy received was in 2017. Patient had bone biopsy on\par 2017.\par Blood glucose pre injection 102 mg/dL\par Technique:\par Approximately 45 minutes after the intravenous administration of 13.4  mCi\par 18-Fluorine FDG, whole body PET images were acquired from top of  the skull to\par toes. In addition, non-iv and non oral contrast, low dose, non - diagnostic CT\par was acquired for attenuation correction and anatomic correlation only.\par Findings:\par Comparison: Prior PET CT study done on July 3, 2015. Correlation was performed\par with the skeletal survey done on 2016.\par Head /Neck: Physiologic FDG uptake is seen in the visualized region of the\par brain, pharyngeal lymphoid tissue, and salivary glands.\par Chest:\par Physiologic FDG avid PET is seen in the mediastinal blood pool and myocardium.\par Chest wall: Unremarkable\par Lungs: No abnormal uptake.\par Mediastinum/Pleura/pericardium: No abnormal uptake.\par Thoracic/ axillary lymph nodes: No abnormal uptake.\par Hepatobiliary: Unremarkable.\par Gallbladder: Multiple gallstones.\par Spleen: No abnormal uptake\par Pancreas: No abnormal uptake.\par Adrenal glands: No abnormal uptake.\par Kidneys/ureters/bladder: Normal excretory activity is demonstrated.\par Abdominal pelvic lymph nodes: No abnormal uptake.\par Bowel/peritoneum/mesentery: No abnormal uptake.\par Pelvic organs: Unremarkable. No abnormal increased uptake. Prosthetic\par calcifications.\par Bones/soft tissues: Osteoarthritic changes are identified in the bones. \par Patient\par has multiple extensive lytic lesions in the skeletal, seen in the skeletal\par survey  for details see the report  on 2016.\par In the prior examination there was PET positive lytic lesion right fifth rib\par with a max SUV 2.6, now non-FDG avid   and L5 vertebral body on the left side\par with a max SUV 3.8, now non-FDG avid with multiple other scattered lytic\par lesions, non-FDG avid.\par No abnormal increased uptake is seen in the lower extremities.\par Impression:\par 1. No new areas of abnormal increased uptake is seen.\par 2. Previously seen abnormal increased uptake seen in the right fifth rib and \par L5\par vertebral body on the left side, at this time both regions are non-FDG avid.\par 3. Multiple extensive lytic lesions in the skeletal consistent with multiple\par myeloma.\par 4. No other areas of abnormal increased uptake is seen.\par I the attending attest that I have personally reviewed these images and agree\par with this report.\par \par  \par

## 2018-05-21 NOTE — ASSESSMENT
[FreeTextEntry1] : 1.Free light chain multiple myeloma, specifically lambda.  Initial beta-2 was 27.1.  His bone marrow cytogenetics was normal.  He is status post four cycles of VCD.  This is followed by autologous bone marrow transplant in 07/2015,he has been on  weekly Velcade maintenance, He is also being followed by Dr. Krysta Rod in [place].  He currently takes his acyclovir.  Last myeloma panel  showed increasing lambda light chains but marrow showed minimal plasma cells with PET CT no progression.  HIs lambda continues to increase supporting biochemical recurrence with worsening creatinine  He was started on Daratumumab, Revlimid 5 mg daily d1-21 and  Dexamethasone  40 mg weekly PO with great response and is nos most likley in CR. Patient was taking Revilimid every other day and it was held for pancytopenia he is now on Revlamid 2.5 mg every other day d1-21 every 28 days takes 10 tabs.\par \par 2.Chronic kidney disease from multiple myeloma.  He has not been on dialysis in the past but is no off.  The creatinine has been relatively stable.  He follows up with Dr. Allen.\par \par 3.Normocytic anemia, related to chronic kidney disease and Revlimid  . Procrit  60,000 units every  week and s/p IV iron will keep saturation over 20% and Ferritin over 100. Will increase Procrit if needed and or transfuse . His HGB has been improving\par \par \par 4. Moderate to severe   thrombocytopenia most likely from Revlimid. Platelets improved\par \par \par \par PLAN:\par -free light chains are  in the normal/expected range due to CKD. \par -Will proceed with  treatment  if remains in CR will proceed with Revlimid maintenance at 12 months after next month\par - revlimid 2.5mg every other day  21 days cycle of 28  and dexa to 20 mg weekly \par -labs ordered\par -anemia HgB now under 10 ,  Procrit on hold will restart 60,000 units monthty\par -once MM is active again will use Xgeva\par -RTC in one month\par -Arranged his post transplant  vaccinations \par \par

## 2018-05-21 NOTE — HISTORY OF PRESENT ILLNESS
[de-identified] : REASON FOR FOLLOWUP:  Maintenance Velcade for lambda light chain myeloma, status post autologous stem cell transplant.\par \par TREATMENT HISTORY:  On 05/02/2015, he was diagnosed with lambda light chain multiple myeloma when he had a syncopal episode in [place] and was found to be in acute renal failure.  He underwent a bone marrow biopsy that confirmed lambda light chain multiple myeloma.  His beta2 microglobulin was initially 27.1 on diagnosis.  His lambda light chain prior to treatment was as high as 65410.2 on 02/27/2015.  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  There was also diffuse acute tubular injury and modular tubular atrophy with interstitial fibrosis initially on dialysis, but currently been off dialysis for approximately one year.\par \par His treatment summary is as follows.  He initiated Velcade, Cytoxan, and dexamethasone, cycle started on 03/13/2015.  He completed four cycles on 05/08/2015.  He then underwent an autologous bone marrow transplant in 07/2015 at [place].  He has been on maintenance Velcade but in 6/17 was started on DRD due to  rising lambda with normal kappa.\par \par Mr. Motta is a very pleasant 66yearold gentleman with history of lambda light chain multiple myeloma, treated as above.\par  [Therapy: ___] : Therapy: [unfilled] [Cycle: ___] : Cycle: [unfilled] [FreeTextEntry1] : DRD started  on 6/21/17 [de-identified] : He presents today for followup she is currently on maintenance Velcade.his blood work from October 27 demonstrated a stage a faint lambda band on urine immunofixation. Nomi a count 7.66 hemoglobin 8.3 platelet count of 132. The SPEP was normal. Creatinine was stable at 5.93 potassium was 5.2 total protein 5.9 unremarkable LFTs free kappa lambda ratio normal 1.27 with free kappa at 42 and free lambda at 33.1. Most recent blood work from 1110 2016 shows a hemoglobin of 7.8. \par \par he was recently seen by Dr. Rod in  [place]. He had blood work done and reports that everything was stable. Dr. Rod also recommended to decrease his Velcade dose to 1.3 mg/m2\par \par He has no complaints. He denies any neuropathy.\par \par 1/4/17\par Presents for follow up today. Doing well on Maintenance Velcade.  No complaints. No neuropathy. \par \par 2/1/17\par He presents for follow up today. He has no complaints. Tolerating Velcade well.\par \par 4/5/17\par Doing well No complaints blood work from last visit was stable.\par \par 5/1/17\par He presents for follow up. He had increasing lambda light chains. Repeat was stable. A bone marrow showed minimal plasma cells and PET CT did not show any new activity.  He has no complaints.\par \par 6/5/2017\par Presents for follow up. He is doing well. He has some fatigue. His  Lambda has been slowly increasing. He has no other complaints.  No neuropathy\par \par 6/12/17\par He was brought back to discuss his lab work. His Lambda has been increasing with stable Kappa as well as slight worsening of his of creatinine He has no complaints.\par \par 7/12/17\par He has been on DRD. His HgB dropped to 5.9 and he is s/p PRBC transfusion.  He finished Revlimid on 7/9. His only complaint is leg crams at night. \par \par 8/9/17\par He is here for follow up. His cramps went away with the lower dose of Revlimid. His His lambda is coming down. He saw Dr. Rod last week as per patient.\par \par 8/30/27\par He is doing well. His legs cramps are better. He has severe anemia but he is asymptomatic. HIs creatinine is better and his light chain is at baseline.\par \par 9/27/17\par He is here for follow up. He needed  blood transfusion and hgb has been stable at 8 since than. He is also status post Venofer x2. He is on Procrit\par  40,000 units every 2 weeks. He feels well otherwise. \par \par 11/20/17\par Patient feels well and has no complaints. States he was taking Revlimid 5mg q48hrs prior to stopping for low counts. Patient will get Procrit and Darzalex. Patient will be restarted on Revilimid 2.5mg. Patient will continue every 2week Darzalex until week 25 then it becomes every 4 weeks. \par \par 12/18/17\par He is here for follow up. He is doing well. Blood work from last month still in CR. Cr and counts are better. He has no complaints and feel great.\par \par 1/22/18\par He is here for follow up. He is recovering from a cold. He is taking a prednisone taper given by his PMD. Had mules pain from cough.\par \par 2/26/18\par He is doing well. He has no complaints. His labs have been stable his light chain only went up slightly but ratio is normal. \par \par 3/27/18\par He is here for follow up he is doing well. He had blood work in [facility]  where he saw Dr. Rod 3/26/18 WBC 6.7, HgB 11.3, Pts 140, LFT WNL, , Ca 9.7. Cr. 5.96, alb 3.2\par \par He feels well. \par \par 4/23/18\par No events feels well. No complaints\par \par 5/21/18\par He is here for follow up. Doing well. Has no complaints

## 2018-05-21 NOTE — REVIEW OF SYSTEMS
[Fatigue] : no fatigue [Dry Eyes] : no dryness of the eyes [Dysphagia] : no dysphagia [Odynophagia] : no odynophagia [Mucosal Pain] : no mucosal pain [Leg Claudication] : no intermittent leg claudication [Lower Ext Edema] : no lower extremity edema [SOB on Exertion] : no shortness of breath during exertion [Muscle Pain] : no muscle pain [Negative] : Heme/Lymph

## 2018-05-21 NOTE — ASSESSMENT
[FreeTextEntry1] : 1.Free light chain multiple myeloma, specifically lambda.  Initial beta-2 was 27.1.  His bone marrow cytogenetics was normal.  He is status post four cycles of VCD.  This is followed by autologous bone marrow transplant in 07/2015,he has been on  weekly Velcade maintenance, He is also being followed by Dr. Krysta Rod in [place].  He currently takes his acyclovir.  Last myeloma panel  showed increasing lambda light chains but marrow showed minimal plasma cells with PET CT no progression.  HIs lambda continues to increase supporting biochemical recurrence with worsening creatinine  He was started on Daratumumab, Revlimid 5 mg daily d1-21 and  Dexamethasone  40 mg weekly PO with great response and is nos most likley in CR. Patient was taking Revilimid every other day and it was held for pancytopenia he is now on Revlamid 2.5 mg every other day d1-21 every 28 days takes 10 tabs.\par \par 2.Chronic kidney disease from multiple myeloma.  He has not been on dialysis in the past but is no off.  The creatinine has been relatively stable.  He follows up with Dr. Allen.\par \par 3.Normocytic anemia, related to chronic kidney disease and Revlimid  . Procrit  60,000 units every  week and s/p IV iron will keep saturation over 20% and Ferritin over 100. Will increase Procrit if needed and or transfuse . His HGB has been improving\par \par \par 4. Moderate to severe   thrombocytopenia most likely from Revlimid. Platelets improved\par \par \par \par PLAN:\par -free light chains are  in the normal/expected range due to CKD. \par -Will proceed with  treatment  if remains in CR will proceed with Revlimid maintenance at 12 months after next month\par - revlimid 2.5mg every other day  21 days cycle of 28  and dexa to 20 mg weekly \par -labs ordered\par -anemia HgB now under 10 ,  Procrit on hold will restart 60,000 units monthty\par -once MM is active again will use Xgeva\par -RTC in one month\par -Arranged his post transplant  vaccinations \par \par

## 2018-05-22 LAB
ALBUMIN SERPL ELPH-MCNC: 3.7 G/DL
ALP BLD-CCNC: 56 U/L
ALT SERPL-CCNC: 6 U/L
ANION GAP SERPL CALC-SCNC: 18 MMOL/L
AST SERPL-CCNC: 6 U/L
BILIRUB SERPL-MCNC: <0.2 MG/DL
BUN SERPL-MCNC: 64 MG/DL
CALCIUM SERPL-MCNC: 9.4 MG/DL
CHLORIDE SERPL-SCNC: 106 MMOL/L
CO2 SERPL-SCNC: 19 MMOL/L
CREAT SERPL-MCNC: 5.6 MG/DL
GLUCOSE SERPL-MCNC: 92 MG/DL
POTASSIUM SERPL-SCNC: 5 MMOL/L
PROT SERPL-MCNC: 5.5 G/DL
SODIUM SERPL-SCNC: 143 MMOL/L

## 2018-05-23 ENCOUNTER — APPOINTMENT (OUTPATIENT)
Dept: INFUSION THERAPY | Facility: CLINIC | Age: 68
End: 2018-05-23

## 2018-05-23 RX ORDER — DIPHENHYDRAMINE HCL 50 MG
50 CAPSULE ORAL ONCE
Qty: 0 | Refills: 0 | Status: COMPLETED | OUTPATIENT
Start: 2018-05-23 | End: 2018-05-23

## 2018-05-23 RX ORDER — ERYTHROPOIETIN 10000 [IU]/ML
60000 INJECTION, SOLUTION INTRAVENOUS; SUBCUTANEOUS ONCE
Qty: 0 | Refills: 0 | Status: COMPLETED | OUTPATIENT
Start: 2018-05-23 | End: 2018-05-23

## 2018-05-23 RX ORDER — DARATUMUMAB 100 MG/5ML
1185 INJECTION, SOLUTION, CONCENTRATE INTRAVENOUS ONCE
Qty: 0 | Refills: 0 | Status: COMPLETED | OUTPATIENT
Start: 2018-05-23 | End: 2018-05-23

## 2018-05-23 RX ORDER — ACETAMINOPHEN 500 MG
650 TABLET ORAL ONCE
Qty: 0 | Refills: 0 | Status: COMPLETED | OUTPATIENT
Start: 2018-05-23 | End: 2018-05-23

## 2018-05-23 RX ADMIN — Medication 650 MILLIGRAM(S): at 08:55

## 2018-05-23 RX ADMIN — Medication 103.2 MILLIGRAM(S): at 08:54

## 2018-05-23 RX ADMIN — DARATUMUMAB 186.42 MILLIGRAM(S): 100 INJECTION, SOLUTION, CONCENTRATE INTRAVENOUS at 09:48

## 2018-05-23 RX ADMIN — Medication 153 MILLIGRAM(S): at 08:54

## 2018-05-23 RX ADMIN — ERYTHROPOIETIN 60000 UNIT(S): 10000 INJECTION, SOLUTION INTRAVENOUS; SUBCUTANEOUS at 08:55

## 2018-05-24 LAB
DEPRECATED KAPPA LC FREE/LAMBDA SER: 1.06 RATIO
KAPPA LC CSF-MCNC: 1.41 MG/DL
KAPPA LC SERPL-MCNC: 1.5 MG/DL

## 2018-06-06 ENCOUNTER — RX RENEWAL (OUTPATIENT)
Age: 68
End: 2018-06-06

## 2018-06-18 ENCOUNTER — APPOINTMENT (OUTPATIENT)
Dept: HEMATOLOGY ONCOLOGY | Facility: CLINIC | Age: 68
End: 2018-06-18

## 2018-06-18 ENCOUNTER — APPOINTMENT (OUTPATIENT)
Dept: INFUSION THERAPY | Facility: CLINIC | Age: 68
End: 2018-06-18

## 2018-06-18 ENCOUNTER — LABORATORY RESULT (OUTPATIENT)
Age: 68
End: 2018-06-18

## 2018-06-18 VITALS
SYSTOLIC BLOOD PRESSURE: 197 MMHG | DIASTOLIC BLOOD PRESSURE: 80 MMHG | TEMPERATURE: 97.1 F | HEART RATE: 80 BPM | WEIGHT: 156 LBS | RESPIRATION RATE: 16 BRPM | BODY MASS INDEX: 25.07 KG/M2 | HEIGHT: 66 IN

## 2018-06-18 LAB
ALBUMIN SERPL ELPH-MCNC: 4 G/DL
ALP BLD-CCNC: 51 U/L
ALT SERPL-CCNC: 7 U/L
ANION GAP SERPL CALC-SCNC: 21 MMOL/L
AST SERPL-CCNC: 9 U/L
BILIRUB SERPL-MCNC: <0.2 MG/DL
BUN SERPL-MCNC: 77 MG/DL
CALCIUM SERPL-MCNC: 9.3 MG/DL
CHLORIDE SERPL-SCNC: 103 MMOL/L
CO2 SERPL-SCNC: 18 MMOL/L
CREAT SERPL-MCNC: 5.8 MG/DL
GLUCOSE SERPL-MCNC: 99 MG/DL
HCT VFR BLD CALC: 27.6 %
HGB BLD-MCNC: 8.4 G/DL
IRON SATN MFR SERPL: 27 %
IRON SERPL-MCNC: 63 UG/DL
MCHC RBC-ENTMCNC: 25.1 PG
MCHC RBC-ENTMCNC: 30.4 G/DL
MCV RBC AUTO: 82.6 FL
PLATELET # BLD AUTO: 161 K/UL
PMV BLD: 10.9 FL
POTASSIUM SERPL-SCNC: 4.4 MMOL/L
PROT SERPL-MCNC: 5.9 G/DL
RBC # BLD: 3.34 M/UL
RBC # FLD: 21.2 %
SODIUM SERPL-SCNC: 142 MMOL/L
TIBC SERPL-MCNC: 232 UG/DL
UIBC SERPL-MCNC: 169 UG/DL
WBC # FLD AUTO: 7.03 K/UL

## 2018-06-18 RX ORDER — ERYTHROPOIETIN 10000 [IU]/ML
60000 INJECTION, SOLUTION INTRAVENOUS; SUBCUTANEOUS ONCE
Qty: 0 | Refills: 0 | Status: COMPLETED | OUTPATIENT
Start: 2018-06-18 | End: 2018-06-18

## 2018-06-18 RX ADMIN — ERYTHROPOIETIN 60000 UNIT(S): 10000 INJECTION, SOLUTION INTRAVENOUS; SUBCUTANEOUS at 09:09

## 2018-06-18 NOTE — RESULTS/DATA
[FreeTextEntry1] :  Result Name Results Units Reference Range \par      PET CT WHOLE BODY TOP OF SKULL TO TI   SEE TEXT       \par     Patient Name: MOR, AUGUST : 1950\par Patient ID: [id number]\par Account: [id number]\par Patient Location: [facility]\par Accession: [id number]\par Procedure: PET CT WHOLE BODY TOP OF SKULL TO TIP OF TOES SUBSEQUENT [id number]\par Date of Exam: 2017 11:09 AM\par Attending Physician: JARVIS LOW\par Requesting Physician: JARVIS LOW MD\par Clinical History / Reason for exam: FDG PET CT STUDY:\par Reason for examination: Tumor imaging - PET with concurrently acquired CT for\par attenuation correction and anatomic localization;  top of the skull  to toes/\par [address]7836 multiple myeloma, subsequent treatment strategy.\par ICD-10 code:C90.0\par History: 66-year-old male patient with history of multiple myeloma, last\par chemotherapy received was in 2017. Patient had bone biopsy on\par 2017.\par Blood glucose pre injection 102 mg/dL\par Technique:\par Approximately 45 minutes after the intravenous administration of 13.4  mCi\par 18-Fluorine FDG, whole body PET images were acquired from top of  the skull to\par toes. In addition, non-iv and non oral contrast, low dose, non - diagnostic CT\par was acquired for attenuation correction and anatomic correlation only.\par Findings:\par Comparison: Prior PET CT study done on July 3, 2015. Correlation was performed\par with the skeletal survey done on 2016.\par Head /Neck: Physiologic FDG uptake is seen in the visualized region of the\par brain, pharyngeal lymphoid tissue, and salivary glands.\par Chest:\par Physiologic FDG avid PET is seen in the mediastinal blood pool and myocardium.\par Chest wall: Unremarkable\par Lungs: No abnormal uptake.\par Mediastinum/Pleura/pericardium: No abnormal uptake.\par Thoracic/ axillary lymph nodes: No abnormal uptake.\par Hepatobiliary: Unremarkable.\par Gallbladder: Multiple gallstones.\par Spleen: No abnormal uptake\par Pancreas: No abnormal uptake.\par Adrenal glands: No abnormal uptake.\par Kidneys/ureters/bladder: Normal excretory activity is demonstrated.\par Abdominal pelvic lymph nodes: No abnormal uptake.\par Bowel/peritoneum/mesentery: No abnormal uptake.\par Pelvic organs: Unremarkable. No abnormal increased uptake. Prosthetic\par calcifications.\par Bones/soft tissues: Osteoarthritic changes are identified in the bones. \par Patient\par has multiple extensive lytic lesions in the skeletal, seen in the skeletal\par survey  for details see the report  on 2016.\par In the prior examination there was PET positive lytic lesion right fifth rib\par with a max SUV 2.6, now non-FDG avid   and L5 vertebral body on the left side\par with a max SUV 3.8, now non-FDG avid with multiple other scattered lytic\par lesions, non-FDG avid.\par No abnormal increased uptake is seen in the lower extremities.\par Impression:\par 1. No new areas of abnormal increased uptake is seen.\par 2. Previously seen abnormal increased uptake seen in the right fifth rib and \par L5\par vertebral body on the left side, at this time both regions are non-FDG avid.\par 3. Multiple extensive lytic lesions in the skeletal consistent with multiple\par myeloma.\par 4. No other areas of abnormal increased uptake is seen.\par I the attending attest that I have personally reviewed these images and agree\par with this report.\par \par  \par

## 2018-06-18 NOTE — REVIEW OF SYSTEMS
[Negative] : Heme/Lymph [Fatigue] : no fatigue [Dry Eyes] : no dryness of the eyes [Dysphagia] : no dysphagia [Odynophagia] : no odynophagia [Mucosal Pain] : no mucosal pain [Leg Claudication] : no intermittent leg claudication [Lower Ext Edema] : no lower extremity edema [SOB on Exertion] : no shortness of breath during exertion [Muscle Pain] : no muscle pain

## 2018-06-18 NOTE — ASSESSMENT
[FreeTextEntry1] : 1.Free light chain multiple myeloma, specifically lambda.  Initial beta-2 was 27.1.  His bone marrow cytogenetics was normal.  He is status post four cycles of VCD.  This is followed by autologous bone marrow transplant in 07/2015,he has been on  weekly Velcade maintenance, He is also being followed by Dr. Krysta Rod in [place].  He currently takes his acyclovir.  Last myeloma panel  showed increasing lambda light chains but marrow showed minimal plasma cells with PET CT no progression.  HIs lambda continues to increase supporting biochemical recurrence with worsening creatinine  He was started on Daratumumab, Revlimid 5 mg daily d1-21 and  Dexamethasone  40 mg weekly PO with great response and is nos most likley in CR. Patient was taking Revilimid every other day and it was held for pancytopenia he is now on Revlamid 2.5 mg every other day d1-21 every 28 days takes 10 tabs.. 6/18/18 went on Revlamid maintenance. Achieved a VGPR.\par \par 2.Chronic kidney disease from multiple myeloma.  He has not been on dialysis in the past but is no off.  The creatinine has been relatively stable.  He follows up with Dr. Allen.\par \par 3.Normocytic anemia, related to chronic kidney disease and Revlimid  . Procrit  60,000 units every  week and s/p IV iron will keep saturation over 20% and Ferritin over 100. . His HGB decrease again\par \par \par 4. Moderate to severe   thrombocytopenia most likely from Revlimid. Platelets improved\par \par \par \par PLAN:\par -free light chains are  in the normal/expected range due to CKD.  In VGPR maybe CR will recheck Full MM profile\par -Will proceed   Revlimid maintenance.\par - revlimid 2.5mg every other day  21 days cycle of 28 \par -anemia HgB now under 10  and coming down,  Procrit  60,000 units monthly not enough will go to every 2 weeeks. BP was high will recheck in back\par -once MM is active again will use Xgeva\par -RTC in one month\par -Arranged his post transplant  vaccinations in August\par \par

## 2018-06-18 NOTE — HISTORY OF PRESENT ILLNESS
[Therapy: ___] : Therapy: [unfilled] [de-identified] : REASON FOR FOLLOWUP:  Maintenance Velcade for lambda light chain myeloma, status post autologous stem cell transplant.\par \par TREATMENT HISTORY:  On 05/02/2015, he was diagnosed with lambda light chain multiple myeloma when he had a syncopal episode in [place] and was found to be in acute renal failure.  He underwent a bone marrow biopsy that confirmed lambda light chain multiple myeloma.  His beta2 microglobulin was initially 27.1 on diagnosis.  His lambda light chain prior to treatment was as high as 37467.2 on 02/27/2015.  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  There was also diffuse acute tubular injury and modular tubular atrophy with interstitial fibrosis initially on dialysis, but currently been off dialysis for approximately one year.\par \par His treatment summary is as follows.  He initiated Velcade, Cytoxan, and dexamethasone, cycle started on 03/13/2015.  He completed four cycles on 05/08/2015.  He then underwent an autologous bone marrow transplant in 07/2015 at [place].  He has been on maintenance Velcade but in 6/17 was started on DRD due to  rising lambda with normal kappa.\par \par Mr. Motta is a very pleasant 66yearold gentleman with history of lambda light chain multiple myeloma, treated as above.\par  [FreeTextEntry1] : DRD started  on 6/21/17 vesta on Revlamid maintenance 6/18/18 [de-identified] : He presents today for followup she is currently on maintenance Velcade.his blood work from October 27 demonstrated a stage a faint lambda band on urine immunofixation. Nomi a count 7.66 hemoglobin 8.3 platelet count of 132. The SPEP was normal. Creatinine was stable at 5.93 potassium was 5.2 total protein 5.9 unremarkable LFTs free kappa lambda ratio normal 1.27 with free kappa at 42 and free lambda at 33.1. Most recent blood work from 1110 2016 shows a hemoglobin of 7.8. \par \par he was recently seen by Dr. Rod in  [place]. He had blood work done and reports that everything was stable. Dr. Rod also recommended to decrease his Velcade dose to 1.3 mg/m2\par \par He has no complaints. He denies any neuropathy.\par \par 1/4/17\par Presents for follow up today. Doing well on Maintenance Velcade.  No complaints. No neuropathy. \par \par 2/1/17\par He presents for follow up today. He has no complaints. Tolerating Velcade well.\par \par 4/5/17\par Doing well No complaints blood work from last visit was stable.\par \par 5/1/17\par He presents for follow up. He had increasing lambda light chains. Repeat was stable. A bone marrow showed minimal plasma cells and PET CT did not show any new activity.  He has no complaints.\par \par 6/5/2017\par Presents for follow up. He is doing well. He has some fatigue. His  Lambda has been slowly increasing. He has no other complaints.  No neuropathy\par \par 6/12/17\par He was brought back to discuss his lab work. His Lambda has been increasing with stable Kappa as well as slight worsening of his of creatinine He has no complaints.\par \par 7/12/17\par He has been on DRD. His HgB dropped to 5.9 and he is s/p PRBC transfusion.  He finished Revlimid on 7/9. His only complaint is leg crams at night. \par \par 8/9/17\par He is here for follow up. His cramps went away with the lower dose of Revlimid. His His lambda is coming down. He saw Dr. Rod last week as per patient.\par \par 8/30/27\par He is doing well. His legs cramps are better. He has severe anemia but he is asymptomatic. HIs creatinine is better and his light chain is at baseline.\par \par 9/27/17\par He is here for follow up. He needed  blood transfusion and hgb has been stable at 8 since than. He is also status post Venofer x2. He is on Procrit\par  40,000 units every 2 weeks. He feels well otherwise. \par \par 11/20/17\par Patient feels well and has no complaints. States he was taking Revlimid 5mg q48hrs prior to stopping for low counts. Patient will get Procrit and Darzalex. Patient will be restarted on Revilimid 2.5mg. Patient will continue every 2week Darzalex until week 25 then it becomes every 4 weeks. \par \par 12/18/17\par He is here for follow up. He is doing well. Blood work from last month still in CR. Cr and counts are better. He has no complaints and feel great.\par \par 1/22/18\par He is here for follow up. He is recovering from a cold. He is taking a prednisone taper given by his PMD. Had mules pain from cough.\par \par 2/26/18\par He is doing well. He has no complaints. His labs have been stable his light chain only went up slightly but ratio is normal. \par \par 3/27/18\par He is here for follow up he is doing well. He had blood work in [facility]  where he saw Dr. Rod 3/26/18 WBC 6.7, HgB 11.3, Pts 140, LFT WNL, , Ca 9.7. Cr. 5.96, alb 3.2\par \par He feels well. \par \par 4/23/18\par No events feels well. No complaints\par \par 5/21/18\par He is here for follow up. Doing well. Has no complaints\par \par 6/18/18\par His hgb has been dropping on monthly Procrit, His ligh chains are normal last Serum LUCIA from april 23 showed IgG kappa band thus he achieved aVGPR for DRD. He has no complaints.

## 2018-06-19 LAB — FERRITIN SERPL-MCNC: 242 NG/ML

## 2018-06-20 LAB
ALBUMIN MFR SERPL ELPH: 62.5 %
ALBUMIN SERPL-MCNC: 3.8 G/DL
ALBUMIN/GLOB SERPL: 1.7 RATIO
ALPHA1 GLOB MFR SERPL ELPH: 6.4 %
ALPHA1 GLOB SERPL ELPH-MCNC: 0.4 G/DL
ALPHA2 GLOB MFR SERPL ELPH: 15 %
ALPHA2 GLOB SERPL ELPH-MCNC: 0.9 G/DL
B-GLOBULIN MFR SERPL ELPH: 10.4 %
B-GLOBULIN SERPL ELPH-MCNC: 0.6 G/DL
DEPRECATED KAPPA LC FREE/LAMBDA SER: 1.18 RATIO
GAMMA GLOB FLD ELPH-MCNC: 0.3 G/DL
GAMMA GLOB MFR SERPL ELPH: 5.7 %
IGA SER QL IEP: 61 MG/DL
IGG SER QL IEP: 389 MG/DL
IGM SER QL IEP: 17 MG/DL
INTERPRETATION SERPL IEP-IMP: NORMAL
KAPPA LC CSF-MCNC: 1.25 MG/DL
KAPPA LC SERPL-MCNC: 1.48 MG/DL
M PROTEIN MFR SERPL ELPH: NORMAL %
M PROTEIN SPEC IFE-MCNC: NORMAL
MONOCLON BAND OBS SERPL: NORMAL G/DL
PROT SERPL-MCNC: 6 G/DL
PROT SERPL-MCNC: 6 G/DL

## 2018-07-02 ENCOUNTER — APPOINTMENT (OUTPATIENT)
Dept: INFUSION THERAPY | Facility: CLINIC | Age: 68
End: 2018-07-02

## 2018-07-02 RX ORDER — ERYTHROPOIETIN 10000 [IU]/ML
60000 INJECTION, SOLUTION INTRAVENOUS; SUBCUTANEOUS ONCE
Qty: 0 | Refills: 0 | Status: COMPLETED | OUTPATIENT
Start: 2018-07-02 | End: 2018-07-02

## 2018-07-02 RX ADMIN — ERYTHROPOIETIN 60000 UNIT(S): 10000 INJECTION, SOLUTION INTRAVENOUS; SUBCUTANEOUS at 09:31

## 2018-07-05 ENCOUNTER — RX RENEWAL (OUTPATIENT)
Age: 68
End: 2018-07-05

## 2018-07-16 ENCOUNTER — APPOINTMENT (OUTPATIENT)
Dept: HEMATOLOGY ONCOLOGY | Facility: CLINIC | Age: 68
End: 2018-07-16

## 2018-07-16 ENCOUNTER — OUTPATIENT (OUTPATIENT)
Dept: OUTPATIENT SERVICES | Facility: HOSPITAL | Age: 68
LOS: 1 days | Discharge: HOME | End: 2018-07-16

## 2018-07-16 ENCOUNTER — LABORATORY RESULT (OUTPATIENT)
Age: 68
End: 2018-07-16

## 2018-07-16 ENCOUNTER — APPOINTMENT (OUTPATIENT)
Dept: INFUSION THERAPY | Facility: CLINIC | Age: 68
End: 2018-07-16

## 2018-07-16 DIAGNOSIS — N18.5 CHRONIC KIDNEY DISEASE, STAGE 5: ICD-10-CM

## 2018-07-16 LAB
HCT VFR BLD CALC: 32.6 %
HGB BLD-MCNC: 9.6 G/DL
MCHC RBC-ENTMCNC: 24.7 PG
MCHC RBC-ENTMCNC: 29.4 G/DL
MCV RBC AUTO: 83.8 FL
PLATELET # BLD AUTO: 183 K/UL
PMV BLD: 9.6 FL
RBC # BLD: 3.89 M/UL
RBC # FLD: 22 %
WBC # FLD AUTO: 6.02 K/UL

## 2018-07-16 RX ORDER — ERYTHROPOIETIN 10000 [IU]/ML
60000 INJECTION, SOLUTION INTRAVENOUS; SUBCUTANEOUS ONCE
Qty: 0 | Refills: 0 | Status: COMPLETED | OUTPATIENT
Start: 2018-07-16 | End: 2018-07-16

## 2018-07-16 RX ADMIN — ERYTHROPOIETIN 60000 UNIT(S): 10000 INJECTION, SOLUTION INTRAVENOUS; SUBCUTANEOUS at 08:58

## 2018-07-16 NOTE — HISTORY OF PRESENT ILLNESS
[de-identified] : REASON FOR FOLLOWUP:  Maintenance Velcade for lambda light chain myeloma, status post autologous stem cell transplant.\par \par TREATMENT HISTORY:  On 05/02/2015, he was diagnosed with lambda light chain multiple myeloma when he had a syncopal episode in [place] and was found to be in acute renal failure.  He underwent a bone marrow biopsy that confirmed lambda light chain multiple myeloma.  His beta2 microglobulin was initially 27.1 on diagnosis.  His lambda light chain prior to treatment was as high as 47622.2 on 02/27/2015.  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  There was also diffuse acute tubular injury and modular tubular atrophy with interstitial fibrosis initially on dialysis, but currently been off dialysis for approximately one year.\par \par His treatment summary is as follows.  He initiated Velcade, Cytoxan, and dexamethasone, cycle started on 03/13/2015.  He completed four cycles on 05/08/2015.  He then underwent an autologous bone marrow transplant in 07/2015 at [place].  He has been on maintenance Velcade but in 6/17 was started on DRD due to  rising lambda with normal kappa.\par \par Mr. Motta is a very pleasant 66yearold gentleman with history of lambda light chain multiple myeloma, treated as above.\par  [Therapy: ___] : Therapy: [unfilled] [FreeTextEntry1] : DRD started  on 6/21/17 vesta on Revlimid maintenance 6/18/18 [de-identified] : He presents today for followup she is currently on maintenance Velcade.his blood work from October 27 demonstrated a stage a faint lambda band on urine immunofixation. Nomi a count 7.66 hemoglobin 8.3 platelet count of 132. The SPEP was normal. Creatinine was stable at 5.93 potassium was 5.2 total protein 5.9 unremarkable LFTs free kappa lambda ratio normal 1.27 with free kappa at 42 and free lambda at 33.1. Most recent blood work from 1110 2016 shows a hemoglobin of 7.8. \par \par he was recently seen by Dr. Rod in  [place]. He had blood work done and reports that everything was stable. Dr. Rod also recommended to decrease his Velcade dose to 1.3 mg/m2\par \par He has no complaints. He denies any neuropathy.\par \par 1/4/17\par Presents for follow up today. Doing well on Maintenance Velcade.  No complaints. No neuropathy. \par \par 2/1/17\par He presents for follow up today. He has no complaints. Tolerating Velcade well.\par \par 4/5/17\par Doing well No complaints blood work from last visit was stable.\par \par 5/1/17\par He presents for follow up. He had increasing lambda light chains. Repeat was stable. A bone marrow showed minimal plasma cells and PET CT did not show any new activity.  He has no complaints.\par \par 6/5/2017\par Presents for follow up. He is doing well. He has some fatigue. His  Lambda has been slowly increasing. He has no other complaints.  No neuropathy\par \par 6/12/17\par He was brought back to discuss his lab work. His Lambda has been increasing with stable Kappa as well as slight worsening of his of creatinine He has no complaints.\par \par 7/12/17\par He has been on DRD. His HgB dropped to 5.9 and he is s/p PRBC transfusion.  He finished Revlimid on 7/9. His only complaint is leg crams at night. \par \par 8/9/17\par He is here for follow up. His cramps went away with the lower dose of Revlimid. His His lambda is coming down. He saw Dr. Rod last week as per patient.\par \par 8/30/27\par He is doing well. His legs cramps are better. He has severe anemia but he is asymptomatic. HIs creatinine is better and his light chain is at baseline.\par \par 9/27/17\par He is here for follow up. He needed  blood transfusion and hgb has been stable at 8 since than. He is also status post Venofer x2. He is on Procrit\par  40,000 units every 2 weeks. He feels well otherwise. \par \par 11/20/17\par Patient feels well and has no complaints. States he was taking Revlimid 5mg q48hrs prior to stopping for low counts. Patient will get Procrit and Darzalex. Patient will be restarted on Revilimid 2.5mg. Patient will continue every 2week Darzalex until week 25 then it becomes every 4 weeks. \par \par 12/18/17\par He is here for follow up. He is doing well. Blood work from last month still in CR. Cr and counts are better. He has no complaints and feel great.\par \par 1/22/18\par He is here for follow up. He is recovering from a cold. He is taking a prednisone taper given by his PMD. Had mules pain from cough.\par \par 2/26/18\par He is doing well. He has no complaints. His labs have been stable his light chain only went up slightly but ratio is normal. \par \par 3/27/18\par He is here for follow up he is doing well. He had blood work in [facility]  where he saw Dr. Rod 3/26/18 WBC 6.7, HgB 11.3, Pts 140, LFT WNL, , Ca 9.7. Cr. 5.96, alb 3.2\par \par He feels well. \par \par 4/23/18\par No events feels well. No complaints\par \par 5/21/18\par He is here for follow up. Doing well. Has no complaints\par \par 6/18/18\par His hgb has been dropping on monthly Procrit, His ligh chains are normal last Serum LUCIA from april 23 showed IgG kappa band thus he achieved aVGPR for DRD. He has no complaints.\par \par 7/16/18\par He is here for follow up. Doing well. No complaints. His kitten bit him.

## 2018-07-16 NOTE — RESULTS/DATA
[FreeTextEntry1] :  Result Name Results Units Reference Range \par      PET CT WHOLE BODY TOP OF SKULL TO TI   SEE TEXT       \par     Patient Name: MOR, AUGUST : 1950\par Patient ID: [id number]\par Account: [id number]\par Patient Location: [facility]\par Accession: [id number]\par Procedure: PET CT WHOLE BODY TOP OF SKULL TO TIP OF TOES SUBSEQUENT [id number]\par Date of Exam: 2017 11:09 AM\par Attending Physician: JARVIS LOW\par Requesting Physician: JARVIS LOW MD\par Clinical History / Reason for exam: FDG PET CT STUDY:\par Reason for examination: Tumor imaging - PET with concurrently acquired CT for\par attenuation correction and anatomic localization;  top of the skull  to toes/\par 54165 multiple myeloma, subsequent treatment strategy.\par ICD-10 code:C90.0\par History: 66-year-old male patient with history of multiple myeloma, last\par chemotherapy received was in 2017. Patient had bone biopsy on\par 2017.\par Blood glucose pre injection 102 mg/dL\par Technique:\par Approximately 45 minutes after the intravenous administration of 13.4  mCi\par 18-Fluorine FDG, whole body PET images were acquired from top of  the skull to\par toes. In addition, non-iv and non oral contrast, low dose, non - diagnostic CT\par was acquired for attenuation correction and anatomic correlation only.\par Findings:\par Comparison: Prior PET CT study done on July 3, 2015. Correlation was performed\par with the skeletal survey done on 2016.\par Head /Neck: Physiologic FDG uptake is seen in the visualized region of the\par brain, pharyngeal lymphoid tissue, and salivary glands.\par Chest:\par Physiologic FDG avid PET is seen in the mediastinal blood pool and myocardium.\par Chest wall: Unremarkable\par Lungs: No abnormal uptake.\par Mediastinum/Pleura/pericardium: No abnormal uptake.\par Thoracic/ axillary lymph nodes: No abnormal uptake.\par Hepatobiliary: Unremarkable.\par Gallbladder: Multiple gallstones.\par Spleen: No abnormal uptake\par Pancreas: No abnormal uptake.\par Adrenal glands: No abnormal uptake.\par Kidneys/ureters/bladder: Normal excretory activity is demonstrated.\par Abdominal pelvic lymph nodes: No abnormal uptake.\par Bowel/peritoneum/mesentery: No abnormal uptake.\par Pelvic organs: Unremarkable. No abnormal increased uptake. Prosthetic\par calcifications.\par Bones/soft tissues: Osteoarthritic changes are identified in the bones. \par Patient\par has multiple extensive lytic lesions in the skeletal, seen in the skeletal\par survey  for details see the report  on 2016.\par In the prior examination there was PET positive lytic lesion right fifth rib\par with a max SUV 2.6, now non-FDG avid   and L5 vertebral body on the left side\par with a max SUV 3.8, now non-FDG avid with multiple other scattered lytic\par lesions, non-FDG avid.\par No abnormal increased uptake is seen in the lower extremities.\par Impression:\par 1. No new areas of abnormal increased uptake is seen.\par 2. Previously seen abnormal increased uptake seen in the right fifth rib and \par L5\par vertebral body on the left side, at this time both regions are non-FDG avid.\par 3. Multiple extensive lytic lesions in the skeletal consistent with multiple\par myeloma.\par 4. No other areas of abnormal increased uptake is seen.\par I the attending attest that I have personally reviewed these images and agree\par with this report.\par \par  \par

## 2018-07-16 NOTE — ASSESSMENT
[FreeTextEntry1] : 1.Free light chain multiple myeloma, specifically lambda.  Initial beta-2 was 27.1.  His bone marrow cytogenetics was normal.  He is status post four cycles of VCD.  This is followed by autologous bone marrow transplant in 07/2015,he has been on  weekly Velcade maintenance, He is also being followed by Dr. Krysta Rod in [place].  He currently takes his acyclovir.  Last myeloma panel  showed increasing lambda light chains but marrow showed minimal plasma cells with PET CT no progression.  HIs lambda continues to increase supporting biochemical recurrence with worsening creatinine  He was started on Daratumumab, Revlimid 5 mg daily d1-21 and  Dexamethasone  40 mg weekly PO with great response and is nos most likley in CR. Patient was taking Revilimid every other day and it was held for pancytopenia he is now on Revlamid 2.5 mg every other day d1-21 every 28 days takes 10 tabs.. 6/18/18 went on Revlamid maintenance. Achieved a VGPR.\par \par 2.Chronic kidney disease from multiple myeloma.  He has not been on dialysis in the past but is no off.  The creatinine has been relatively stable.  He follows up with Dr. Allen.\par \par 3.Normocytic anemia, related to chronic kidney disease and Revlimid  . Procrit  60,000 units every  week and s/p IV iron will keep saturation over 20% and Ferritin over 100. . His HGB decrease again\par \par \par 4. Moderate to severe   thrombocytopenia most likely from Revlimid. Platelets improved\par \par \par \par PLAN:\par -free light chains are  in the normal/expected range due to CKD.  In VGPR maybe CR  Myeloma panel stable will recheck\par -On  Revlimid maintenance.\par - revlimid 2.5mg every other day  21 days cycle of 28 \par -anemia HgB now under 10  and  increasing  on  Procrit  60,000   every 2 weeks. BP is fine \par -once MM is active again will use Xgeva\par -RTC in one month\par -Arranged his post transplant  vaccinations in August, he is due for remaining\par \par

## 2018-07-17 LAB
ALBUMIN SERPL ELPH-MCNC: 3.9 G/DL
ALP BLD-CCNC: 64 U/L
ALT SERPL-CCNC: 5 U/L
ANION GAP SERPL CALC-SCNC: 19 MMOL/L
AST SERPL-CCNC: 10 U/L
BILIRUB SERPL-MCNC: <0.2 MG/DL
BUN SERPL-MCNC: 61 MG/DL
CALCIUM SERPL-MCNC: 9.4 MG/DL
CHLORIDE SERPL-SCNC: 103 MMOL/L
CO2 SERPL-SCNC: 20 MMOL/L
CREAT SERPL-MCNC: 6.1 MG/DL
GLUCOSE SERPL-MCNC: 95 MG/DL
POTASSIUM SERPL-SCNC: 4.7 MMOL/L
PROT SERPL-MCNC: 5.8 G/DL
SODIUM SERPL-SCNC: 142 MMOL/L

## 2018-07-18 LAB
ALBUMIN MFR SERPL ELPH: 62.2 %
ALBUMIN SERPL-MCNC: 3.7 G/DL
ALBUMIN/GLOB SERPL: 1.7 RATIO
ALPHA1 GLOB MFR SERPL ELPH: 6.9 %
ALPHA1 GLOB SERPL ELPH-MCNC: 0.4 G/DL
ALPHA2 GLOB MFR SERPL ELPH: 14.7 %
ALPHA2 GLOB SERPL ELPH-MCNC: 0.9 G/DL
B-GLOBULIN MFR SERPL ELPH: 10.2 %
B-GLOBULIN SERPL ELPH-MCNC: 0.6 G/DL
DEPRECATED KAPPA LC FREE/LAMBDA SER: 1.22 RATIO
GAMMA GLOB FLD ELPH-MCNC: 0.4 G/DL
GAMMA GLOB MFR SERPL ELPH: 6 %
IGA SER QL IEP: 78 MG/DL
IGG SER QL IEP: 336 MG/DL
IGM SER QL IEP: 16 MG/DL
INTERPRETATION SERPL IEP-IMP: NORMAL
KAPPA LC CSF-MCNC: 1.95 MG/DL
KAPPA LC SERPL-MCNC: 2.38 MG/DL
M PROTEIN SPEC IFE-MCNC: NORMAL
PROT SERPL-MCNC: 5.9 G/DL
PROT SERPL-MCNC: 5.9 G/DL

## 2018-07-30 ENCOUNTER — APPOINTMENT (OUTPATIENT)
Dept: INFUSION THERAPY | Facility: CLINIC | Age: 68
End: 2018-07-30

## 2018-07-30 ENCOUNTER — OUTPATIENT (OUTPATIENT)
Dept: OUTPATIENT SERVICES | Facility: HOSPITAL | Age: 68
LOS: 1 days | Discharge: HOME | End: 2018-07-30

## 2018-07-30 ENCOUNTER — LABORATORY RESULT (OUTPATIENT)
Age: 68
End: 2018-07-30

## 2018-07-30 DIAGNOSIS — C90.01 MULTIPLE MYELOMA IN REMISSION: ICD-10-CM

## 2018-07-30 DIAGNOSIS — D63.1 ANEMIA IN CHRONIC KIDNEY DISEASE: ICD-10-CM

## 2018-07-30 RX ORDER — ERYTHROPOIETIN 10000 [IU]/ML
60000 INJECTION, SOLUTION INTRAVENOUS; SUBCUTANEOUS ONCE
Qty: 0 | Refills: 0 | Status: DISCONTINUED | OUTPATIENT
Start: 2018-07-30 | End: 2018-07-30

## 2018-08-01 ENCOUNTER — RX RENEWAL (OUTPATIENT)
Age: 68
End: 2018-08-01

## 2018-08-07 LAB
HCT VFR BLD CALC: 37.4 %
HGB BLD-MCNC: 11.4 G/DL
MCHC RBC-ENTMCNC: 24.9 PG
MCHC RBC-ENTMCNC: 30.5 G/DL
MCV RBC AUTO: 81.7 FL
PLATELET # BLD AUTO: 161 K/UL
PMV BLD: NORMAL
RBC # BLD: 4.58 M/UL
RBC # FLD: 21.5 %
WBC # FLD AUTO: 9.1 K/UL

## 2018-08-13 ENCOUNTER — LABORATORY RESULT (OUTPATIENT)
Age: 68
End: 2018-08-13

## 2018-08-13 ENCOUNTER — APPOINTMENT (OUTPATIENT)
Dept: HEMATOLOGY ONCOLOGY | Facility: CLINIC | Age: 68
End: 2018-08-13

## 2018-08-13 ENCOUNTER — APPOINTMENT (OUTPATIENT)
Dept: INFUSION THERAPY | Facility: CLINIC | Age: 68
End: 2018-08-13

## 2018-08-13 VITALS
BODY MASS INDEX: 25.88 KG/M2 | HEIGHT: 66 IN | RESPIRATION RATE: 16 BRPM | WEIGHT: 161 LBS | HEART RATE: 66 BPM | TEMPERATURE: 96.6 F | SYSTOLIC BLOOD PRESSURE: 144 MMHG | DIASTOLIC BLOOD PRESSURE: 78 MMHG

## 2018-08-13 NOTE — RESULTS/DATA
[FreeTextEntry1] :  Result Name Results Units Reference Range \par      PET CT WHOLE BODY TOP OF SKULL TO TI   SEE TEXT       \par     Patient Name: MOR, AUGUST : 1950\par Patient ID: [id number]\par Account: [id number]\par Patient Location: [facility]\par Accession: [id number]\par Procedure: PET CT WHOLE BODY TOP OF SKULL TO TIP OF TOES SUBSEQUENT 250-0036\par Date of Exam: 2017 11:09 AM\par Attending Physician: JARVIS LOW\par Requesting Physician: JARVIS LOW MD\par Clinical History / Reason for exam: FDG PET CT STUDY:\par Reason for examination: Tumor imaging - PET with concurrently acquired CT for\par attenuation correction and anatomic localization;  top of the skull  to toes/\par [address] multiple myeloma, subsequent treatment strategy.\par ICD-10 code:C90.0\par History: 66-year-old male patient with history of multiple myeloma, last\par chemotherapy received was in 2017. Patient had bone biopsy on\par 2017.\par Blood glucose pre injection 102 mg/dL\par Technique:\par Approximately 45 minutes after the intravenous administration of 13.4  mCi\par 18-Fluorine FDG, whole body PET images were acquired from top of  the skull to\par toes. In addition, non-iv and non oral contrast, low dose, non - diagnostic CT\par was acquired for attenuation correction and anatomic correlation only.\par Findings:\par Comparison: Prior PET CT study done on July 3, 2015. Correlation was performed\par with the skeletal survey done on 2016.\par Head /Neck: Physiologic FDG uptake is seen in the visualized region of the\par brain, pharyngeal lymphoid tissue, and salivary glands.\par Chest:\par Physiologic FDG avid PET is seen in the mediastinal blood pool and myocardium.\par Chest wall: Unremarkable\par Lungs: No abnormal uptake.\par Mediastinum/Pleura/pericardium: No abnormal uptake.\par Thoracic/ axillary lymph nodes: No abnormal uptake.\par Hepatobiliary: Unremarkable.\par Gallbladder: Multiple gallstones.\par Spleen: No abnormal uptake\par Pancreas: No abnormal uptake.\par Adrenal glands: No abnormal uptake.\par Kidneys/ureters/bladder: Normal excretory activity is demonstrated.\par Abdominal pelvic lymph nodes: No abnormal uptake.\par Bowel/peritoneum/mesentery: No abnormal uptake.\par Pelvic organs: Unremarkable. No abnormal increased uptake. Prosthetic\par calcifications.\par Bones/soft tissues: Osteoarthritic changes are identified in the bones. \par Patient\par has multiple extensive lytic lesions in the skeletal, seen in the skeletal\par survey  for details see the report  on 2016.\par In the prior examination there was PET positive lytic lesion right fifth rib\par with a max SUV 2.6, now non-FDG avid   and L5 vertebral body on the left side\par with a max SUV 3.8, now non-FDG avid with multiple other scattered lytic\par lesions, non-FDG avid.\par No abnormal increased uptake is seen in the lower extremities.\par Impression:\par 1. No new areas of abnormal increased uptake is seen.\par 2. Previously seen abnormal increased uptake seen in the right fifth rib and \par L5\par vertebral body on the left side, at this time both regions are non-FDG avid.\par 3. Multiple extensive lytic lesions in the skeletal consistent with multiple\par myeloma.\par 4. No other areas of abnormal increased uptake is seen.\par I the attending attest that I have personally reviewed these images and agree\par with this report.\par \par  \par

## 2018-08-13 NOTE — HISTORY OF PRESENT ILLNESS
[Therapy: ___] : Therapy: [unfilled] [FreeTextEntry1] : DRD started  on 6/21/17 vesta on Revlimid maintenance 6/18/18 [de-identified] : He presents today for followup she is currently on maintenance Velcade.his blood work from October 27 demonstrated a stage a faint lambda band on urine immunofixation. Nomi a count 7.66 hemoglobin 8.3 platelet count of 132. The SPEP was normal. Creatinine was stable at 5.93 potassium was 5.2 total protein 5.9 unremarkable LFTs free kappa lambda ratio normal 1.27 with free kappa at 42 and free lambda at 33.1. Most recent blood work from 1110 2016 shows a hemoglobin of 7.8. \par \par he was recently seen by Dr. Rod in  [place]. He had blood work done and reports that everything was stable. Dr. Rod also recommended to decrease his Velcade dose to 1.3 mg/m2\par \par He has no complaints. He denies any neuropathy.\par \par 1/4/17\par Presents for follow up today. Doing well on Maintenance Velcade.  No complaints. No neuropathy. \par \par 2/1/17\par He presents for follow up today. He has no complaints. Tolerating Velcade well.\par \par 4/5/17\par Doing well No complaints blood work from last visit was stable.\par \par 5/1/17\par He presents for follow up. He had increasing lambda light chains. Repeat was stable. A bone marrow showed minimal plasma cells and PET CT did not show any new activity.  He has no complaints.\par \par 6/5/2017\par Presents for follow up. He is doing well. He has some fatigue. His  Lambda has been slowly increasing. He has no other complaints.  No neuropathy\par \par 6/12/17\par He was brought back to discuss his lab work. His Lambda has been increasing with stable Kappa as well as slight worsening of his of creatinine He has no complaints.\par \par 7/12/17\par He has been on DRD. His HgB dropped to 5.9 and he is s/p PRBC transfusion.  He finished Revlimid on 7/9. His only complaint is leg crams at night. \par \par 8/9/17\par He is here for follow up. His cramps went away with the lower dose of Revlimid. His His lambda is coming down. He saw Dr. Rod last week as per patient.\par \par 8/30/27\par He is doing well. His legs cramps are better. He has severe anemia but he is asymptomatic. HIs creatinine is better and his light chain is at baseline.\par \par 9/27/17\par He is here for follow up. He needed  blood transfusion and hgb has been stable at 8 since than. He is also status post Venofer x2. He is on Procrit\par  40,000 units every 2 weeks. He feels well otherwise. \par \par 11/20/17\par Patient feels well and has no complaints. States he was taking Revlimid 5mg q48hrs prior to stopping for low counts. Patient will get Procrit and Darzalex. Patient will be restarted on Revilimid 2.5mg. Patient will continue every 2week Darzalex until week 25 then it becomes every 4 weeks. \par \par 12/18/17\par He is here for follow up. He is doing well. Blood work from last month still in CR. Cr and counts are better. He has no complaints and feel great.\par \par 1/22/18\par He is here for follow up. He is recovering from a cold. He is taking a prednisone taper given by his PMD. Had mules pain from cough.\par \par 2/26/18\par He is doing well. He has no complaints. His labs have been stable his light chain only went up slightly but ratio is normal. \par \par 3/27/18\par He is here for follow up he is doing well. He had blood work in [facility]  where he saw Dr. Rod 3/26/18 WBC 6.7, HgB 11.3, Pts 140, LFT WNL, , Ca 9.7. Cr. 5.96, alb 3.2\par \par He feels well. \par \par 4/23/18\par No events feels well. No complaints\par \par 5/21/18\par He is here for follow up. Doing well. Has no complaints\par \par 6/18/18\par His hgb has been dropping on monthly Procrit, His ligh chains are normal last Serum LUCIA from april 23 showed IgG kappa band thus he achieved aVGPR for DRD. He has no complaints.\par \par 7/16/18\par He is here for follow up. Doing well. No complaints. His kitten bit him.\par \par 8/13/18\par He is doing well. No complains. His HGb has been over 11 gm/dl So we are holding his procrit. [de-identified] : REASON FOR FOLLOWUP:  Maintenance Velcade for lambda light chain myeloma, status post autologous stem cell transplant.\par \par TREATMENT HISTORY:  On 05/02/2015, he was diagnosed with lambda light chain multiple myeloma when he had a syncopal episode in [place] and was found to be in acute renal failure.  He underwent a bone marrow biopsy that confirmed lambda light chain multiple myeloma.  His beta2 microglobulin was initially 27.1 on diagnosis.  His lambda light chain prior to treatment was as high as 60712.2 on 02/27/2015.  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  There was also diffuse acute tubular injury and modular tubular atrophy with interstitial fibrosis initially on dialysis, but currently been off dialysis for approximately one year.\par \par His treatment summary is as follows.  He initiated Velcade, Cytoxan, and dexamethasone, cycle started on 03/13/2015.  He completed four cycles on 05/08/2015.  He then underwent an autologous bone marrow transplant in 07/2015 at [place].  He has been on maintenance Velcade but in 6/17 was started on DRD due to  rising lambda with normal kappa.\par \par Mr. Motta is a very pleasant 66yearold gentleman with history of lambda light chain multiple myeloma, treated as above.\par

## 2018-08-13 NOTE — ASSESSMENT
[FreeTextEntry1] : 1.Free light chain multiple myeloma, specifically lambda.  Initial beta-2 was 27.1.  His bone marrow cytogenetics was normal.  He is status post four cycles of VCD.  This is followed by autologous bone marrow transplant in 07/2015,he has been on  weekly Velcade maintenance, He is also being followed by Dr. Krysta Rod in [place].  He currently takes his acyclovir.  Last myeloma panel  showed increasing lambda light chains but marrow showed minimal plasma cells with PET CT no progression.  HIs lambda continues to increase supporting biochemical recurrence with worsening creatinine  He was started on Daratumumab, Revlimid 5 mg daily d1-21 and  Dexamethasone  40 mg weekly PO with great response and is nos most likley in CR. Patient was taking Revilimid every other day and it was held for pancytopenia he is now on Revlamid 2.5 mg every other day d1-21 every 28 days takes 10 tabs.. 6/18/18 went on Revlamid maintenance. Achieved a VGPR.\par \par 2.Chronic kidney disease from multiple myeloma.  He has not been on dialysis in the past but is no off.  The creatinine has been relatively stable.  He follows up with Dr. Allen.\par \par 3.Normocytic anemia, related to chronic kidney disease and Revlimid  . Procrit  60,000 units every  week and s/p IV iron will keep saturation over 20% and Ferritin over 100. . His HGB decrease again\par \par \par 4. Moderate to severe   thrombocytopenia most likely from Revlimid. Platelets improved\par \par \par \par PLAN:\par -free light chains are  in the normal/expected range due to CKD.  In VGPR maybe CR  Myeloma panel stable will recheck.\par -On  Revlimid maintenance.\par - Revlimid 2.5mg every other day  21 days cycle of 28 \par -anemia HgB now  over 10 for over on month will hold procrit\par -once MM is active again will use Xgeva\par -RTC in one month\par -Arranged his post transplant  vaccinations in August, he is due for remaining vaccines at the 2 year kaveh \par \par

## 2018-08-16 LAB
ALBUMIN MFR SERPL ELPH: 61.5 %
ALBUMIN SERPL ELPH-MCNC: 4 G/DL
ALBUMIN SERPL-MCNC: 3.8 G/DL
ALBUMIN/GLOB SERPL: 1.7 RATIO
ALP BLD-CCNC: 61 U/L
ALPHA1 GLOB MFR SERPL ELPH: 6.6 %
ALPHA1 GLOB SERPL ELPH-MCNC: 0.4 G/DL
ALPHA2 GLOB MFR SERPL ELPH: 14.6 %
ALPHA2 GLOB SERPL ELPH-MCNC: 0.9 G/DL
ALT SERPL-CCNC: 7 U/L
ANION GAP SERPL CALC-SCNC: 18 MMOL/L
AST SERPL-CCNC: 9 U/L
B-GLOBULIN MFR SERPL ELPH: 10.5 %
B-GLOBULIN SERPL ELPH-MCNC: 0.6 G/DL
BILIRUB SERPL-MCNC: <0.2 MG/DL
BUN SERPL-MCNC: 62 MG/DL
CALCIUM SERPL-MCNC: 10.1 MG/DL
CHLORIDE SERPL-SCNC: 101 MMOL/L
CO2 SERPL-SCNC: 20 MMOL/L
CREAT SERPL-MCNC: 6 MG/DL
DEPRECATED KAPPA LC FREE/LAMBDA SER: 1.26 RATIO
GAMMA GLOB FLD ELPH-MCNC: 0.4 G/DL
GAMMA GLOB MFR SERPL ELPH: 6.8 %
GLUCOSE SERPL-MCNC: 120 MG/DL
HCT VFR BLD CALC: 36.6 %
HGB BLD-MCNC: 11.3 G/DL
IGA SER QL IEP: 100 MG/DL
IGG SER QL IEP: 509 MG/DL
IGM SER QL IEP: 18 MG/DL
INTERPRETATION SERPL IEP-IMP: NORMAL
KAPPA LC CSF-MCNC: 1.75 MG/DL
KAPPA LC SERPL-MCNC: 2.2 MG/DL
M PROTEIN SPEC IFE-MCNC: NORMAL
MCHC RBC-ENTMCNC: 24.4 PG
MCHC RBC-ENTMCNC: 30.9 G/DL
MCV RBC AUTO: 78.9 FL
PLATELET # BLD AUTO: 211 K/UL
PMV BLD: 10.4 FL
POTASSIUM SERPL-SCNC: 4.4 MMOL/L
PROT SERPL-MCNC: 6.1 G/DL
RBC # BLD: 4.64 M/UL
RBC # FLD: 20.6 %
SODIUM SERPL-SCNC: 139 MMOL/L
WBC # FLD AUTO: 7.96 K/UL

## 2018-08-27 ENCOUNTER — APPOINTMENT (OUTPATIENT)
Dept: INFUSION THERAPY | Facility: CLINIC | Age: 68
End: 2018-08-27

## 2018-08-31 ENCOUNTER — RX RENEWAL (OUTPATIENT)
Age: 68
End: 2018-08-31

## 2018-09-07 ENCOUNTER — MOBILE ON CALL (OUTPATIENT)
Age: 68
End: 2018-09-07

## 2018-09-10 ENCOUNTER — APPOINTMENT (OUTPATIENT)
Dept: HEMATOLOGY ONCOLOGY | Facility: CLINIC | Age: 68
End: 2018-09-10

## 2018-09-10 ENCOUNTER — LABORATORY RESULT (OUTPATIENT)
Age: 68
End: 2018-09-10

## 2018-09-10 ENCOUNTER — APPOINTMENT (OUTPATIENT)
Dept: INFUSION THERAPY | Facility: CLINIC | Age: 68
End: 2018-09-10

## 2018-09-10 VITALS
DIASTOLIC BLOOD PRESSURE: 75 MMHG | HEART RATE: 60 BPM | WEIGHT: 161 LBS | TEMPERATURE: 97.3 F | RESPIRATION RATE: 16 BRPM | BODY MASS INDEX: 25.88 KG/M2 | SYSTOLIC BLOOD PRESSURE: 150 MMHG | HEIGHT: 66 IN

## 2018-09-10 RX ADMIN — Medication 0.5 MILLILITER(S): at 09:27

## 2018-09-10 RX ADMIN — Medication 0.5 MILLILITER(S): at 09:10

## 2018-09-10 RX ADMIN — Medication 0.5 MILLILITER(S): at 09:12

## 2018-09-10 NOTE — REVIEW OF SYSTEMS
[Fatigue] : no fatigue [Dry Eyes] : no dryness of the eyes [Dysphagia] : no dysphagia [Odynophagia] : no odynophagia [Mucosal Pain] : no mucosal pain [Leg Claudication] : no intermittent leg claudication [Lower Ext Edema] : no lower extremity edema [SOB on Exertion] : no shortness of breath during exertion [Muscle Pain] : no muscle pain [Negative] : Allergic/Immunologic

## 2018-09-10 NOTE — HISTORY OF PRESENT ILLNESS
[Therapy: ___] : Therapy: [unfilled] [de-identified] : REASON FOR FOLLOWUP:  Maintenance Velcade for lambda light chain myeloma, status post autologous stem cell transplant.\par \par TREATMENT HISTORY:  On 05/02/2015, he was diagnosed with lambda light chain multiple myeloma when he had a syncopal episode in [place] and was found to be in acute renal failure.  He underwent a bone marrow biopsy that confirmed lambda light chain multiple myeloma.  His beta2 microglobulin was initially 27.1 on diagnosis.  His lambda light chain prior to treatment was as high as 19682.2 on 02/27/2015.  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  There was also diffuse acute tubular injury and modular tubular atrophy with interstitial fibrosis initially on dialysis, but currently been off dialysis for approximately one year.\par \par His treatment summary is as follows.  He initiated Velcade, Cytoxan, and dexamethasone, cycle started on 03/13/2015.  He completed four cycles on 05/08/2015.  He then underwent an autologous bone marrow transplant in 07/2015 at [facility].  He has been on maintenance Velcade but in 6/17 was started on DRD due to  rising lambda with normal kappa.\par \par Mr. Motta is a very pleasant 66yearold gentleman with history of lambda light chain multiple myeloma, treated as above.\par  [FreeTextEntry1] : DRD started  on 6/21/17 vesta on Revlimid maintenance 6/18/18 [de-identified] : He presents today for followup she is currently on maintenance Velcade.his blood work from October 27 demonstrated a stage a faint lambda band on urine immunofixation. Nomi a count 7.66 hemoglobin 8.3 platelet count of 132. The SPEP was normal. Creatinine was stable at 5.93 potassium was 5.2 total protein 5.9 unremarkable LFTs free kappa lambda ratio normal 1.27 with free kappa at 42 and free lambda at 33.1. Most recent blood work from 1110 2016 shows a hemoglobin of 7.8. \par \par he was recently seen by Dr. Rod in  [place]. He had blood work done and reports that everything was stable. Dr. Rod also recommended to decrease his Velcade dose to 1.3 mg/m2\par \par He has no complaints. He denies any neuropathy.\par \par 1/4/17\par Presents for follow up today. Doing well on Maintenance Velcade.  No complaints. No neuropathy. \par \par 2/1/17\par He presents for follow up today. He has no complaints. Tolerating Velcade well.\par \par 4/5/17\par Doing well No complaints blood work from last visit was stable.\par \par 5/1/17\par He presents for follow up. He had increasing lambda light chains. Repeat was stable. A bone marrow showed minimal plasma cells and PET CT did not show any new activity.  He has no complaints.\par \par 6/5/2017\par Presents for follow up. He is doing well. He has some fatigue. His  Lambda has been slowly increasing. He has no other complaints.  No neuropathy\par \par 6/12/17\par He was brought back to discuss his lab work. His Lambda has been increasing with stable Kappa as well as slight worsening of his of creatinine He has no complaints.\par \par 7/12/17\par He has been on DRD. His HgB dropped to 5.9 and he is s/p PRBC transfusion.  He finished Revlimid on 7/9. His only complaint is leg crams at night. \par \par 8/9/17\par He is here for follow up. His cramps went away with the lower dose of Revlimid. His His lambda is coming down. He saw Dr. Rod last week as per patient.\par \par 8/30/27\par He is doing well. His legs cramps are better. He has severe anemia but he is asymptomatic. HIs creatinine is better and his light chain is at baseline.\par \par 9/27/17\par He is here for follow up. He needed  blood transfusion and hgb has been stable at 8 since than. He is also status post Venofer x2. He is on Procrit\par  40,000 units every 2 weeks. He feels well otherwise. \par \par 11/20/17\par Patient feels well and has no complaints. States he was taking Revlimid 5mg q48hrs prior to stopping for low counts. Patient will get Procrit and Darzalex. Patient will be restarted on Revilimid 2.5mg. Patient will continue every 2week Darzalex until week 25 then it becomes every 4 weeks. \par \par 12/18/17\par He is here for follow up. He is doing well. Blood work from last month still in CR. Cr and counts are better. He has no complaints and feel great.\par \par 1/22/18\par He is here for follow up. He is recovering from a cold. He is taking a prednisone taper given by his PMD. Had mules pain from cough.\par \par 2/26/18\par He is doing well. He has no complaints. His labs have been stable his light chain only went up slightly but ratio is normal. \par \par 3/27/18\par He is here for follow up he is doing well. He had blood work in [facility]  where he saw Dr. Rod 3/26/18 WBC 6.7, HgB 11.3, Pts 140, LFT WNL, , Ca 9.7. Cr. 5.96, alb 3.2\par \par He feels well. \par \par 4/23/18\par No events feels well. No complaints\par \par 5/21/18\par He is here for follow up. Doing well. Has no complaints\par \par 6/18/18\par His hgb has been dropping on monthly Procrit, His ligh chains are normal last Serum LUCIA from april 23 showed IgG kappa band thus he achieved aVGPR for DRD. He has no complaints.\par \par 7/16/18\par He is here for follow up. Doing well. No complaints. His kitten bit him.\par \par 8/13/18\par He is doing well. No complains. His HGb has been over 11 gm/dl So we are holding his procrit.\par \par 9/10/18\par He is here for follow up. He Myleoma panel is stable. Hgb is stable. He is due for his vaccinations 2 years post Auto and will have them today. He started Revlimid about 1 week ago. He has no complaints.

## 2018-09-10 NOTE — ASSESSMENT
[FreeTextEntry1] : 1.Free light chain multiple myeloma, specifically lambda.  Initial beta-2 was 27.1.  His bone marrow cytogenetics was normal.  He is status post four cycles of VCD.  This is followed by autologous bone marrow transplant in 07/2015,he has been on  weekly Velcade maintenance, He is also being followed by Dr. Krysta Rod in [place].  He currently takes his acyclovir.  Last myeloma panel  showed increasing lambda light chains but marrow showed minimal plasma cells with PET CT no progression.  HIs lambda continues to increase supporting biochemical recurrence with worsening creatinine  He was started on Daratumumab, Revlimid 5 mg daily d1-21 and  Dexamethasone  40 mg weekly PO with great response and is nos most likley in CR. Patient was taking Revilimid every other day and it was held for pancytopenia he is now on Revlamid 2.5 mg every other day d1-21 every 28 days takes 10 tabs.. 6/18/18 went on Revlamid maintenance. Achieved a VGPR.\par \par 2.Chronic kidney disease from multiple myeloma.  He has not been on dialysis in the past but is no off.  The creatinine has been relatively stable.  He follows up with Dr. Allen.\par \par 3.Normocytic anemia, related to chronic kidney disease and Revlimid  . Procrit  60,000 units every  week and s/p IV iron will keep saturation over 20% and Ferritin over 100. . His HGB decrease again\par \par \par 4. Moderate to severe   thrombocytopenia most likely from Revlimid. Platelets improved\par \par \par \par PLAN:\par -free light chains are  in the normal/expected range due to CKD.  In VGPR maybe CR  Myeloma panel stable will recheck.\par -On  Revlimid maintenance.\par - Revlimid 2.5mg every other day  21 days cycle of 28 \par -anemia HgB now  over 10 for over on month will hold procrit and monitor  cbc\par -once MM is active again will use Xgeva\par -RTC in one month\par - due for remaining vaccines at the 2 year kaveh now will have them today and [place] in one month\par \par

## 2018-09-10 NOTE — RESULTS/DATA
[FreeTextEntry1] :  Result Name Results Units Reference Range \par      PET CT WHOLE BODY TOP OF SKULL TO TI   SEE TEXT       \par     Patient Name: MOR, AUGUST : 1950\par Patient ID: [id number]\par Account: [id number]\par Patient Location: [facility]\par Accession: [id number]\par Procedure: PET CT WHOLE BODY TOP OF SKULL TO TIP OF TOES SUBSEQUENT 250-0036\par Date of Exam: 2017 11:09 AM\par Attending Physician: JARVIS LOW\par Requesting Physician: JARVIS LOW MD\par Clinical History / Reason for exam: FDG PET CT STUDY:\par Reason for examination: Tumor imaging - PET with concurrently acquired CT for\par attenuation correction and anatomic localization;  top of the skull  to toes/\par 50865 multiple myeloma, subsequent treatment strategy.\par ICD-10 code:C90.0\par History: 66-year-old male patient with history of multiple myeloma, last\par chemotherapy received was in 2017. Patient had bone biopsy on\par 2017.\par Blood glucose pre injection 102 mg/dL\par Technique:\par Approximately 45 minutes after the intravenous administration of 13.4  mCi\par 18-Fluorine FDG, whole body PET images were acquired from top of  the skull to\par toes. In addition, non-iv and non oral contrast, low dose, non - diagnostic CT\par was acquired for attenuation correction and anatomic correlation only.\par Findings:\par Comparison: Prior PET CT study done on July 3, 2015. Correlation was performed\par with the skeletal survey done on 2016.\par Head /Neck: Physiologic FDG uptake is seen in the visualized region of the\par brain, pharyngeal lymphoid tissue, and salivary glands.\par Chest:\par Physiologic FDG avid PET is seen in the mediastinal blood pool and myocardium.\par Chest wall: Unremarkable\par Lungs: No abnormal uptake.\par Mediastinum/Pleura/pericardium: No abnormal uptake.\par Thoracic/ axillary lymph nodes: No abnormal uptake.\par Hepatobiliary: Unremarkable.\par Gallbladder: Multiple gallstones.\par Spleen: No abnormal uptake\par Pancreas: No abnormal uptake.\par Adrenal glands: No abnormal uptake.\par Kidneys/ureters/bladder: Normal excretory activity is demonstrated.\par Abdominal pelvic lymph nodes: No abnormal uptake.\par Bowel/peritoneum/mesentery: No abnormal uptake.\par Pelvic organs: Unremarkable. No abnormal increased uptake. Prosthetic\par calcifications.\par Bones/soft tissues: Osteoarthritic changes are identified in the bones. \par Patient\par has multiple extensive lytic lesions in the skeletal, seen in the skeletal\par survey  for details see the report  on 2016.\par In the prior examination there was PET positive lytic lesion right fifth rib\par with a max SUV 2.6, now non-FDG avid   and L5 vertebral body on the left side\par with a max SUV 3.8, now non-FDG avid with multiple other scattered lytic\par lesions, non-FDG avid.\par No abnormal increased uptake is seen in the lower extremities.\par Impression:\par 1. No new areas of abnormal increased uptake is seen.\par 2. Previously seen abnormal increased uptake seen in the right fifth rib and \par L5\par vertebral body on the left side, at this time both regions are non-FDG avid.\par 3. Multiple extensive lytic lesions in the skeletal consistent with multiple\par myeloma.\par 4. No other areas of abnormal increased uptake is seen.\par I the attending attest that I have personally reviewed these images and agree\par with this report.\par \par  \par

## 2018-09-11 LAB
ALBUMIN SERPL ELPH-MCNC: 4.3 G/DL
ALP BLD-CCNC: 64 U/L
ALT SERPL-CCNC: 9 U/L
ANION GAP SERPL CALC-SCNC: 19 MMOL/L
AST SERPL-CCNC: 9 U/L
BILIRUB SERPL-MCNC: <0.2 MG/DL
BUN SERPL-MCNC: 60 MG/DL
CALCIUM SERPL-MCNC: 10.1 MG/DL
CHLORIDE SERPL-SCNC: 101 MMOL/L
CO2 SERPL-SCNC: 21 MMOL/L
CREAT SERPL-MCNC: 5.9 MG/DL
GLUCOSE SERPL-MCNC: 96 MG/DL
HCT VFR BLD CALC: 34.3 %
HGB BLD-MCNC: 10.7 G/DL
MCHC RBC-ENTMCNC: 24.4 PG
MCHC RBC-ENTMCNC: 31.2 G/DL
MCV RBC AUTO: 78.1 FL
PLATELET # BLD AUTO: 203 K/UL
PMV BLD: 10.2 FL
POTASSIUM SERPL-SCNC: 4.3 MMOL/L
PROT SERPL-MCNC: 6.5 G/DL
RBC # BLD: 4.39 M/UL
RBC # FLD: 19.9 %
SODIUM SERPL-SCNC: 141 MMOL/L
WBC # FLD AUTO: 7.33 K/UL

## 2018-09-12 LAB
ALBUMIN MFR SERPL ELPH: 62.9 %
ALBUMIN SERPL-MCNC: 4.1 G/DL
ALBUMIN/GLOB SERPL: 1.7 RATIO
ALPHA1 GLOB MFR SERPL ELPH: 6.1 %
ALPHA1 GLOB SERPL ELPH-MCNC: 0.4 G/DL
ALPHA2 GLOB MFR SERPL ELPH: 14.5 %
ALPHA2 GLOB SERPL ELPH-MCNC: 0.9 G/DL
B-GLOBULIN MFR SERPL ELPH: 10.1 %
B-GLOBULIN SERPL ELPH-MCNC: 0.7 G/DL
DEPRECATED KAPPA LC FREE/LAMBDA SER: 1.36 RATIO
GAMMA GLOB FLD ELPH-MCNC: 0.4 G/DL
GAMMA GLOB MFR SERPL ELPH: 6.4 %
IGA SER QL IEP: 100 MG/DL
IGG SER QL IEP: 460 MG/DL
IGM SER QL IEP: 15 MG/DL
INTERPRETATION SERPL IEP-IMP: NORMAL
KAPPA LC CSF-MCNC: 2.05 MG/DL
KAPPA LC SERPL-MCNC: 2.78 MG/DL
M PROTEIN SPEC IFE-MCNC: NORMAL
PROT SERPL-MCNC: 6.5 G/DL
PROT SERPL-MCNC: 6.5 G/DL

## 2018-09-27 ENCOUNTER — RX RENEWAL (OUTPATIENT)
Age: 68
End: 2018-09-27

## 2018-10-10 ENCOUNTER — APPOINTMENT (OUTPATIENT)
Dept: HEMATOLOGY ONCOLOGY | Facility: CLINIC | Age: 68
End: 2018-10-10

## 2018-10-10 ENCOUNTER — LABORATORY RESULT (OUTPATIENT)
Age: 68
End: 2018-10-10

## 2018-10-10 ENCOUNTER — APPOINTMENT (OUTPATIENT)
Dept: INFUSION THERAPY | Facility: CLINIC | Age: 68
End: 2018-10-10

## 2018-10-10 VITALS
HEART RATE: 77 BPM | HEIGHT: 66 IN | SYSTOLIC BLOOD PRESSURE: 145 MMHG | WEIGHT: 157 LBS | DIASTOLIC BLOOD PRESSURE: 71 MMHG | RESPIRATION RATE: 16 BRPM | TEMPERATURE: 96.9 F | BODY MASS INDEX: 25.23 KG/M2

## 2018-10-10 NOTE — HISTORY OF PRESENT ILLNESS
[Therapy: ___] : Therapy: [unfilled] [de-identified] : REASON FOR FOLLOWUP:  Maintenance Velcade for lambda light chain myeloma, status post autologous stem cell transplant.\par \par TREATMENT HISTORY:  On 05/02/2015, he was diagnosed with lambda light chain multiple myeloma when he had a syncopal episode in [place] and was found to be in acute renal failure.  He underwent a bone marrow biopsy that confirmed lambda light chain multiple myeloma.  His beta2 microglobulin was initially 27.1 on diagnosis.  His lambda light chain prior to treatment was as high as 10264.2 on 02/27/2015.  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  There was also diffuse acute tubular injury and modular tubular atrophy with interstitial fibrosis initially on dialysis, but currently been off dialysis for approximately one year.\par \par His treatment summary is as follows.  He initiated Velcade, Cytoxan, and dexamethasone, cycle started on 03/13/2015.  He completed four cycles on 05/08/2015.  He then underwent an autologous bone marrow transplant in 07/2015 at [place].  He has been on maintenance Velcade but in 6/17 was started on DRD due to  rising lambda with normal kappa.\par \par Mr. Motta is a very pleasant 66yearold gentleman with history of lambda light chain multiple myeloma, treated as above.\par  [FreeTextEntry1] : DRD started  on 6/21/17 vesta on Revlimid maintenance 6/18/18 [de-identified] : He presents today for followup she is currently on maintenance Velcade.his blood work from October 27 demonstrated a stage a faint lambda band on urine immunofixation. Nomi a count 7.66 hemoglobin 8.3 platelet count of 132. The SPEP was normal. Creatinine was stable at 5.93 potassium was 5.2 total protein 5.9 unremarkable LFTs free kappa lambda ratio normal 1.27 with free kappa at 42 and free lambda at 33.1. Most recent blood work from 1110 2016 shows a hemoglobin of 7.8. \par \par he was recently seen by Dr. Rod in  [place]. He had blood work done and reports that everything was stable. Dr. Rod also recommended to decrease his Velcade dose to 1.3 mg/m2\par \par He has no complaints. He denies any neuropathy.\par \par 1/4/17\par Presents for follow up today. Doing well on Maintenance Velcade.  No complaints. No neuropathy. \par \par 2/1/17\par He presents for follow up today. He has no complaints. Tolerating Velcade well.\par \par 4/5/17\par Doing well No complaints blood work from last visit was stable.\par \par 5/1/17\par He presents for follow up. He had increasing lambda light chains. Repeat was stable. A bone marrow showed minimal plasma cells and PET CT did not show any new activity.  He has no complaints.\par \par 6/5/2017\par Presents for follow up. He is doing well. He has some fatigue. His  Lambda has been slowly increasing. He has no other complaints.  No neuropathy\par \par 6/12/17\par He was brought back to discuss his lab work. His Lambda has been increasing with stable Kappa as well as slight worsening of his of creatinine He has no complaints.\par \par 7/12/17\par He has been on DRD. His HgB dropped to 5.9 and he is s/p PRBC transfusion.  He finished Revlimid on 7/9. His only complaint is leg crams at night. \par \par 8/9/17\par He is here for follow up. His cramps went away with the lower dose of Revlimid. His His lambda is coming down. He saw Dr. Rod last week as per patient.\par \par 8/30/27\par He is doing well. His legs cramps are better. He has severe anemia but he is asymptomatic. HIs creatinine is better and his light chain is at baseline.\par \par 9/27/17\par He is here for follow up. He needed  blood transfusion and hgb has been stable at 8 since than. He is also status post Venofer x2. He is on Procrit\par  40,000 units every 2 weeks. He feels well otherwise. \par \par 11/20/17\par Patient feels well and has no complaints. States he was taking Revlimid 5mg q48hrs prior to stopping for low counts. Patient will get Procrit and Darzalex. Patient will be restarted on Revilimid 2.5mg. Patient will continue every 2week Darzalex until week 25 then it becomes every 4 weeks. \par \par 12/18/17\par He is here for follow up. He is doing well. Blood work from last month still in CR. Cr and counts are better. He has no complaints and feel great.\par \par 1/22/18\par He is here for follow up. He is recovering from a cold. He is taking a prednisone taper given by his PMD. Had mules pain from cough.\par \par 2/26/18\par He is doing well. He has no complaints. His labs have been stable his light chain only went up slightly but ratio is normal. \par \par 3/27/18\par He is here for follow up he is doing well. He had blood work in [facility]  where he saw Dr. Rod 3/26/18 WBC 6.7, HgB 11.3, Pts 140, LFT WNL, , Ca 9.7. Cr. 5.96, alb 3.2\par \par He feels well. \par \par 4/23/18\par No events feels well. No complaints\par \par 5/21/18\par He is here for follow up. Doing well. Has no complaints\par \par 6/18/18\par His hgb has been dropping on monthly Procrit, His ligh chains are normal last Serum LUCIA from april 23 showed IgG kappa band thus he achieved aVGPR for DRD. He has no complaints.\par \par 7/16/18\par He is here for follow up. Doing well. No complaints. His kitten bit him.\par \par 8/13/18\par He is doing well. No complains. His HGb has been over 11 gm/dl So we are holding his procrit.\par \par 9/10/18\par He is here for follow up. He Myleoma panel is stable. Hgb is stable. He is due for his vaccinations 2 years post Auto and will have them today. He started Revlimid about 1 week ago. He has no complaints.\par \par 10/10/18\par He is here for follow up. He is doing well. BP is a bit high will watch. He has no complaints. He has his revlamid.

## 2018-10-10 NOTE — RESULTS/DATA
[FreeTextEntry1] :  Result Name Results Units Reference Range \par      PET CT WHOLE BODY TOP OF SKULL TO TI   SEE TEXT       \par     Patient Name: MOR, AUGUST : 1950\par Patient ID: [id number]\par Account: [id number]\par Patient Location: [facility]\par Accession: [id number]\par Procedure: PET CT WHOLE BODY TOP OF SKULL TO TIP OF TOES SUBSEQUENT 250-0036\par Date of Exam: 2017 11:09 AM\par Attending Physician: JARVIS LOW\par Requesting Physician: JARVIS LOW MD\par Clinical History / Reason for exam: FDG PET CT STUDY:\par Reason for examination: Tumor imaging - PET with concurrently acquired CT for\par attenuation correction and anatomic localization;  top of the skull  to toes/\par [address]74 multiple myeloma, subsequent treatment strategy.\par ICD-10 code:C90.0\par History: 66-year-old male patient with history of multiple myeloma, last\par chemotherapy received was in 2017. Patient had bone biopsy on\par 2017.\par Blood glucose pre injection 102 mg/dL\par Technique:\par Approximately 45 minutes after the intravenous administration of 13.4  mCi\par 18-Fluorine FDG, whole body PET images were acquired from top of  the skull to\par toes. In addition, non-iv and non oral contrast, low dose, non - diagnostic CT\par was acquired for attenuation correction and anatomic correlation only.\par Findings:\par Comparison: Prior PET CT study done on July 3, 2015. Correlation was performed\par with the skeletal survey done on 2016.\par Head /Neck: Physiologic FDG uptake is seen in the visualized region of the\par brain, pharyngeal lymphoid tissue, and salivary glands.\par Chest:\par Physiologic FDG avid PET is seen in the mediastinal blood pool and myocardium.\par Chest wall: Unremarkable\par Lungs: No abnormal uptake.\par Mediastinum/Pleura/pericardium: No abnormal uptake.\par Thoracic/ axillary lymph nodes: No abnormal uptake.\par Hepatobiliary: Unremarkable.\par Gallbladder: Multiple gallstones.\par Spleen: No abnormal uptake\par Pancreas: No abnormal uptake.\par Adrenal glands: No abnormal uptake.\par Kidneys/ureters/bladder: Normal excretory activity is demonstrated.\par Abdominal pelvic lymph nodes: No abnormal uptake.\par Bowel/peritoneum/mesentery: No abnormal uptake.\par Pelvic organs: Unremarkable. No abnormal increased uptake. Prosthetic\par calcifications.\par Bones/soft tissues: Osteoarthritic changes are identified in the bones. \par Patient\par has multiple extensive lytic lesions in the skeletal, seen in the skeletal\par survey  for details see the report  on 2016.\par In the prior examination there was PET positive lytic lesion right fifth rib\par with a max SUV 2.6, now non-FDG avid   and L5 vertebral body on the left side\par with a max SUV 3.8, now non-FDG avid with multiple other scattered lytic\par lesions, non-FDG avid.\par No abnormal increased uptake is seen in the lower extremities.\par Impression:\par 1. No new areas of abnormal increased uptake is seen.\par 2. Previously seen abnormal increased uptake seen in the right fifth rib and \par L5\par vertebral body on the left side, at this time both regions are non-FDG avid.\par 3. Multiple extensive lytic lesions in the skeletal consistent with multiple\par myeloma.\par 4. No other areas of abnormal increased uptake is seen.\par I the attending attest that I have personally reviewed these images and agree\par with this report.\par \par  \par

## 2018-10-10 NOTE — ASSESSMENT
[FreeTextEntry1] : 1.Free light chain multiple myeloma, specifically lambda.  Initial beta-2 was 27.1.  His bone marrow cytogenetics was normal.  He is status post four cycles of VCD.  This is followed by autologous bone marrow transplant in 07/2015,he has been on  weekly Velcade maintenance, He is also being followed by Dr. Krysta Rod in [place].  He currently takes his acyclovir.  Last myeloma panel  showed increasing lambda light chains but marrow showed minimal plasma cells with PET CT no progression.  HIs lambda continues to increase supporting biochemical recurrence with worsening creatinine  He was started on Daratumumab, Revlimid 5 mg daily d1-21 and  Dexamethasone  40 mg weekly PO with great response and is nos most likley in CR. Patient was taking Revilimid every other day and it was held for pancytopenia he is now on Revlamid 2.5 mg every other day d1-21 every 28 days takes 10 tabs.. 6/18/18 went on Revlimid maintenance. Achieved a VGPR.\par \par 2.Chronic kidney disease from multiple myeloma.  He has not been on dialysis in the past but is no off.  The creatinine has been relatively stable.  He follows up with Dr. Allen.\par \par 3.Normocytic anemia, related to chronic kidney disease and Revlimid Was on  Procrit  60,000 units every  week and s/p IV iron will keep saturation over 20% and Ferritin over 100. . His HGB is over 10 off Procrit\par \par 4. Moderate to severe   thrombocytopenia most likely from Revlimid. Resolved\par \par \par \par PLAN:\par -free light chains are  in the normal/expected range due to CKD.  In VGPR maybe CR  Myeloma panel stable will recheck.\par -On  Revlimid maintenance.Revlimid 2.5mg every other day  21 days cycle of 28 \par -anemia HgB now  over 10 for over on month will hold Procrit and monitor  cbc\par -once MM is active again will use Xgeva and will obtain skeletal images \par -RTC in one month\par - d Shingle vaccine on hold we only have the live one, we should get the inactivated one   Hopefully negxt month\par \par

## 2018-10-10 NOTE — REVIEW OF SYSTEMS
[Negative] : Allergic/Immunologic [Fatigue] : no fatigue [Dry Eyes] : no dryness of the eyes [Dysphagia] : no dysphagia [Odynophagia] : no odynophagia [Mucosal Pain] : no mucosal pain [Leg Claudication] : no intermittent leg claudication [Lower Ext Edema] : no lower extremity edema [SOB on Exertion] : no shortness of breath during exertion [Muscle Pain] : no muscle pain

## 2018-10-11 ENCOUNTER — LABORATORY RESULT (OUTPATIENT)
Age: 68
End: 2018-10-11

## 2018-10-11 LAB
ALBUMIN SERPL ELPH-MCNC: 4.1 G/DL
ALP BLD-CCNC: 58 U/L
ALT SERPL-CCNC: 8 U/L
ANION GAP SERPL CALC-SCNC: 20 MMOL/L
AST SERPL-CCNC: 10 U/L
BILIRUB SERPL-MCNC: <0.2 MG/DL
BUN SERPL-MCNC: 77 MG/DL
CALCIUM SERPL-MCNC: 10.1 MG/DL
CALCIUM SERPL-MCNC: 10.4 MG/DL
CHLORIDE SERPL-SCNC: 99 MMOL/L
CO2 SERPL-SCNC: 20 MMOL/L
CREAT SERPL-MCNC: 6.1 MG/DL
GLUCOSE SERPL-MCNC: 90 MG/DL
HCT VFR BLD CALC: 30 %
HGB BLD-MCNC: 9.3 G/DL
MCHC RBC-ENTMCNC: 23.8 PG
MCHC RBC-ENTMCNC: 31 G/DL
MCV RBC AUTO: 76.7 FL
PARATHYROID HORMONE INTACT: 29 PG/ML
PLATELET # BLD AUTO: 231 K/UL
PMV BLD: 9.6 FL
POTASSIUM SERPL-SCNC: 4.5 MMOL/L
PROT SERPL-MCNC: 6 G/DL
RBC # BLD: 3.91 M/UL
RBC # FLD: 20.6 %
SODIUM SERPL-SCNC: 139 MMOL/L
WBC # FLD AUTO: 7.01 K/UL

## 2018-10-12 LAB
ALBUMIN MFR SERPL ELPH: 59.8 %
ALBUMIN SERPL-MCNC: 3.7 G/DL
ALBUMIN/GLOB SERPL: 1.5 RATIO
ALPHA1 GLOB MFR SERPL ELPH: 6.8 %
ALPHA1 GLOB SERPL ELPH-MCNC: 0.4 G/DL
ALPHA2 GLOB MFR SERPL ELPH: 15.4 %
ALPHA2 GLOB SERPL ELPH-MCNC: 1 G/DL
B-GLOBULIN MFR SERPL ELPH: 10.6 %
B-GLOBULIN SERPL ELPH-MCNC: 0.7 G/DL
CREAT SPEC-SCNC: 62 MG/DL
CREAT/PROT UR: 0.1 RATIO
DEPRECATED KAPPA LC FREE/LAMBDA SER: 1.05 RATIO
GAMMA GLOB FLD ELPH-MCNC: 0.5 G/DL
GAMMA GLOB MFR SERPL ELPH: 7.4 %
IGA SER QL IEP: 108 MG/DL
IGG SER QL IEP: 424 MG/DL
IGM SER QL IEP: 23 MG/DL
INTERPRETATION SERPL IEP-IMP: NORMAL
KAPPA LC CSF-MCNC: 2.83 MG/DL
KAPPA LC SERPL-MCNC: 2.98 MG/DL
M PROTEIN SPEC IFE-MCNC: NORMAL
PROT SERPL-MCNC: 6.2 G/DL
PROT SERPL-MCNC: 6.2 G/DL
PROT UR-MCNC: 5 MG/DLG/24H

## 2018-10-15 ENCOUNTER — APPOINTMENT (OUTPATIENT)
Dept: INFUSION THERAPY | Facility: CLINIC | Age: 68
End: 2018-10-15

## 2018-10-25 ENCOUNTER — RX RENEWAL (OUTPATIENT)
Age: 68
End: 2018-10-25

## 2018-11-07 ENCOUNTER — APPOINTMENT (OUTPATIENT)
Dept: HEMATOLOGY ONCOLOGY | Facility: CLINIC | Age: 68
End: 2018-11-07

## 2018-11-07 ENCOUNTER — LABORATORY RESULT (OUTPATIENT)
Age: 68
End: 2018-11-07

## 2018-11-07 ENCOUNTER — APPOINTMENT (OUTPATIENT)
Dept: INFUSION THERAPY | Facility: CLINIC | Age: 68
End: 2018-11-07

## 2018-11-07 VITALS
DIASTOLIC BLOOD PRESSURE: 70 MMHG | SYSTOLIC BLOOD PRESSURE: 130 MMHG | HEIGHT: 66 IN | HEART RATE: 78 BPM | WEIGHT: 158 LBS | RESPIRATION RATE: 16 BRPM | BODY MASS INDEX: 25.39 KG/M2 | TEMPERATURE: 97.9 F

## 2018-11-07 LAB
ALBUMIN SERPL ELPH-MCNC: 4 G/DL
ALP BLD-CCNC: 65 U/L
ALT SERPL-CCNC: 9 U/L
ANION GAP SERPL CALC-SCNC: 19 MMOL/L
AST SERPL-CCNC: 8 U/L
BILIRUB SERPL-MCNC: <0.2 MG/DL
BUN SERPL-MCNC: 73 MG/DL
CALCIUM SERPL-MCNC: 10.2 MG/DL
CHLORIDE SERPL-SCNC: 100 MMOL/L
CO2 SERPL-SCNC: 20 MMOL/L
CREAT SERPL-MCNC: 5.7 MG/DL
GLUCOSE SERPL-MCNC: 88 MG/DL
HCT VFR BLD CALC: 29.2 %
HGB BLD-MCNC: 9.1 G/DL
IRON SATN MFR SERPL: 18 %
IRON SERPL-MCNC: 38 UG/DL
MCHC RBC-ENTMCNC: 23.8 PG
MCHC RBC-ENTMCNC: 31.2 G/DL
MCV RBC AUTO: 76.4 FL
PLATELET # BLD AUTO: 238 K/UL
PMV BLD: 10 FL
POTASSIUM SERPL-SCNC: 4.4 MMOL/L
PROT SERPL-MCNC: 6.6 G/DL
RBC # BLD: 3.82 M/UL
RBC # FLD: 21.8 %
SODIUM SERPL-SCNC: 139 MMOL/L
TIBC SERPL-MCNC: 212 UG/DL
UIBC SERPL-MCNC: 174 UG/DL
WBC # FLD AUTO: 8.11 K/UL

## 2018-11-07 RX ORDER — ERYTHROPOIETIN 10000 [IU]/ML
60000 INJECTION, SOLUTION INTRAVENOUS; SUBCUTANEOUS ONCE
Qty: 0 | Refills: 0 | Status: COMPLETED | OUTPATIENT
Start: 2018-11-07 | End: 2018-11-07

## 2018-11-07 RX ADMIN — ERYTHROPOIETIN 60000 UNIT(S): 10000 INJECTION, SOLUTION INTRAVENOUS; SUBCUTANEOUS at 09:26

## 2018-11-07 NOTE — ASSESSMENT
[FreeTextEntry1] : 1.Free light chain multiple myeloma, specifically lambda.  Initial beta-2 was 27.1.  His bone marrow cytogenetics was normal.  He is status post four cycles of VCD.  This is followed by autologous bone marrow transplant in 07/2015,he has been on  weekly Velcade maintenance, He is also being followed by Dr. Krysta Rod in [place].  He currently takes his acyclovir.  Last myeloma panel  showed increasing lambda light chains but marrow showed minimal plasma cells with PET CT no progression.  HIs lambda continues to increase supporting biochemical recurrence with worsening creatinine  He was started on Daratumumab, Revlimid 5 mg daily d1-21 and  Dexamethasone  40 mg weekly PO with great response and is nos most likley in CR. Patient was taking Revilimid every other day and it was held for pancytopenia he is now on Revlamid 2.5 mg every other day d1-21 every 28 days takes 10 tabs.. 6/18/18 went on Revlimid maintenance. Achieved a VGPR/CR\par \par 2.Chronic kidney disease from multiple myeloma.  He has not been on dialysis in the past but is no off.  The creatinine has been relatively stable.  He follows up with Dr. Allen.\par \par 3.Normocytic anemia, related to chronic kidney disease and Revlimid Was on  Procrit  60,000 units every  week and s/p IV iron will keep saturation over 20% and Ferritin over 100. . His HGB under  10 off Procrit will restart\par \par 4. Moderate to severe   thrombocytopenia most likely from Revlimid. Resolved\par \par \par \par PLAN:\par -free light chains are  in the normal/expected range due to CKD.  In VGPR maybe CR  since LUCIA is negative , Myeloma panel stable will recheck.\par -On  Revlimid maintenance.Revlimid 2.5mg every other day  21 days cycle of 28 days\par -will restart Procrit 60,000 units every 2 weeks will check [employer]\par -once MM is active again will use Xgeva and will obtain skeletal images \par -RTC in one month\par -  Shingle vaccine on hold we only have the live one, we should get the inactivated one  soon\par \par

## 2018-11-07 NOTE — HISTORY OF PRESENT ILLNESS
[Therapy: ___] : Therapy: [unfilled] [de-identified] : REASON FOR FOLLOWUP:  Maintenance Velcade for lambda light chain myeloma, status post autologous stem cell transplant.\par \par TREATMENT HISTORY:  On 05/02/2015, he was diagnosed with lambda light chain multiple myeloma when he had a syncopal episode in [place] and was found to be in acute renal failure.  He underwent a bone marrow biopsy that confirmed lambda light chain multiple myeloma.  His beta2 microglobulin was initially 27.1 on diagnosis.  His lambda light chain prior to treatment was as high as 89522.2 on 02/27/2015.  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  There was also diffuse acute tubular injury and modular tubular atrophy with interstitial fibrosis initially on dialysis, but currently been off dialysis for approximately one year.\par \par His treatment summary is as follows.  He initiated Velcade, Cytoxan, and dexamethasone, cycle started on 03/13/2015.  He completed four cycles on 05/08/2015.  He then underwent an autologous bone marrow transplant in 07/2015 at [place].  He has been on maintenance Velcade but in 6/17 was started on DRD due to  rising lambda with normal kappa.\par \par Mr. Motta is a very pleasant 66yearold gentleman with history of lambda light chain multiple myeloma, treated as above.\par  [FreeTextEntry1] : DRD started  on 6/21/17 vesta on Revlimid maintenance 6/18/18 [de-identified] : He presents today for followup she is currently on maintenance Velcade.his blood work from October 27 demonstrated a stage a faint lambda band on urine immunofixation. Nomi a count 7.66 hemoglobin 8.3 platelet count of 132. The SPEP was normal. Creatinine was stable at 5.93 potassium was 5.2 total protein 5.9 unremarkable LFTs free kappa lambda ratio normal 1.27 with free kappa at 42 and free lambda at 33.1. Most recent blood work from 1110 2016 shows a hemoglobin of 7.8. \par \par he was recently seen by Dr. Rod in  [place]. He had blood work done and reports that everything was stable. Dr. Rod also recommended to decrease his Velcade dose to 1.3 mg/m2\par \par He has no complaints. He denies any neuropathy.\par \par 1/4/17\par Presents for follow up today. Doing well on Maintenance Velcade.  No complaints. No neuropathy. \par \par 2/1/17\par He presents for follow up today. He has no complaints. Tolerating Velcade well.\par \par 4/5/17\par Doing well No complaints blood work from last visit was stable.\par \par 5/1/17\par He presents for follow up. He had increasing lambda light chains. Repeat was stable. A bone marrow showed minimal plasma cells and PET CT did not show any new activity.  He has no complaints.\par \par 6/5/2017\par Presents for follow up. He is doing well. He has some fatigue. His  Lambda has been slowly increasing. He has no other complaints.  No neuropathy\par \par 6/12/17\par He was brought back to discuss his lab work. His Lambda has been increasing with stable Kappa as well as slight worsening of his of creatinine He has no complaints.\par \par 7/12/17\par He has been on DRD. His HgB dropped to 5.9 and he is s/p PRBC transfusion.  He finished Revlimid on 7/9. His only complaint is leg crams at night. \par \par 8/9/17\par He is here for follow up. His cramps went away with the lower dose of Revlimid. His His lambda is coming down. He saw Dr. Rod last week as per patient.\par \par 8/30/27\par He is doing well. His legs cramps are better. He has severe anemia but he is asymptomatic. HIs creatinine is better and his light chain is at baseline.\par \par 9/27/17\par He is here for follow up. He needed  blood transfusion and hgb has been stable at 8 since than. He is also status post Venofer x2. He is on Procrit\par  40,000 units every 2 weeks. He feels well otherwise. \par \par 11/20/17\par Patient feels well and has no complaints. States he was taking Revlimid 5mg q48hrs prior to stopping for low counts. Patient will get Procrit and Darzalex. Patient will be restarted on Revilimid 2.5mg. Patient will continue every 2week Darzalex until week 25 then it becomes every 4 weeks. \par \par 12/18/17\par He is here for follow up. He is doing well. Blood work from last month still in CR. Cr and counts are better. He has no complaints and feel great.\par \par 1/22/18\par He is here for follow up. He is recovering from a cold. He is taking a prednisone taper given by his PMD. Had mules pain from cough.\par \par 2/26/18\par He is doing well. He has no complaints. His labs have been stable his light chain only went up slightly but ratio is normal. \par \par 3/27/18\par He is here for follow up he is doing well. He had blood work in [facility]  where he saw Dr. Rod 3/26/18 WBC 6.7, HgB 11.3, Pts 140, LFT WNL, , Ca 9.7. Cr. 5.96, alb 3.2\par \par He feels well. \par \par 4/23/18\par No events feels well. No complaints\par \par 5/21/18\par He is here for follow up. Doing well. Has no complaints\par \par 6/18/18\par His hgb has been dropping on monthly Procrit, His ligh chains are normal last Serum LUCIA from april 23 showed IgG kappa band thus he achieved aVGPR for DRD. He has no complaints.\par \par 7/16/18\par He is here for follow up. Doing well. No complaints. His kitten bit him.\par \par 8/13/18\par He is doing well. No complains. His HGb has been over 11 gm/dl So we are holding his procrit.\par \par 9/10/18\par He is here for follow up. He Myleoma panel is stable. Hgb is stable. He is due for his vaccinations 2 years post Auto and will have them today. He started Revlimid about 1 week ago. He has no complaints.\par \par 10/10/18\par He is here for follow up. He is doing well. BP is a bit high will watch. He has no complaints. He has his revlamid.\par \par 11/7/18\par He is here for follow up. His last hgb was under 10 gm/dl.  He is doing well otherwise. Was started on someithing for his blood pressure by Dr. Allen but he does not know what it is. He has his revlamid

## 2018-11-07 NOTE — RESULTS/DATA
[FreeTextEntry1] :  Result Name Results Units Reference Range \par      PET CT WHOLE BODY TOP OF SKULL TO TI   SEE TEXT       \par     Patient Name: MOR, AUGUST : 1950\par Patient ID: [id number]\par Account: [id number]\par Patient Location: [facility]\par Accession: [id number]\par Procedure: PET CT WHOLE BODY TOP OF SKULL TO TIP OF TOES SUBSEQUENT 250-0036\par Date of Exam: 2017 11:09 AM\par Attending Physician: JARVIS LOW\par Requesting Physician: JARVIS LOW MD\par Clinical History / Reason for exam: FDG PET CT STUDY:\par Reason for examination: Tumor imaging - PET with concurrently acquired CT for\par attenuation correction and anatomic localization;  top of the skull  to toes/\par [address] multiple myeloma, subsequent treatment strategy.\par ICD-10 code:C90.0\par History: 66-year-old male patient with history of multiple myeloma, last\par chemotherapy received was in 2017. Patient had bone biopsy on\par 2017.\par Blood glucose pre injection 102 mg/dL\par Technique:\par Approximately 45 minutes after the intravenous administration of 13.4  mCi\par 18-Fluorine FDG, whole body PET images were acquired from top of  the skull to\par toes. In addition, non-iv and non oral contrast, low dose, non - diagnostic CT\par was acquired for attenuation correction and anatomic correlation only.\par Findings:\par Comparison: Prior PET CT study done on July 3, 2015. Correlation was performed\par with the skeletal survey done on 2016.\par Head /Neck: Physiologic FDG uptake is seen in the visualized region of the\par brain, pharyngeal lymphoid tissue, and salivary glands.\par Chest:\par Physiologic FDG avid PET is seen in the mediastinal blood pool and myocardium.\par Chest wall: Unremarkable\par Lungs: No abnormal uptake.\par Mediastinum/Pleura/pericardium: No abnormal uptake.\par Thoracic/ axillary lymph nodes: No abnormal uptake.\par Hepatobiliary: Unremarkable.\par Gallbladder: Multiple gallstones.\par Spleen: No abnormal uptake\par Pancreas: No abnormal uptake.\par Adrenal glands: No abnormal uptake.\par Kidneys/ureters/bladder: Normal excretory activity is demonstrated.\par Abdominal pelvic lymph nodes: No abnormal uptake.\par Bowel/peritoneum/mesentery: No abnormal uptake.\par Pelvic organs: Unremarkable. No abnormal increased uptake. Prosthetic\par calcifications.\par Bones/soft tissues: Osteoarthritic changes are identified in the bones. \par Patient\par has multiple extensive lytic lesions in the skeletal, seen in the skeletal\par survey  for details see the report  on 2016.\par In the prior examination there was PET positive lytic lesion right fifth rib\par with a max SUV 2.6, now non-FDG avid   and L5 vertebral body on the left side\par with a max SUV 3.8, now non-FDG avid with multiple other scattered lytic\par lesions, non-FDG avid.\par No abnormal increased uptake is seen in the lower extremities.\par Impression:\par 1. No new areas of abnormal increased uptake is seen.\par 2. Previously seen abnormal increased uptake seen in the right fifth rib and \par L5\par vertebral body on the left side, at this time both regions are non-FDG avid.\par 3. Multiple extensive lytic lesions in the skeletal consistent with multiple\par myeloma.\par 4. No other areas of abnormal increased uptake is seen.\par I the attending attest that I have personally reviewed these images and agree\par with this report.\par \par  \par

## 2018-11-08 LAB — FERRITIN SERPL-MCNC: 226 NG/ML

## 2018-11-12 LAB
ALBUMIN MFR SERPL ELPH: 59.9 %
ALBUMIN SERPL-MCNC: 3.8 G/DL
ALBUMIN/GLOB SERPL: 1.5 RATIO
ALPHA1 GLOB MFR SERPL ELPH: 6.5 %
ALPHA1 GLOB SERPL ELPH-MCNC: 0.4 G/DL
ALPHA2 GLOB MFR SERPL ELPH: 14.6 %
ALPHA2 GLOB SERPL ELPH-MCNC: 0.9 G/DL
B-GLOBULIN MFR SERPL ELPH: 10 %
B-GLOBULIN SERPL ELPH-MCNC: 0.6 G/DL
DEPRECATED KAPPA LC FREE/LAMBDA SER: 1.18 RATIO
GAMMA GLOB FLD ELPH-MCNC: 0.6 G/DL
GAMMA GLOB MFR SERPL ELPH: 9 %
IGA SER QL IEP: 111 MG/DL
IGG SER QL IEP: 587 MG/DL
IGM SER QL IEP: 28 MG/DL
INTERPRETATION SERPL IEP-IMP: NORMAL
KAPPA LC CSF-MCNC: 3.2 MG/DL
KAPPA LC SERPL-MCNC: 3.79 MG/DL
M PROTEIN SPEC IFE-MCNC: NORMAL
PROT SERPL-MCNC: 6.4 G/DL
PROT SERPL-MCNC: 6.4 G/DL

## 2018-11-26 ENCOUNTER — RX RENEWAL (OUTPATIENT)
Age: 68
End: 2018-11-26

## 2018-11-29 ENCOUNTER — LABORATORY RESULT (OUTPATIENT)
Age: 68
End: 2018-11-29

## 2018-11-29 ENCOUNTER — APPOINTMENT (OUTPATIENT)
Dept: INFUSION THERAPY | Facility: CLINIC | Age: 68
End: 2018-11-29

## 2018-11-29 LAB
HCT VFR BLD CALC: 31.9 %
HGB BLD-MCNC: 9.9 G/DL
MCHC RBC-ENTMCNC: 24.4 PG
MCHC RBC-ENTMCNC: 31 G/DL
MCV RBC AUTO: 78.8 FL
PLATELET # BLD AUTO: 201 K/UL
PMV BLD: 10.6 FL
RBC # BLD: 4.05 M/UL
RBC # FLD: 21.6 %
WBC # FLD AUTO: 9.14 K/UL

## 2018-11-29 RX ORDER — ERYTHROPOIETIN 10000 [IU]/ML
60000 INJECTION, SOLUTION INTRAVENOUS; SUBCUTANEOUS ONCE
Qty: 0 | Refills: 0 | Status: COMPLETED | OUTPATIENT
Start: 2018-11-29 | End: 2018-11-29

## 2018-11-29 RX ADMIN — ERYTHROPOIETIN 40000 UNIT(S): 10000 INJECTION, SOLUTION INTRAVENOUS; SUBCUTANEOUS at 09:43

## 2018-12-05 ENCOUNTER — APPOINTMENT (OUTPATIENT)
Dept: INFUSION THERAPY | Facility: CLINIC | Age: 68
End: 2018-12-05

## 2018-12-05 ENCOUNTER — APPOINTMENT (OUTPATIENT)
Dept: HEMATOLOGY ONCOLOGY | Facility: CLINIC | Age: 68
End: 2018-12-05

## 2018-12-10 ENCOUNTER — APPOINTMENT (OUTPATIENT)
Dept: HEMATOLOGY ONCOLOGY | Facility: CLINIC | Age: 68
End: 2018-12-10

## 2018-12-10 ENCOUNTER — LABORATORY RESULT (OUTPATIENT)
Age: 68
End: 2018-12-10

## 2018-12-10 ENCOUNTER — APPOINTMENT (OUTPATIENT)
Dept: INFUSION THERAPY | Facility: CLINIC | Age: 68
End: 2018-12-10

## 2018-12-10 VITALS
HEART RATE: 94 BPM | HEIGHT: 66 IN | DIASTOLIC BLOOD PRESSURE: 83 MMHG | TEMPERATURE: 96.6 F | SYSTOLIC BLOOD PRESSURE: 135 MMHG | BODY MASS INDEX: 24.91 KG/M2 | RESPIRATION RATE: 16 BRPM | WEIGHT: 155 LBS

## 2018-12-10 NOTE — HISTORY OF PRESENT ILLNESS
[Therapy: ___] : Therapy: [unfilled] [de-identified] : REASON FOR FOLLOWUP:  Maintenance Velcade for lambda light chain myeloma, status post autologous stem cell transplant.\par \par TREATMENT HISTORY:  On 05/02/2015, he was diagnosed with lambda light chain multiple myeloma when he had a syncopal episode in [place] and was found to be in acute renal failure.  He underwent a bone marrow biopsy that confirmed lambda light chain multiple myeloma.  His beta2 microglobulin was initially 27.1 on diagnosis.  His lambda light chain prior to treatment was as high as 84278.2 on 02/27/2015.  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  There was also diffuse acute tubular injury and modular tubular atrophy with interstitial fibrosis initially on dialysis, but currently been off dialysis for approximately one year.\par \par His treatment summary is as follows.  He initiated Velcade, Cytoxan, and dexamethasone, cycle started on 03/13/2015.  He completed four cycles on 05/08/2015.  He then underwent an autologous bone marrow transplant in 07/2015 at [place].  He has been on maintenance Velcade but in 6/17 was started on DRD due to  rising lambda with normal kappa.\par \par Mr. Motta is a very pleasant 66yearold gentleman with history of lambda light chain multiple myeloma, treated as above.\par  [FreeTextEntry1] : DRD started  on 6/21/17 went on  Revlimid maintenance 6/18/18 [de-identified] : He presents today for followup she is currently on maintenance Velcade.his blood work from October 27 demonstrated a stage a faint lambda band on urine immunofixation. Nomi a count 7.66 hemoglobin 8.3 platelet count of 132. The SPEP was normal. Creatinine was stable at 5.93 potassium was 5.2 total protein 5.9 unremarkable LFTs free kappa lambda ratio normal 1.27 with free kappa at 42 and free lambda at 33.1. Most recent blood work from 1110 2016 shows a hemoglobin of 7.8. \par \par he was recently seen by Dr. Rod in  [place]. He had blood work done and reports that everything was stable. Dr. Rod also recommended to decrease his Velcade dose to 1.3 mg/m2\par \par He has no complaints. He denies any neuropathy.\par \par 1/4/17\par Presents for follow up today. Doing well on Maintenance Velcade.  No complaints. No neuropathy. \par \par 2/1/17\par He presents for follow up today. He has no complaints. Tolerating Velcade well.\par \par 4/5/17\par Doing well No complaints blood work from last visit was stable.\par \par 5/1/17\par He presents for follow up. He had increasing lambda light chains. Repeat was stable. A bone marrow showed minimal plasma cells and PET CT did not show any new activity.  He has no complaints.\par \par 6/5/2017\par Presents for follow up. He is doing well. He has some fatigue. His  Lambda has been slowly increasing. He has no other complaints.  No neuropathy\par \par 6/12/17\par He was brought back to discuss his lab work. His Lambda has been increasing with stable Kappa as well as slight worsening of his of creatinine He has no complaints.\par \par 7/12/17\par He has been on DRD. His HgB dropped to 5.9 and he is s/p PRBC transfusion.  He finished Revlimid on 7/9. His only complaint is leg crams at night. \par \par 8/9/17\par He is here for follow up. His cramps went away with the lower dose of Revlimid. His His lambda is coming down. He saw Dr. Rod last week as per patient.\par \par 8/30/27\par He is doing well. His legs cramps are better. He has severe anemia but he is asymptomatic. HIs creatinine is better and his light chain is at baseline.\par \par 9/27/17\par He is here for follow up. He needed  blood transfusion and hgb has been stable at 8 since than. He is also status post Venofer x2. He is on Procrit\par  40,000 units every 2 weeks. He feels well otherwise. \par \par 11/20/17\par Patient feels well and has no complaints. States he was taking Revlimid 5mg q48hrs prior to stopping for low counts. Patient will get Procrit and Darzalex. Patient will be restarted on Revilimid 2.5mg. Patient will continue every 2week Darzalex until week 25 then it becomes every 4 weeks. \par \par 12/18/17\par He is here for follow up. He is doing well. Blood work from last month still in CR. Cr and counts are better. He has no complaints and feel great.\par \par 1/22/18\par He is here for follow up. He is recovering from a cold. He is taking a prednisone taper given by his PMD. Had mules pain from cough.\par \par 2/26/18\par He is doing well. He has no complaints. His labs have been stable his light chain only went up slightly but ratio is normal. \par \par 3/27/18\par He is here for follow up he is doing well. He had blood work in [facility]  where he saw Dr. Rod 3/26/18 WBC 6.7, HgB 11.3, Pts 140, LFT WNL, , Ca 9.7. Cr. 5.96, alb 3.2\par \par He feels well. \par \par 4/23/18\par No events feels well. No complaints\par \par 5/21/18\par He is here for follow up. Doing well. Has no complaints\par \par 6/18/18\par His hgb has been dropping on monthly Procrit, His ligh chains are normal last Serum LUCIA from april 23 showed IgG kappa band thus he achieved aVGPR for DRD. He has no complaints.\par \par 7/16/18\par He is here for follow up. Doing well. No complaints. His kitten bit him.\par \par 8/13/18\par He is doing well. No complains. His HGb has been over 11 gm/dl So we are holding his procrit.\par \par 9/10/18\par He is here for follow up. He Myleoma panel is stable. Hgb is stable. He is due for his vaccinations 2 years post Auto and will have them today. He started Revlimid about 1 week ago. He has no complaints.\par \par 10/10/18\par He is here for follow up. He is doing well. BP is a bit high will watch. He has no complaints. He has his revlamid.\par \par 11/7/18\par He is here for follow up. His last hgb was under 10 gm/dl.  He is doing well otherwise. Was started on someithing for his blood pressure by Dr. Allen but he does not know what it is. He has his Revlimid\par \par 12/10/18\par Patient is here for a follow-up visit.  He is feeling well with no complaints.  Most recent CBC is stable.  Patient denies fever, chills, nausea, vomiting.  The light chains are picking up but it may be related to the CKD since LUCIA is negative and ratio is normal.

## 2018-12-10 NOTE — ASSESSMENT
[FreeTextEntry1] : 1.Free light chain multiple myeloma, specifically lambda.  Initial beta-2 was 27.1.  His bone marrow cytogenetics was normal.  He is status post four cycles of VCD.  This is followed by autologous bone marrow transplant in 07/2015,he has been on  weekly Velcade maintenance, He is also being followed by Dr. Krysta Rod in [place].  He currently takes his acyclovir.  Last myeloma panel  showed increasing lambda light chains but marrow showed minimal plasma cells with PET CT no progression.  HIs lambda continues to increase supporting biochemical recurrence with worsening creatinine  He was started on Daratumumab, Revlimid 5 mg daily d1-21 and  Dexamethasone  40 mg weekly PO with great response and is nos most likley in CR. Patient was taking Revilimid every other day and it was held for pancytopenia he is now on Revlamid 2.5 mg every other day d1-21 every 28 days takes 10 tabs.. 6/18/18 went on Revlimid maintenance. Achieved a VGPR/CR\par \par 2.Chronic kidney disease from multiple myeloma.  He has not been on dialysis in the past but is no off.  The creatinine has been relatively stable.  He follows up with Dr. Allen.\par \par 3.Normocytic anemia, related to chronic kidney disease and Revlimid Was on  Procrit  60,000 units every  week and s/p IV iron will keep saturation over 20% and Ferritin over 100. . His HGB under  10 back on Procrit.\par \par 4. Moderate to severe   thrombocytopenia most likely from Revlimid. Resolved\par \par \par \par PLAN:\par -free light chains are  in the normal/expected range due to CKD.  In VGPR maybe CR  since LUCIA is negative , Myeloma panel stable will recheck\par -On  Revlimid maintenance.Revlimid 2.5mg every other day  21 days cycle of 28 days\par -On  Procrit 60,000 units every 2 weeks, will dose Procrit today\par -once MM is active again will use Xgeva and will obtain skeletal images \par -  Shingle vaccine on hold we only have the live one, we should get the Recombinant one soon \par \par RTC in 1 month

## 2018-12-10 NOTE — RESULTS/DATA
[FreeTextEntry1] :  Result Name Results Units Reference Range \par      PET CT WHOLE BODY TOP OF SKULL TO TI   SEE TEXT       \par     Patient Name: MOR, AUGUST : 1950\par Patient ID: [id number]\par Account: [id number]\par Patient Location: [facility]\par Accession: [id number]\par Procedure: PET CT WHOLE BODY TOP OF SKULL TO TIP OF TOES SUBSEQUENT [id number]\par Date of Exam: 2017 11:09 AM\par Attending Physician: JARVIS LOW\par Requesting Physician: JARVIS LOW MD\par Clinical History / Reason for exam: FDG PET CT STUDY:\par Reason for examination: Tumor imaging - PET with concurrently acquired CT for\par attenuation correction and anatomic localization;  top of the skull  to toes/\par 39519 multiple myeloma, subsequent treatment strategy.\par ICD-10 code:C90.0\par History: 66-year-old male patient with history of multiple myeloma, last\par chemotherapy received was in 2017. Patient had bone biopsy on\par 2017.\par Blood glucose pre injection 102 mg/dL\par Technique:\par Approximately 45 minutes after the intravenous administration of 13.4  mCi\par 18-Fluorine FDG, whole body PET images were acquired from top of  the skull to\par toes. In addition, non-iv and non oral contrast, low dose, non - diagnostic CT\par was acquired for attenuation correction and anatomic correlation only.\par Findings:\par Comparison: Prior PET CT study done on July 3, 2015. Correlation was performed\par with the skeletal survey done on 2016.\par Head /Neck: Physiologic FDG uptake is seen in the visualized region of the\par brain, pharyngeal lymphoid tissue, and salivary glands.\par Chest:\par Physiologic FDG avid PET is seen in the mediastinal blood pool and myocardium.\par Chest wall: Unremarkable\par Lungs: No abnormal uptake.\par Mediastinum/Pleura/pericardium: No abnormal uptake.\par Thoracic/ axillary lymph nodes: No abnormal uptake.\par Hepatobiliary: Unremarkable.\par Gallbladder: Multiple gallstones.\par Spleen: No abnormal uptake\par Pancreas: No abnormal uptake.\par Adrenal glands: No abnormal uptake.\par Kidneys/ureters/bladder: Normal excretory activity is demonstrated.\par Abdominal pelvic lymph nodes: No abnormal uptake.\par Bowel/peritoneum/mesentery: No abnormal uptake.\par Pelvic organs: Unremarkable. No abnormal increased uptake. Prosthetic\par calcifications.\par Bones/soft tissues: Osteoarthritic changes are identified in the bones. \par Patient\par has multiple extensive lytic lesions in the skeletal, seen in the skeletal\par survey  for details see the report  on 2016.\par In the prior examination there was PET positive lytic lesion right fifth rib\par with a max SUV 2.6, now non-FDG avid   and L5 vertebral body on the left side\par with a max SUV 3.8, now non-FDG avid with multiple other scattered lytic\par lesions, non-FDG avid.\par No abnormal increased uptake is seen in the lower extremities.\par Impression:\par 1. No new areas of abnormal increased uptake is seen.\par 2. Previously seen abnormal increased uptake seen in the right fifth rib and \par L5\par vertebral body on the left side, at this time both regions are non-FDG avid.\par 3. Multiple extensive lytic lesions in the skeletal consistent with multiple\par myeloma.\par 4. No other areas of abnormal increased uptake is seen.\par I the attending attest that I have personally reviewed these images and agree\par with this report.\par \par  \par

## 2018-12-11 LAB
ALBUMIN SERPL ELPH-MCNC: 4.4 G/DL
ALP BLD-CCNC: 56 U/L
ALT SERPL-CCNC: 10 U/L
ANION GAP SERPL CALC-SCNC: 22 MMOL/L
AST SERPL-CCNC: 11 U/L
BILIRUB SERPL-MCNC: <0.2 MG/DL
BUN SERPL-MCNC: 66 MG/DL
CALCIUM SERPL-MCNC: 10.2 MG/DL
CHLORIDE SERPL-SCNC: 96 MMOL/L
CO2 SERPL-SCNC: 21 MMOL/L
CREAT SERPL-MCNC: 6.6 MG/DL
GLUCOSE SERPL-MCNC: 107 MG/DL
HCT VFR BLD CALC: 34.9 %
HGB BLD-MCNC: 10.8 G/DL
MCHC RBC-ENTMCNC: 24.3 PG
MCHC RBC-ENTMCNC: 30.9 G/DL
MCV RBC AUTO: 78.4 FL
PLATELET # BLD AUTO: 192 K/UL
PMV BLD: 9.8 FL
POTASSIUM SERPL-SCNC: 4.5 MMOL/L
PROT SERPL-MCNC: 6.9 G/DL
RBC # BLD: 4.45 M/UL
RBC # FLD: 21.2 %
SODIUM SERPL-SCNC: 139 MMOL/L
WBC # FLD AUTO: 7.26 K/UL

## 2018-12-13 LAB
ALBUMIN MFR SERPL ELPH: 55.4 %
ALBUMIN SERPL-MCNC: 3.9 G/DL
ALBUMIN/GLOB SERPL: 1.2 RATIO
ALPHA1 GLOB MFR SERPL ELPH: 7.7 %
ALPHA1 GLOB SERPL ELPH-MCNC: 0.5 G/DL
ALPHA2 GLOB MFR SERPL ELPH: 16.3 %
ALPHA2 GLOB SERPL ELPH-MCNC: 1.2 G/DL
B-GLOBULIN MFR SERPL ELPH: 10.6 %
B-GLOBULIN SERPL ELPH-MCNC: 0.8 G/DL
DEPRECATED KAPPA LC FREE/LAMBDA SER: 1.5 RATIO
GAMMA GLOB FLD ELPH-MCNC: 0.7 G/DL
GAMMA GLOB MFR SERPL ELPH: 10 %
IGA SER QL IEP: 149 MG/DL
IGG SER QL IEP: 866 MG/DL
IGM SER QL IEP: 40 MG/DL
INTERPRETATION SERPL IEP-IMP: NORMAL
KAPPA LC CSF-MCNC: 6.2 MG/DL
KAPPA LC SERPL-MCNC: 9.27 MG/DL
M PROTEIN SPEC IFE-MCNC: NORMAL
PROT SERPL-MCNC: 7.1 G/DL
PROT SERPL-MCNC: 7.1 G/DL

## 2018-12-17 ENCOUNTER — RX RENEWAL (OUTPATIENT)
Age: 68
End: 2018-12-17

## 2018-12-19 ENCOUNTER — APPOINTMENT (OUTPATIENT)
Dept: INFUSION THERAPY | Facility: CLINIC | Age: 68
End: 2018-12-19

## 2018-12-24 ENCOUNTER — APPOINTMENT (OUTPATIENT)
Dept: INFUSION THERAPY | Facility: CLINIC | Age: 68
End: 2018-12-24

## 2018-12-24 ENCOUNTER — LABORATORY RESULT (OUTPATIENT)
Age: 68
End: 2018-12-24

## 2018-12-24 ENCOUNTER — OUTPATIENT (OUTPATIENT)
Dept: OUTPATIENT SERVICES | Facility: HOSPITAL | Age: 68
LOS: 1 days | Discharge: HOME | End: 2018-12-24

## 2018-12-24 DIAGNOSIS — C90.01 MULTIPLE MYELOMA IN REMISSION: ICD-10-CM

## 2018-12-24 RX ORDER — ERYTHROPOIETIN 10000 [IU]/ML
60000 INJECTION, SOLUTION INTRAVENOUS; SUBCUTANEOUS ONCE
Qty: 0 | Refills: 0 | Status: COMPLETED | OUTPATIENT
Start: 2018-12-24 | End: 2018-12-24

## 2018-12-24 RX ADMIN — ERYTHROPOIETIN 60000 UNIT(S): 10000 INJECTION, SOLUTION INTRAVENOUS; SUBCUTANEOUS at 10:30

## 2018-12-25 LAB
HCT VFR BLD CALC: 32.7 %
HGB BLD-MCNC: 10.2 G/DL
MCHC RBC-ENTMCNC: 24.5 PG
MCHC RBC-ENTMCNC: 31.2 G/DL
MCV RBC AUTO: 78.4 FL
PLATELET # BLD AUTO: 157 K/UL
PMV BLD: 10.6 FL
RBC # BLD: 4.17 M/UL
RBC # FLD: 19.9 %
WBC # FLD AUTO: 9.97 K/UL

## 2019-01-02 ENCOUNTER — APPOINTMENT (OUTPATIENT)
Dept: INFUSION THERAPY | Facility: CLINIC | Age: 69
End: 2019-01-02

## 2019-01-07 ENCOUNTER — APPOINTMENT (OUTPATIENT)
Dept: INFUSION THERAPY | Facility: CLINIC | Age: 69
End: 2019-01-07

## 2019-01-21 ENCOUNTER — APPOINTMENT (OUTPATIENT)
Dept: HEMATOLOGY ONCOLOGY | Facility: CLINIC | Age: 69
End: 2019-01-21

## 2019-01-21 ENCOUNTER — APPOINTMENT (OUTPATIENT)
Dept: INFUSION THERAPY | Facility: CLINIC | Age: 69
End: 2019-01-21

## 2019-01-21 ENCOUNTER — LABORATORY RESULT (OUTPATIENT)
Age: 69
End: 2019-01-21

## 2019-01-21 ENCOUNTER — RX RENEWAL (OUTPATIENT)
Age: 69
End: 2019-01-21

## 2019-01-21 VITALS
HEIGHT: 66 IN | TEMPERATURE: 97.1 F | WEIGHT: 159 LBS | BODY MASS INDEX: 25.55 KG/M2 | RESPIRATION RATE: 16 BRPM | HEART RATE: 96 BPM | DIASTOLIC BLOOD PRESSURE: 84 MMHG | SYSTOLIC BLOOD PRESSURE: 158 MMHG

## 2019-01-21 LAB
DEPRECATED KAPPA LC FREE/LAMBDA SER: 1.63 RATIO
HCT VFR BLD CALC: 34.5 %
HGB BLD-MCNC: 10.6 G/DL
KAPPA LC CSF-MCNC: 6.35 MG/DL
KAPPA LC SERPL-MCNC: 10.37 MG/DL
MCHC RBC-ENTMCNC: 23.9 PG
MCHC RBC-ENTMCNC: 30.7 G/DL
MCV RBC AUTO: 77.9 FL
PLATELET # BLD AUTO: 162 K/UL
PMV BLD: 10.5 FL
RBC # BLD: 4.43 M/UL
RBC # FLD: 20.2 %
WBC # FLD AUTO: 5.93 K/UL

## 2019-01-21 RX ORDER — AMOXICILLIN AND CLAVULANATE POTASSIUM 875; 125 MG/1; MG/1
875-125 TABLET, COATED ORAL
Qty: 20 | Refills: 0 | Status: COMPLETED | COMMUNITY
Start: 2018-12-06

## 2019-01-21 RX ORDER — LENALIDOMIDE 5 MG/1
5 CAPSULE ORAL DAILY
Qty: 21 | Refills: 0 | Status: COMPLETED | COMMUNITY
Start: 2017-08-10 | End: 2019-01-21

## 2019-01-21 RX ORDER — ERYTHROPOIETIN 10000 [IU]/ML
60000 INJECTION, SOLUTION INTRAVENOUS; SUBCUTANEOUS ONCE
Qty: 0 | Refills: 0 | Status: COMPLETED | OUTPATIENT
Start: 2019-01-21 | End: 2019-01-21

## 2019-01-21 RX ADMIN — ERYTHROPOIETIN 60000 UNIT(S): 10000 INJECTION, SOLUTION INTRAVENOUS; SUBCUTANEOUS at 09:28

## 2019-01-21 NOTE — ASSESSMENT
[FreeTextEntry1] : 1.Free light chain multiple myeloma, specifically lambda.  Initial beta-2 was 27.1.  His bone marrow cytogenetics was normal.  He is status post four cycles of VCD.  This is followed by autologous bone marrow transplant in 07/2015,he has been on  weekly Velcade maintenance, He is also being followed by Dr. Krysta Rod in [place].  He currently takes his acyclovir.  Last myeloma panel  showed increasing lambda light chains but marrow showed minimal plasma cells with PET CT no progression.  HIs lambda continues to increase supporting biochemical recurrence with worsening creatinine  He was started on Daratumumab, Revlimid 5 mg daily d1-21 and  Dexamethasone  40 mg weekly PO with great response and is nos most likley in CR. Patient was taking Revilimid every other day and it was held for pancytopenia he is now on Revlamid 2.5 mg every other day d1-21 every 28 days takes 10 tabs.. 6/18/18 went on Revlimid maintenance. Achieved a VGPR/CR\par \par 2.Chronic kidney disease from multiple myeloma.  He has not been on dialysis in the past but is no off.  The creatinine has been relatively stable.  He follows up with Dr. Allen.\par \par 3.Normocytic anemia, related to chronic kidney disease and Revlimid Was on  Procrit  60,000 units every  week and s/p IV iron will keep saturation over 20% and Ferritin over 100. . His HGB under  10 back on Procrit.\par \par 4. Moderate to severe   thrombocytopenia most likely from Revlimid. Resolved\par \par 5. HTN Norvasc was added\par \par \par \par PLAN:\par -free light chains are in the normal/expected range due to CKD.  In VGPR maybe CR  since LUCIA is negative, Myeloma panel stable will recheck\par - On Revlimid maintenance.  c/w Revlimid 2.5mg every other day,  21 days cycle of 28 days\par - On Procrit 60,000 units every 2 weeks, will dose Procrit today if Hgb <11 and SBP <140/80, will recheck BP again\par - once MM is active again will use Xgeva and will obtain skeletal images \par -  Shingle vaccine on hold we only have the live one, we should get the Recombinant one soon \par \par RTC in 1 month

## 2019-01-21 NOTE — HISTORY OF PRESENT ILLNESS
[Therapy: ___] : Therapy: [unfilled] [de-identified] : REASON FOR FOLLOWUP:  Maintenance Velcade for lambda light chain myeloma, status post autologous stem cell transplant.\par \par TREATMENT HISTORY:  On 05/02/2015, he was diagnosed with lambda light chain multiple myeloma when he had a syncopal episode in [place] and was found to be in acute renal failure.  He underwent a bone marrow biopsy that confirmed lambda light chain multiple myeloma.  His beta2 microglobulin was initially 27.1 on diagnosis.  His lambda light chain prior to treatment was as high as 47188.2 on 02/27/2015.  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  There was also diffuse acute tubular injury and modular tubular atrophy with interstitial fibrosis initially on dialysis, but currently been off dialysis for approximately one year.\par \par His treatment summary is as follows.  He initiated Velcade, Cytoxan, and dexamethasone, cycle started on 03/13/2015.  He completed four cycles on 05/08/2015.  He then underwent an autologous bone marrow transplant in 07/2015 at [place].  He has been on maintenance Velcade but in 6/17 was started on DRD due to  rising lambda with normal kappa.\par \par Mr. Motta is a very pleasant 66yearold gentleman with history of lambda light chain multiple myeloma, treated as above.\par  [FreeTextEntry1] : DRD started  on 6/21/17 went on  Revlimid maintenance 6/18/18 [de-identified] : He presents today for followup she is currently on maintenance Velcade.his blood work from October 27 demonstrated a stage a faint lambda band on urine immunofixation. Nomi a count 7.66 hemoglobin 8.3 platelet count of 132. The SPEP was normal. Creatinine was stable at 5.93 potassium was 5.2 total protein 5.9 unremarkable LFTs free kappa lambda ratio normal 1.27 with free kappa at 42 and free lambda at 33.1. Most recent blood work from 1110 2016 shows a hemoglobin of 7.8. \par \par he was recently seen by Dr. Rod in  [place]. He had blood work done and reports that everything was stable. Dr. Rod also recommended to decrease his Velcade dose to 1.3 mg/m2\par \par He has no complaints. He denies any neuropathy.\par \par 1/4/17\par Presents for follow up today. Doing well on Maintenance Velcade.  No complaints. No neuropathy. \par \par 2/1/17\par He presents for follow up today. He has no complaints. Tolerating Velcade well.\par \par 4/5/17\par Doing well No complaints blood work from last visit was stable.\par \par 5/1/17\par He presents for follow up. He had increasing lambda light chains. Repeat was stable. A bone marrow showed minimal plasma cells and PET CT did not show any new activity.  He has no complaints.\par \par 6/5/2017\par Presents for follow up. He is doing well. He has some fatigue. His  Lambda has been slowly increasing. He has no other complaints.  No neuropathy\par \par 6/12/17\par He was brought back to discuss his lab work. His Lambda has been increasing with stable Kappa as well as slight worsening of his of creatinine He has no complaints.\par \par 7/12/17\par He has been on DRD. His HgB dropped to 5.9 and he is s/p PRBC transfusion.  He finished Revlimid on 7/9. His only complaint is leg crams at night. \par \par 8/9/17\par He is here for follow up. His cramps went away with the lower dose of Revlimid. His His lambda is coming down. He saw Dr. Rod last week as per patient.\par \par 8/30/27\par He is doing well. His legs cramps are better. He has severe anemia but he is asymptomatic. HIs creatinine is better and his light chain is at baseline.\par \par 9/27/17\par He is here for follow up. He needed  blood transfusion and hgb has been stable at 8 since than. He is also status post Venofer x2. He is on Procrit\par  40,000 units every 2 weeks. He feels well otherwise. \par \par 11/20/17\par Patient feels well and has no complaints. States he was taking Revlimid 5mg q48hrs prior to stopping for low counts. Patient will get Procrit and Darzalex. Patient will be restarted on Revilimid 2.5mg. Patient will continue every 2week Darzalex until week 25 then it becomes every 4 weeks. \par \par 12/18/17\par He is here for follow up. He is doing well. Blood work from last month still in CR. Cr and counts are better. He has no complaints and feel great.\par \par 1/22/18\par He is here for follow up. He is recovering from a cold. He is taking a prednisone taper given by his PMD. Had mules pain from cough.\par \par 2/26/18\par He is doing well. He has no complaints. His labs have been stable his light chain only went up slightly but ratio is normal. \par \par 3/27/18\par He is here for follow up he is doing well. He had blood work in [facility]  where he saw Dr. Rod 3/26/18 WBC 6.7, HgB 11.3, Pts 140, LFT WNL, , Ca 9.7. Cr. 5.96, alb 3.2\par \par He feels well. \par \par 4/23/18\par No events feels well. No complaints\par \par 5/21/18\par He is here for follow up. Doing well. Has no complaints\par \par 6/18/18\par His hgb has been dropping on monthly Procrit, His ligh chains are normal last Serum LUCIA from april 23 showed IgG kappa band thus he achieved aVGPR for DRD. He has no complaints.\par \par 7/16/18\par He is here for follow up. Doing well. No complaints. His kitten bit him.\par \par 8/13/18\par He is doing well. No complains. His HGb has been over 11 gm/dl So we are holding his procrit.\par \par 9/10/18\par He is here for follow up. He Myleoma panel is stable. Hgb is stable. He is due for his vaccinations 2 years post Auto and will have them today. He started Revlimid about 1 week ago. He has no complaints.\par \par 10/10/18\par He is here for follow up. He is doing well. BP is a bit high will watch. He has no complaints. He has his revlamid.\par \par 11/7/18\par He is here for follow up. His last hgb was under 10 gm/dl.  He is doing well otherwise. Was started on something for his blood pressure by Dr. Allen but he does not know what it is. He has his Revlimid\par \par 12/10/18\par Patient is here for a follow-up visit.  He is feeling well with no complaints.  Most recent CBC is stable.  Patient denies fever, chills, nausea, vomiting.  The light chains are picking up but it may be related to the CKD since LUCIA is negative and ratio is normal.\par \par 1/21/19\par He is here for follow-up with his wife.  His Kappa+Lambda have slightly risen, but the ratio is stable, so this is likely due to his CKD.   Most recent CBC is stable.  He received a prescription for Revlimid 5mg so he has been taking them every other day.  He is otherwise feeling well.  The rest of the CRAB criteria remains the same.

## 2019-01-22 LAB
ALBUMIN SERPL ELPH-MCNC: 4.4 G/DL
ALP BLD-CCNC: 71 U/L
ALT SERPL-CCNC: 7 U/L
ANION GAP SERPL CALC-SCNC: 24 MMOL/L
AST SERPL-CCNC: 10 U/L
BILIRUB SERPL-MCNC: <0.2 MG/DL
BUN SERPL-MCNC: 67 MG/DL
CALCIUM SERPL-MCNC: 10.2 MG/DL
CHLORIDE SERPL-SCNC: 100 MMOL/L
CO2 SERPL-SCNC: 16 MMOL/L
CREAT SERPL-MCNC: 6.1 MG/DL
GLUCOSE SERPL-MCNC: 87 MG/DL
POTASSIUM SERPL-SCNC: 4.1 MMOL/L
PROT SERPL-MCNC: 7.3 G/DL
SODIUM SERPL-SCNC: 140 MMOL/L

## 2019-02-04 ENCOUNTER — APPOINTMENT (OUTPATIENT)
Dept: INFUSION THERAPY | Facility: CLINIC | Age: 69
End: 2019-02-04

## 2019-02-04 ENCOUNTER — LABORATORY RESULT (OUTPATIENT)
Age: 69
End: 2019-02-04

## 2019-02-04 RX ORDER — ERYTHROPOIETIN 10000 [IU]/ML
60000 INJECTION, SOLUTION INTRAVENOUS; SUBCUTANEOUS ONCE
Qty: 0 | Refills: 0 | Status: COMPLETED | OUTPATIENT
Start: 2019-02-04 | End: 2019-02-04

## 2019-02-04 RX ADMIN — ERYTHROPOIETIN 60000 UNIT(S): 10000 INJECTION, SOLUTION INTRAVENOUS; SUBCUTANEOUS at 09:02

## 2019-02-05 LAB
HCT VFR BLD CALC: 34.1 %
HGB BLD-MCNC: 10.7 G/DL
MCHC RBC-ENTMCNC: 24.5 PG
MCHC RBC-ENTMCNC: 31.4 G/DL
MCV RBC AUTO: 78.2 FL
PLATELET # BLD AUTO: 184 K/UL
PMV BLD: 9.8 FL
RBC # BLD: 4.36 M/UL
RBC # FLD: 21.8 %
WBC # FLD AUTO: 7.06 K/UL

## 2019-02-13 ENCOUNTER — RX RENEWAL (OUTPATIENT)
Age: 69
End: 2019-02-13

## 2019-02-20 ENCOUNTER — APPOINTMENT (OUTPATIENT)
Dept: HEMATOLOGY ONCOLOGY | Facility: CLINIC | Age: 69
End: 2019-02-20

## 2019-02-20 ENCOUNTER — LABORATORY RESULT (OUTPATIENT)
Age: 69
End: 2019-02-20

## 2019-02-20 ENCOUNTER — APPOINTMENT (OUTPATIENT)
Dept: INFUSION THERAPY | Facility: CLINIC | Age: 69
End: 2019-02-20

## 2019-02-20 VITALS
TEMPERATURE: 96.8 F | SYSTOLIC BLOOD PRESSURE: 150 MMHG | HEART RATE: 77 BPM | DIASTOLIC BLOOD PRESSURE: 80 MMHG | RESPIRATION RATE: 18 BRPM

## 2019-02-20 LAB
HCT VFR BLD CALC: 34.5 %
HGB BLD-MCNC: 10.8 G/DL
MCHC RBC-ENTMCNC: 24.7 PG
MCHC RBC-ENTMCNC: 31.3 G/DL
MCV RBC AUTO: 78.8 FL
PLATELET # BLD AUTO: 157 K/UL
PMV BLD: 10.4 FL
RBC # BLD: 4.38 M/UL
RBC # FLD: 23.2 %
WBC # FLD AUTO: 6.01 K/UL

## 2019-02-20 RX ORDER — ERYTHROPOIETIN 10000 [IU]/ML
60000 INJECTION, SOLUTION INTRAVENOUS; SUBCUTANEOUS ONCE
Qty: 0 | Refills: 0 | Status: COMPLETED | OUTPATIENT
Start: 2019-02-20 | End: 2019-02-20

## 2019-02-20 RX ADMIN — ERYTHROPOIETIN 60000 UNIT(S): 10000 INJECTION, SOLUTION INTRAVENOUS; SUBCUTANEOUS at 09:33

## 2019-02-25 ENCOUNTER — LABORATORY RESULT (OUTPATIENT)
Age: 69
End: 2019-02-25

## 2019-02-25 ENCOUNTER — APPOINTMENT (OUTPATIENT)
Dept: HEMATOLOGY ONCOLOGY | Facility: CLINIC | Age: 69
End: 2019-02-25

## 2019-02-25 NOTE — ASSESSMENT
[FreeTextEntry1] : 1.Free light chain multiple myeloma, specifically lambda.  Initial beta-2 was 27.1.  His bone marrow cytogenetics was normal.  He is status post four cycles of VCD.  This is followed by autologous bone marrow transplant in 07/2015,he has been on  weekly Velcade maintenance, He is also being followed by Dr. Krysta Rod in [place].  He currently takes his acyclovir.  Last myeloma panel  showed increasing lambda light chains but marrow showed minimal plasma cells with PET CT no progression.  HIs lambda continues to increase supporting biochemical recurrence with worsening creatinine  He was started on Daratumumab, Revlimid 5 mg daily d1-21 and  Dexamethasone  40 mg weekly PO with great response and is nos most likley in CR. Patient was taking Revlimid every other day and it was held for pancytopenia.  On 6/18/18 went on Revlimid maintenance . Achieved a VGPR/CR\par - c/w Revlamid 2.5 mg every other day d1-21 every 28 days (takes 10 tabs)\par \par 2.Chronic kidney disease from multiple myeloma.  He has not been on dialysis in the past but is no off.  \par - creatinine has been relatively stable.  He follows up with Dr. Allen.\par \par 3.Normocytic anemia, related to chronic kidney disease and Revlimid Was on  Procrit  60,000 units every  week and s/p IV iron \par - will keep saturation over 20% and Ferritin over 100\par - His HGB under 10 back on Procrit.\par \par 4. Moderate to severe   thrombocytopenia most likely from Revlimid. Resolved\par \par 5. HTN \par - on meds \par \par \par PLAN:\par - Encouraged follow-up with GI for colonoscopy for surveillance since he is at risk for secondary cancer from treatment\par -free light chains are in the normal/expected range due to CKD.  In VGPR maybe CR  since LUCIA is negative, Myeloma panel stable will recheck\par - On Revlimid maintenance.  c/w Revlimid 2.5mg every other day,  21 days cycle of 28 days\par - On Procrit 60,000 units every 2 weeks, will dose Procrit today if Hgb <11 and SBP <140/80, will recheck BP again\par - once MM is active again will use Xgeva and will obtain skeletal images \par -  Shingle vaccine on hold we only have the live one awaiting  Recombinant \par \par RTC in 1 month

## 2019-02-25 NOTE — RESULTS/DATA
[FreeTextEntry1] :  Result Name Results Units Reference Range \par      PET CT WHOLE BODY TOP OF SKULL TO TI   SEE TEXT       \par     Patient Name: MOR, AUGUST : 1950\par Patient ID: [id number]\par Account: [id number]\par Patient Location: [facility]\par Accession: [id number]\par Procedure: PET CT WHOLE BODY TOP OF SKULL TO TIP OF TOES SUBSEQUENT [id number]\par Date of Exam: 2017 11:09 AM\par Attending Physician: JARVIS LOW\par Requesting Physician: JARVIS LOW MD\par Clinical History / Reason for exam: FDG PET CT STUDY:\par Reason for examination: Tumor imaging - PET with concurrently acquired CT for\par attenuation correction and anatomic localization;  top of the skull  to toes/\par 89227 multiple myeloma, subsequent treatment strategy.\par ICD-10 code:C90.0\par History: 66-year-old male patient with history of multiple myeloma, last\par chemotherapy received was in 2017. Patient had bone biopsy on\par 2017.\par Blood glucose pre injection 102 mg/dL\par Technique:\par Approximately 45 minutes after the intravenous administration of 13.4  mCi\par 18-Fluorine FDG, whole body PET images were acquired from top of  the skull to\par toes. In addition, non-iv and non oral contrast, low dose, non - diagnostic CT\par was acquired for attenuation correction and anatomic correlation only.\par Findings:\par Comparison: Prior PET CT study done on July 3, 2015. Correlation was performed\par with the skeletal survey done on 2016.\par Head /Neck: Physiologic FDG uptake is seen in the visualized region of the\par brain, pharyngeal lymphoid tissue, and salivary glands.\par Chest:\par Physiologic FDG avid PET is seen in the mediastinal blood pool and myocardium.\par Chest wall: Unremarkable\par Lungs: No abnormal uptake.\par Mediastinum/Pleura/pericardium: No abnormal uptake.\par Thoracic/ axillary lymph nodes: No abnormal uptake.\par Hepatobiliary: Unremarkable.\par Gallbladder: Multiple gallstones.\par Spleen: No abnormal uptake\par Pancreas: No abnormal uptake.\par Adrenal glands: No abnormal uptake.\par Kidneys/ureters/bladder: Normal excretory activity is demonstrated.\par Abdominal pelvic lymph nodes: No abnormal uptake.\par Bowel/peritoneum/mesentery: No abnormal uptake.\par Pelvic organs: Unremarkable. No abnormal increased uptake. Prosthetic\par calcifications.\par Bones/soft tissues: Osteoarthritic changes are identified in the bones. \par Patient\par has multiple extensive lytic lesions in the skeletal, seen in the skeletal\par survey  for details see the report  on 2016.\par In the prior examination there was PET positive lytic lesion right fifth rib\par with a max SUV 2.6, now non-FDG avid   and L5 vertebral body on the left side\par with a max SUV 3.8, now non-FDG avid with multiple other scattered lytic\par lesions, non-FDG avid.\par No abnormal increased uptake is seen in the lower extremities.\par Impression:\par 1. No new areas of abnormal increased uptake is seen.\par 2. Previously seen abnormal increased uptake seen in the right fifth rib and \par L5\par vertebral body on the left side, at this time both regions are non-FDG avid.\par 3. Multiple extensive lytic lesions in the skeletal consistent with multiple\par myeloma.\par 4. No other areas of abnormal increased uptake is seen.\par I the attending attest that I have personally reviewed these images and agree\par with this report.\par \par  \par

## 2019-02-25 NOTE — HISTORY OF PRESENT ILLNESS
[Therapy: ___] : Therapy: [unfilled] [de-identified] : REASON FOR FOLLOWUP:  Maintenance Velcade for lambda light chain myeloma, status post autologous stem cell transplant.\par \par TREATMENT HISTORY:  On 05/02/2015, he was diagnosed with lambda light chain multiple myeloma when he had a syncopal episode in [place] and was found to be in acute renal failure.  He underwent a bone marrow biopsy that confirmed lambda light chain multiple myeloma.  His beta2 microglobulin was initially 27.1 on diagnosis.  His lambda light chain prior to treatment was as high as 59815.2 on 02/27/2015.  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  There was also diffuse acute tubular injury and modular tubular atrophy with interstitial fibrosis initially on dialysis, but currently been off dialysis for approximately one year.\par \par His treatment summary is as follows.  He initiated Velcade, Cytoxan, and dexamethasone, cycle started on 03/13/2015.  He completed four cycles on 05/08/2015.  He then underwent an autologous bone marrow transplant in 07/2015 at [place].  He has been on maintenance Velcade but in 6/17 was started on DRD due to  rising lambda with normal kappa.\par \par Mr. Motta is a very pleasant 66yearold gentleman with history of lambda light chain multiple myeloma, treated as above.\par  [FreeTextEntry1] : DRD started  on 6/21/17 went on  Revlimid maintenance 6/18/18 [de-identified] : He presents today for followup she is currently on maintenance Velcade.his blood work from October 27 demonstrated a stage a faint lambda band on urine immunofixation. Nomi a count 7.66 hemoglobin 8.3 platelet count of 132. The SPEP was normal. Creatinine was stable at 5.93 potassium was 5.2 total protein 5.9 unremarkable LFTs free kappa lambda ratio normal 1.27 with free kappa at 42 and free lambda at 33.1. Most recent blood work from 1110 2016 shows a hemoglobin of 7.8. \par \par he was recently seen by Dr. Rod in  [place]. He had blood work done and reports that everything was stable. Dr. Rod also recommended to decrease his Velcade dose to 1.3 mg/m2\par \par He has no complaints. He denies any neuropathy.\par \par 1/4/17\par Presents for follow up today. Doing well on Maintenance Velcade.  No complaints. No neuropathy. \par \par 2/1/17\par He presents for follow up today. He has no complaints. Tolerating Velcade well.\par \par 4/5/17\par Doing well No complaints blood work from last visit was stable.\par \par 5/1/17\par He presents for follow up. He had increasing lambda light chains. Repeat was stable. A bone marrow showed minimal plasma cells and PET CT did not show any new activity.  He has no complaints.\par \par 6/5/2017\par Presents for follow up. He is doing well. He has some fatigue. His  Lambda has been slowly increasing. He has no other complaints.  No neuropathy\par \par 6/12/17\par He was brought back to discuss his lab work. His Lambda has been increasing with stable Kappa as well as slight worsening of his of creatinine He has no complaints.\par \par 7/12/17\par He has been on DRD. His HgB dropped to 5.9 and he is s/p PRBC transfusion.  He finished Revlimid on 7/9. His only complaint is leg crams at night. \par \par 8/9/17\par He is here for follow up. His cramps went away with the lower dose of Revlimid. His His lambda is coming down. He saw Dr. Rod last week as per patient.\par \par 8/30/27\par He is doing well. His legs cramps are better. He has severe anemia but he is asymptomatic. HIs creatinine is better and his light chain is at baseline.\par \par 9/27/17\par He is here for follow up. He needed  blood transfusion and hgb has been stable at 8 since than. He is also status post Venofer x2. He is on Procrit\par  40,000 units every 2 weeks. He feels well otherwise. \par \par 11/20/17\par Patient feels well and has no complaints. States he was taking Revlimid 5mg q48hrs prior to stopping for low counts. Patient will get Procrit and Darzalex. Patient will be restarted on Revilimid 2.5mg. Patient will continue every 2week Darzalex until week 25 then it becomes every 4 weeks. \par \par 12/18/17\par He is here for follow up. He is doing well. Blood work from last month still in CR. Cr and counts are better. He has no complaints and feel great.\par \par 1/22/18\par He is here for follow up. He is recovering from a cold. He is taking a prednisone taper given by his PMD. Had mules pain from cough.\par \par 2/26/18\par He is doing well. He has no complaints. His labs have been stable his light chain only went up slightly but ratio is normal. \par \par 3/27/18\par He is here for follow up he is doing well. He had blood work in [facility]  where he saw Dr. Rod 3/26/18 WBC 6.7, HgB 11.3, Pts 140, LFT WNL, , Ca 9.7. Cr. 5.96, alb 3.2\par \par He feels well. \par \par 4/23/18\par No events feels well. No complaints\par \par 5/21/18\par He is here for follow up. Doing well. Has no complaints\par \par 6/18/18\par His hgb has been dropping on monthly Procrit, His ligh chains are normal last Serum LUCIA from april 23 showed IgG kappa band thus he achieved aVGPR for DRD. He has no complaints.\par \par 7/16/18\par He is here for follow up. Doing well. No complaints. His kitten bit him.\par \par 8/13/18\par He is doing well. No complains. His HGb has been over 11 gm/dl So we are holding his procrit.\par \par 9/10/18\par He is here for follow up. He Myleoma panel is stable. Hgb is stable. He is due for his vaccinations 2 years post Auto and will have them today. He started Revlimid about 1 week ago. He has no complaints.\par \par 10/10/18\par He is here for follow up. He is doing well. BP is a bit high will watch. He has no complaints. He has his revlamid.\par \par 11/7/18\par He is here for follow up. His last hgb was under 10 gm/dl.  He is doing well otherwise. Was started on something for his blood pressure by Dr. Allen but he does not know what it is. He has his Revlimid\par \par 12/10/18\par Patient is here for a follow-up visit.  He is feeling well with no complaints.  Most recent CBC is stable.  Patient denies fever, chills, nausea, vomiting.  The light chains are picking up but it may be related to the CKD since LUCIA is negative and ratio is normal.\par \par 1/21/19\par He is here for follow-up with his wife.  His Kappa+Lambda have slightly risen, but the ratio is stable, so this is likely due to his CKD.   Most recent CBC is stable.  He received a prescription for Revlimid 5mg so he has been taking them every other day.  He is otherwise feeling well.  The rest of the CRAB criteria remains the same.  \par \par 2/25/19\par He is here with his wife.  As both his light chains are increasing, it is likely it is from his renal failure.  His most recent Hgb is 10.8g/dL, so we may stop when he nears 11.0g/dL.  He will see Dr. Rod on 3/25/19.

## 2019-02-26 ENCOUNTER — RX RENEWAL (OUTPATIENT)
Age: 69
End: 2019-02-26

## 2019-02-26 LAB
ALBUMIN SERPL ELPH-MCNC: 4.4 G/DL
ALP BLD-CCNC: 60 U/L
ALT SERPL-CCNC: 8 U/L
ANION GAP SERPL CALC-SCNC: 16 MMOL/L
APPEARANCE: CLEAR
AST SERPL-CCNC: 9 U/L
BILIRUB SERPL-MCNC: <0.2 MG/DL
BILIRUBIN URINE: NEGATIVE
BLOOD URINE: NEGATIVE
BUN SERPL-MCNC: 70 MG/DL
CALCIUM SERPL-MCNC: 10.1 MG/DL
CHLORIDE SERPL-SCNC: 106 MMOL/L
CO2 SERPL-SCNC: 19 MMOL/L
COLOR: YELLOW
CREAT SERPL-MCNC: 6.1 MG/DL
GLUCOSE QUALITATIVE U: NEGATIVE MG/DL
GLUCOSE SERPL-MCNC: 81 MG/DL
HCT VFR BLD CALC: 37.6 %
HGB BLD-MCNC: 11.5 G/DL
KETONES URINE: NEGATIVE
LEUKOCYTE ESTERASE URINE: NEGATIVE
MAGNESIUM SERPL-MCNC: 2.2 MG/DL
MCHC RBC-ENTMCNC: 24.5 PG
MCHC RBC-ENTMCNC: 30.6 G/DL
MCV RBC AUTO: 80.2 FL
NITRITE URINE: NEGATIVE
PH URINE: 6
PHOSPHATE SERPL-MCNC: 4.6 MG/DL
PLATELET # BLD AUTO: 193 K/UL
PMV BLD: 10.1 FL
POTASSIUM SERPL-SCNC: 4.1 MMOL/L
PROT SERPL-MCNC: 6.9 G/DL
PROTEIN URINE: ABNORMAL MG/DL
RBC # BLD: 4.69 M/UL
RBC # FLD: 23.9 %
SODIUM SERPL-SCNC: 141 MMOL/L
SPECIFIC GRAVITY URINE: 1.01
UROBILINOGEN URINE: 0.2 MG/DL (ref 0.2–?)
WBC # FLD AUTO: 6.94 K/UL

## 2019-02-28 LAB
ALBUMIN MFR SERPL ELPH: 59 %
ALBUMIN SERPL-MCNC: 4.1 G/DL
ALBUMIN/GLOB SERPL: 1.4 RATIO
ALPHA1 GLOB MFR SERPL ELPH: 6.6 %
ALPHA1 GLOB SERPL ELPH-MCNC: 0.5 G/DL
ALPHA2 GLOB MFR SERPL ELPH: 10.7 %
ALPHA2 GLOB SERPL ELPH-MCNC: 0.7 G/DL
B-GLOBULIN MFR SERPL ELPH: 10.9 %
B-GLOBULIN SERPL ELPH-MCNC: 0.8 G/DL
DEPRECATED KAPPA LC FREE/LAMBDA SER: 1.49 RATIO
GAMMA GLOB FLD ELPH-MCNC: 0.9 G/DL
GAMMA GLOB MFR SERPL ELPH: 12.8 %
IGA SER QL IEP: 178 MG/DL
IGG SER QL IEP: 898 MG/DL
IGM SER QL IEP: 17 MG/DL
INTERPRETATION SERPL IEP-IMP: NORMAL
KAPPA LC CSF-MCNC: 3.92 MG/DL
KAPPA LC SERPL-MCNC: 5.83 MG/DL
M PROTEIN MFR SERPL ELPH: NORMAL
M PROTEIN SPEC IFE-MCNC: NORMAL
MONOCLON BAND OBS SERPL: NORMAL
PROT SERPL-MCNC: 7 G/DL
PROT SERPL-MCNC: 7 G/DL

## 2019-03-07 ENCOUNTER — APPOINTMENT (OUTPATIENT)
Dept: INFUSION THERAPY | Facility: CLINIC | Age: 69
End: 2019-03-07

## 2019-03-20 ENCOUNTER — APPOINTMENT (OUTPATIENT)
Dept: HEMATOLOGY ONCOLOGY | Facility: CLINIC | Age: 69
End: 2019-03-20

## 2019-03-20 ENCOUNTER — APPOINTMENT (OUTPATIENT)
Dept: INFUSION THERAPY | Facility: CLINIC | Age: 69
End: 2019-03-20

## 2019-03-20 ENCOUNTER — LABORATORY RESULT (OUTPATIENT)
Age: 69
End: 2019-03-20

## 2019-03-20 ENCOUNTER — OUTPATIENT (OUTPATIENT)
Dept: OUTPATIENT SERVICES | Facility: HOSPITAL | Age: 69
LOS: 1 days | Discharge: HOME | End: 2019-03-20

## 2019-03-20 VITALS
SYSTOLIC BLOOD PRESSURE: 161 MMHG | TEMPERATURE: 96.2 F | DIASTOLIC BLOOD PRESSURE: 82 MMHG | WEIGHT: 165 LBS | HEART RATE: 86 BPM | BODY MASS INDEX: 26.52 KG/M2 | RESPIRATION RATE: 18 BRPM | HEIGHT: 66 IN

## 2019-03-20 DIAGNOSIS — C90.01 MULTIPLE MYELOMA IN REMISSION: ICD-10-CM

## 2019-03-20 DIAGNOSIS — D63.1 ANEMIA IN CHRONIC KIDNEY DISEASE: ICD-10-CM

## 2019-03-20 LAB
ALBUMIN SERPL ELPH-MCNC: 4.2 G/DL
ALP BLD-CCNC: 61 U/L
ALT SERPL-CCNC: 6 U/L
ANION GAP SERPL CALC-SCNC: 16 MMOL/L
AST SERPL-CCNC: 8 U/L
BILIRUB SERPL-MCNC: <0.2 MG/DL
BUN SERPL-MCNC: 66 MG/DL
CALCIUM SERPL-MCNC: 10.2 MG/DL
CHLORIDE SERPL-SCNC: 104 MMOL/L
CO2 SERPL-SCNC: 21 MMOL/L
CREAT SERPL-MCNC: 5.8 MG/DL
GLUCOSE SERPL-MCNC: 89 MG/DL
HCT VFR BLD CALC: 36.8 %
HGB BLD-MCNC: 11.3 G/DL
MCHC RBC-ENTMCNC: 24.7 PG
MCHC RBC-ENTMCNC: 30.7 G/DL
MCV RBC AUTO: 80.3 FL
PLATELET # BLD AUTO: 188 K/UL
PMV BLD: 9.8 FL
POTASSIUM SERPL-SCNC: 4.4 MMOL/L
PROT SERPL-MCNC: 6.8 G/DL
RBC # BLD: 4.58 M/UL
RBC # FLD: 23 %
SODIUM SERPL-SCNC: 141 MMOL/L
WBC # FLD AUTO: 6.52 K/UL

## 2019-03-22 NOTE — RESULTS/DATA
[FreeTextEntry1] :  Result Name Results Units Reference Range \par      PET CT WHOLE BODY TOP OF SKULL TO TI   SEE TEXT       \par     Patient Name: MOR, AUGUST : 1950\par Patient ID: [id number]\par Account: [id number]\par Patient Location: [facility]\par Accession: [id number]\par Procedure: PET CT WHOLE BODY TOP OF SKULL TO TIP OF TOES SUBSEQUENT [id number]\par Date of Exam: 2017 11:09 AM\par Attending Physician: JARVIS LOW\par Requesting Physician: JARVIS LOW MD\par Clinical History / Reason for exam: FDG PET CT STUDY:\par Reason for examination: Tumor imaging - PET with concurrently acquired CT for\par attenuation correction and anatomic localization;  top of the skull  to toes/\par 48527 multiple myeloma, subsequent treatment strategy.\par ICD-10 code:C90.0\par History: 66-year-old male patient with history of multiple myeloma, last\par chemotherapy received was in 2017. Patient had bone biopsy on\par 2017.\par Blood glucose pre injection 102 mg/dL\par Technique:\par Approximately 45 minutes after the intravenous administration of 13.4  mCi\par 18-Fluorine FDG, whole body PET images were acquired from top of  the skull to\par toes. In addition, non-iv and non oral contrast, low dose, non - diagnostic CT\par was acquired for attenuation correction and anatomic correlation only.\par Findings:\par Comparison: Prior PET CT study done on July 3, 2015. Correlation was performed\par with the skeletal survey done on 2016.\par Head /Neck: Physiologic FDG uptake is seen in the visualized region of the\par brain, pharyngeal lymphoid tissue, and salivary glands.\par Chest:\par Physiologic FDG avid PET is seen in the mediastinal blood pool and myocardium.\par Chest wall: Unremarkable\par Lungs: No abnormal uptake.\par Mediastinum/Pleura/pericardium: No abnormal uptake.\par Thoracic/ axillary lymph nodes: No abnormal uptake.\par Hepatobiliary: Unremarkable.\par Gallbladder: Multiple gallstones.\par Spleen: No abnormal uptake\par Pancreas: No abnormal uptake.\par Adrenal glands: No abnormal uptake.\par Kidneys/ureters/bladder: Normal excretory activity is demonstrated.\par Abdominal pelvic lymph nodes: No abnormal uptake.\par Bowel/peritoneum/mesentery: No abnormal uptake.\par Pelvic organs: Unremarkable. No abnormal increased uptake. Prosthetic\par calcifications.\par Bones/soft tissues: Osteoarthritic changes are identified in the bones. \par Patient\par has multiple extensive lytic lesions in the skeletal, seen in the skeletal\par survey  for details see the report  on 2016.\par In the prior examination there was PET positive lytic lesion right fifth rib\par with a max SUV 2.6, now non-FDG avid   and L5 vertebral body on the left side\par with a max SUV 3.8, now non-FDG avid with multiple other scattered lytic\par lesions, non-FDG avid.\par No abnormal increased uptake is seen in the lower extremities.\par Impression:\par 1. No new areas of abnormal increased uptake is seen.\par 2. Previously seen abnormal increased uptake seen in the right fifth rib and \par L5\par vertebral body on the left side, at this time both regions are non-FDG avid.\par 3. Multiple extensive lytic lesions in the skeletal consistent with multiple\par myeloma.\par 4. No other areas of abnormal increased uptake is seen.\par I the attending attest that I have personally reviewed these images and agree\par with this report.\par \par  \par

## 2019-03-22 NOTE — HISTORY OF PRESENT ILLNESS
[Therapy: ___] : Therapy: [unfilled] [de-identified] : REASON FOR FOLLOWUP:  Maintenance Velcade for lambda light chain myeloma, status post autologous stem cell transplant.\par \par TREATMENT HISTORY:  On 05/02/2015, he was diagnosed with lambda light chain multiple myeloma when he had a syncopal episode in [place] and was found to be in acute renal failure.  He underwent a bone marrow biopsy that confirmed lambda light chain multiple myeloma.  His beta2 microglobulin was initially 27.1 on diagnosis.  His lambda light chain prior to treatment was as high as 03906.2 on 02/27/2015.  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  There was also diffuse acute tubular injury and modular tubular atrophy with interstitial fibrosis initially on dialysis, but currently been off dialysis for approximately one year.\par \par His treatment summary is as follows.  He initiated Velcade, Cytoxan, and dexamethasone, cycle started on 03/13/2015.  He completed four cycles on 05/08/2015.  He then underwent an autologous bone marrow transplant in 07/2015 at [place].  He has been on maintenance Velcade but in 6/17 was started on DRD due to  rising lambda with normal kappa.\par \par Mr. Motta is a very pleasant 66yearold gentleman with history of lambda light chain multiple myeloma, treated as above.\par  [FreeTextEntry1] : DRD started  on 6/21/17 went on  Revlimid maintenance 6/18/18 [de-identified] : He presents today for followup she is currently on maintenance Velcade.his blood work from October 27 demonstrated a stage a faint lambda band on urine immunofixation. Nomi a count 7.66 hemoglobin 8.3 platelet count of 132. The SPEP was normal. Creatinine was stable at 5.93 potassium was 5.2 total protein 5.9 unremarkable LFTs free kappa lambda ratio normal 1.27 with free kappa at 42 and free lambda at 33.1. Most recent blood work from 1110 2016 shows a hemoglobin of 7.8. \par \par he was recently seen by Dr. Rod in  [place]. He had blood work done and reports that everything was stable. Dr. Rod also recommended to decrease his Velcade dose to 1.3 mg/m2\par \par He has no complaints. He denies any neuropathy.\par \par 1/4/17\par Presents for follow up today. Doing well on Maintenance Velcade.  No complaints. No neuropathy. \par \par 2/1/17\par He presents for follow up today. He has no complaints. Tolerating Velcade well.\par \par 4/5/17\par Doing well No complaints blood work from last visit was stable.\par \par 5/1/17\par He presents for follow up. He had increasing lambda light chains. Repeat was stable. A bone marrow showed minimal plasma cells and PET CT did not show any new activity.  He has no complaints.\par \par 6/5/2017\par Presents for follow up. He is doing well. He has some fatigue. His  Lambda has been slowly increasing. He has no other complaints.  No neuropathy\par \par 6/12/17\par He was brought back to discuss his lab work. His Lambda has been increasing with stable Kappa as well as slight worsening of his of creatinine He has no complaints.\par \par 7/12/17\par He has been on DRD. His HgB dropped to 5.9 and he is s/p PRBC transfusion.  He finished Revlimid on 7/9. His only complaint is leg crams at night. \par \par 8/9/17\par He is here for follow up. His cramps went away with the lower dose of Revlimid. His His lambda is coming down. He saw Dr. Rod last week as per patient.\par \par 8/30/27\par He is doing well. His legs cramps are better. He has severe anemia but he is asymptomatic. HIs creatinine is better and his light chain is at baseline.\par \par 9/27/17\par He is here for follow up. He needed  blood transfusion and hgb has been stable at 8 since than. He is also status post Venofer x2. He is on Procrit\par  40,000 units every 2 weeks. He feels well otherwise. \par \par 11/20/17\par Patient feels well and has no complaints. States he was taking Revlimid 5mg q48hrs prior to stopping for low counts. Patient will get Procrit and Darzalex. Patient will be restarted on Revilimid 2.5mg. Patient will continue every 2week Darzalex until week 25 then it becomes every 4 weeks. \par \par 12/18/17\par He is here for follow up. He is doing well. Blood work from last month still in CR. Cr and counts are better. He has no complaints and feel great.\par \par 1/22/18\par He is here for follow up. He is recovering from a cold. He is taking a prednisone taper given by his PMD. Had mules pain from cough.\par \par 2/26/18\par He is doing well. He has no complaints. His labs have been stable his light chain only went up slightly but ratio is normal. \par \par 3/27/18\par He is here for follow up he is doing well. He had blood work in [facility]  where he saw Dr. Rod 3/26/18 WBC 6.7, HgB 11.3, Pts 140, LFT WNL, , Ca 9.7. Cr. 5.96, alb 3.2\par \par He feels well. \par \par 4/23/18\par No events feels well. No complaints\par \par 5/21/18\par He is here for follow up. Doing well. Has no complaints\par \par 6/18/18\par His hgb has been dropping on monthly Procrit, His ligh chains are normal last Serum LUCIA from april 23 showed IgG kappa band thus he achieved aVGPR for DRD. He has no complaints.\par \par 7/16/18\par He is here for follow up. Doing well. No complaints. His kitten bit him.\par \par 8/13/18\par He is doing well. No complains. His HGb has been over 11 gm/dl So we are holding his procrit.\par \par 9/10/18\par He is here for follow up. He Myleoma panel is stable. Hgb is stable. He is due for his vaccinations 2 years post Auto and will have them today. He started Revlimid about 1 week ago. He has no complaints.\par \par 10/10/18\par He is here for follow up. He is doing well. BP is a bit high will watch. He has no complaints. He has his revlamid.\par \par 11/7/18\par He is here for follow up. His last hgb was under 10 gm/dl.  He is doing well otherwise. Was started on something for his blood pressure by Dr. Allen but he does not know what it is. He has his Revlimid\par \par 12/10/18\par Patient is here for a follow-up visit.  He is feeling well with no complaints.  Most recent CBC is stable.  Patient denies fever, chills, nausea, vomiting.  The light chains are picking up but it may be related to the CKD since LUCIA is negative and ratio is normal.\par \par 1/21/19\par He is here for follow-up with his wife.  His Kappa+Lambda have slightly risen, but the ratio is stable, so this is likely due to his CKD.   Most recent CBC is stable.  He received a prescription for Revlimid 5mg so he has been taking them every other day.  He is otherwise feeling well.  The rest of the CRAB criteria remains the same.  \par \par 2/25/19\par He is here with his wife.  As both his light chains are increasing, it is likely it is from his renal failure.  His most recent Hgb is 10.8g/dL, so we may stop when he nears 11.0g/dL.  He will see Dr. Rod on 3/25/19.   \par \par 3/20/19\par He is here for follow-up of MM with his wife.  His Hgb is stable at 11.3g/dL.  His BP is slightly elevated today. He will speak with his PCP.  He is otherwise feeling well.

## 2019-03-22 NOTE — PHYSICAL EXAM
[Fully active, able to carry on all pre-disease performance without restriction] : Status 0 - Fully active, able to carry on all pre-disease performance without restriction [Normal] : grossly intact [de-identified] : right side of face has healing lesion from cat, encouraged to go to derm

## 2019-03-22 NOTE — ASSESSMENT
[FreeTextEntry1] : #Free light chain multiple myeloma, specifically lambda.  Initial beta-2 was 27.1.  His bone marrow cytogenetics was normal.  He is status post four cycles of VCD.  This is followed by autologous bone marrow transplant in 07/2015,he has been on  weekly Velcade maintenance, He is also being followed by Dr. Krysta Rod in [place].  He currently takes his acyclovir.  Last myeloma panel  showed increasing lambda light chains but marrow showed minimal plasma cells with PET CT no progression.  HIs lambda continues to increase supporting biochemical recurrence with worsening creatinine  He was started on Daratumumab, Revlimid 5 mg daily d1-21 and  Dexamethasone  40 mg weekly PO with great response and is nos most likley in CR. Patient was taking Revlimid every other day and it was held for pancytopenia.  On 6/18/18 went on Revlimid maintenance . Achieved a VGPR/CR\par - c/w Revlamid 2.5 mg every other day d1-21 every 28 days (takes 10 tabs)\par \par #.Chronic kidney disease from multiple myeloma.  He has not been on dialysis in the past but is no off.  \par - creatinine has been relatively stable.  He follows up with Dr. Allen.\par \par #.Normocytic anemia, related to chronic kidney disease and Revlimid Was on  Procrit  60,000 units every  week and s/p IV iron . hgb now over 11 gm/dl\par - will keep saturation over 20% and Ferritin over 100\par - He does not need Procrit today.  We will hold Procrit temporarily and check CBC in 2 weeks, if he begins to drop we can resume Procrit 60,000 units every 2 weeks, if Hgb <11 and SBP <140/80\par \par 4. Moderate to severe   thrombocytopenia most likely from Revlimid. \par - Resolved\par \par 5. HTN \par - on meds , will follow-up with his PCP for adjustment\par \par PLAN:\par - Encouraged follow-up with GI for colonoscopy for surveillance since he is at risk for secondary cancer from treatment\par -c.w Revlimid\par -Procrit on hold sice Hgb is over 11 gm/dl\par -  Shingle vaccine on hold we only have the live one awaiting  Recombinant \par \par RTC in 1 month, CBC in 2 weeks

## 2019-03-26 LAB
ALBUMIN MFR SERPL ELPH: 56.7 %
ALBUMIN SERPL-MCNC: 4 G/DL
ALBUMIN/GLOB SERPL: 1.3 RATIO
ALPHA1 GLOB MFR SERPL ELPH: 6.1 %
ALPHA1 GLOB SERPL ELPH-MCNC: 0.4 G/DL
ALPHA2 GLOB MFR SERPL ELPH: 13.2 %
ALPHA2 GLOB SERPL ELPH-MCNC: 0.9 G/DL
B-GLOBULIN MFR SERPL ELPH: 11.3 %
B-GLOBULIN SERPL ELPH-MCNC: 0.8 G/DL
DEPRECATED KAPPA LC FREE/LAMBDA SER: 1.36 RATIO
GAMMA GLOB FLD ELPH-MCNC: 0.9 G/DL
GAMMA GLOB MFR SERPL ELPH: 12.7 %
IGA SER QL IEP: 176 MG/DL
IGG SER QL IEP: 869 MG/DL
IGM SER QL IEP: 16 MG/DL
INTERPRETATION SERPL IEP-IMP: NORMAL
KAPPA LC CSF-MCNC: 4.79 MG/DL
KAPPA LC SERPL-MCNC: 6.51 MG/DL
M PROTEIN MFR SERPL ELPH: NORMAL
M PROTEIN SPEC IFE-MCNC: NORMAL
MONOCLON BAND OBS SERPL: NORMAL
PROT SERPL-MCNC: 7 G/DL
PROT SERPL-MCNC: 7 G/DL

## 2019-04-03 ENCOUNTER — APPOINTMENT (OUTPATIENT)
Dept: HEMATOLOGY ONCOLOGY | Facility: CLINIC | Age: 69
End: 2019-04-03

## 2019-04-11 ENCOUNTER — RX RENEWAL (OUTPATIENT)
Age: 69
End: 2019-04-11

## 2019-04-22 ENCOUNTER — APPOINTMENT (OUTPATIENT)
Dept: HEMATOLOGY ONCOLOGY | Facility: CLINIC | Age: 69
End: 2019-04-22

## 2019-04-22 ENCOUNTER — LABORATORY RESULT (OUTPATIENT)
Age: 69
End: 2019-04-22

## 2019-04-22 VITALS
BODY MASS INDEX: 26.2 KG/M2 | RESPIRATION RATE: 18 BRPM | DIASTOLIC BLOOD PRESSURE: 60 MMHG | HEART RATE: 90 BPM | TEMPERATURE: 98.1 F | SYSTOLIC BLOOD PRESSURE: 125 MMHG | HEIGHT: 66 IN | WEIGHT: 163 LBS

## 2019-04-22 LAB
HCT VFR BLD CALC: 32.6 %
HGB BLD-MCNC: 10.2 G/DL
MCHC RBC-ENTMCNC: 25.1 PG
MCHC RBC-ENTMCNC: 31.3 G/DL
MCV RBC AUTO: 80.3 FL
PLATELET # BLD AUTO: 199 K/UL
PMV BLD: 9.4 FL
RBC # BLD: 4.06 M/UL
RBC # FLD: 21.2 %
WBC # FLD AUTO: 6.3 K/UL

## 2019-04-23 LAB
ALBUMIN SERPL ELPH-MCNC: 4.2 G/DL
ALP BLD-CCNC: 62 U/L
ALT SERPL-CCNC: 10 U/L
ANION GAP SERPL CALC-SCNC: 16 MMOL/L
AST SERPL-CCNC: 10 U/L
BILIRUB SERPL-MCNC: <0.2 MG/DL
BUN SERPL-MCNC: 59 MG/DL
CALCIUM SERPL-MCNC: 9.4 MG/DL
CHLORIDE SERPL-SCNC: 105 MMOL/L
CO2 SERPL-SCNC: 17 MMOL/L
CREAT SERPL-MCNC: 6 MG/DL
GLUCOSE SERPL-MCNC: 143 MG/DL
POTASSIUM SERPL-SCNC: 3.9 MMOL/L
PROT SERPL-MCNC: 6.5 G/DL
SODIUM SERPL-SCNC: 138 MMOL/L

## 2019-04-25 NOTE — RESULTS/DATA
[FreeTextEntry1] :  Result Name Results Units Reference Range \par      PET CT WHOLE BODY TOP OF SKULL TO TI   SEE TEXT       \par     Patient Name: MOR, AUGUST : 1950\par Patient ID: [id number]\par Account: [id number]\par Patient Location: [facility]\par Accession: [id number]\par Procedure: PET CT WHOLE BODY TOP OF SKULL TO TIP OF TOES SUBSEQUENT 250-0036\par Date of Exam: 2017 11:09 AM\par Attending Physician: JARVIS LOW\par Requesting Physician: JARVIS LOW MD\par Clinical History / Reason for exam: FDG PET CT STUDY:\par Reason for examination: Tumor imaging - PET with concurrently acquired CT for\par attenuation correction and anatomic localization;  top of the skull  to toes/\par [address]704 multiple myeloma, subsequent treatment strategy.\par ICD-10 code:C90.0\par History: 66-year-old male patient with history of multiple myeloma, last\par chemotherapy received was in 2017. Patient had bone biopsy on\par 2017.\par Blood glucose pre injection 102 mg/dL\par Technique:\par Approximately 45 minutes after the intravenous administration of 13.4  mCi\par 18-Fluorine FDG, whole body PET images were acquired from top of  the skull to\par toes. In addition, non-iv and non oral contrast, low dose, non - diagnostic CT\par was acquired for attenuation correction and anatomic correlation only.\par Findings:\par Comparison: Prior PET CT study done on July 3, 2015. Correlation was performed\par with the skeletal survey done on 2016.\par Head /Neck: Physiologic FDG uptake is seen in the visualized region of the\par brain, pharyngeal lymphoid tissue, and salivary glands.\par Chest:\par Physiologic FDG avid PET is seen in the mediastinal blood pool and myocardium.\par Chest wall: Unremarkable\par Lungs: No abnormal uptake.\par Mediastinum/Pleura/pericardium: No abnormal uptake.\par Thoracic/ axillary lymph nodes: No abnormal uptake.\par Hepatobiliary: Unremarkable.\par Gallbladder: Multiple gallstones.\par Spleen: No abnormal uptake\par Pancreas: No abnormal uptake.\par Adrenal glands: No abnormal uptake.\par Kidneys/ureters/bladder: Normal excretory activity is demonstrated.\par Abdominal pelvic lymph nodes: No abnormal uptake.\par Bowel/peritoneum/mesentery: No abnormal uptake.\par Pelvic organs: Unremarkable. No abnormal increased uptake. Prosthetic\par calcifications.\par Bones/soft tissues: Osteoarthritic changes are identified in the bones. \par Patient\par has multiple extensive lytic lesions in the skeletal, seen in the skeletal\par survey  for details see the report  on 2016.\par In the prior examination there was PET positive lytic lesion right fifth rib\par with a max SUV 2.6, now non-FDG avid   and L5 vertebral body on the left side\par with a max SUV 3.8, now non-FDG avid with multiple other scattered lytic\par lesions, non-FDG avid.\par No abnormal increased uptake is seen in the lower extremities.\par Impression:\par 1. No new areas of abnormal increased uptake is seen.\par 2. Previously seen abnormal increased uptake seen in the right fifth rib and \par L5\par vertebral body on the left side, at this time both regions are non-FDG avid.\par 3. Multiple extensive lytic lesions in the skeletal consistent with multiple\par myeloma.\par 4. No other areas of abnormal increased uptake is seen.\par I the attending attest that I have personally reviewed these images and agree\par with this report.\par \par  \par

## 2019-04-25 NOTE — ASSESSMENT
[FreeTextEntry1] : #Free light chain multiple myeloma, specifically lambda.  Initial beta-2 was 27.1.  His bone marrow cytogenetics was normal.  He is status post four cycles of VCD.  This is followed by autologous bone marrow transplant in 07/2015,he has been on  weekly Velcade maintenance, He is also being followed by Dr. Krysta Rod in [place].  He currently takes his acyclovir.  Last myeloma panel  showed increasing lambda light chains but marrow showed minimal plasma cells with PET CT no progression.  HIs lambda continues to increase supporting biochemical recurrence with worsening creatinine  He was started on Daratumumab, Revlimid 5 mg daily d1-21 and  Dexamethasone  40 mg weekly PO with great response and is nos most likley in CR. Patient was taking Revlimid every other day and it was held for pancytopenia.  On 6/18/18 went on Revlimid maintenance . Achieved a VGPR/CR. Since 2/2019 Serum LUCIA is now positive thus Relapse from complete response,  but  no evidence of clinical relapse thus would keep going with Revlamid. If   affected light chain begins to rise will check PET/CT possible repeat marrow and will consider change in therapy, multiple options such as restating Esthela and going to pomalidomide with Dex or Pom Dex and Slam7 inhibition or Esthela, carfilzomib and dex or  clinical trial if available  \par - c/w Revlimid 2.5 mg every other day d1-21 every 28 days (takes 10 tabs) for now\par \par \par #.Chronic kidney disease from multiple myeloma.  He has not been on dialysis in the past but is no off.  \par - creatinine has been relatively stable.  He follows up with Dr. Allen.\par \par #.Normocytic anemia, related to chronic kidney disease and Revlimid Was on  Procrit  60,000 units every  week and s/p IV iron . hgb now over 11 gm/dl\par - will keep saturation over 20% and Ferritin over 100\par - He does not need Procrit today.  We will hold Procrit temporarily, if he begins to drop under 10g/dL, we can resume Procrit 60,000 units every 2 weeks, until  Hgb <11 and SBP <140/80\par \par 4. Moderate to severe   thrombocytopenia most likely from Revlimid. \par - Resolved\par \par 5. HTN \par - on meds , will follow-up with his PCP for adjustment, stable today\par \par - Shingle vaccine on hold we only have the live one awaiting  Recombinant \par \par RTC in 1 month, CMP and Paraprotein levels sent

## 2019-04-25 NOTE — REVIEW OF SYSTEMS
[Negative] : Allergic/Immunologic [Fatigue] : no fatigue [Dry Eyes] : no dryness of the eyes [Odynophagia] : no odynophagia [Dysphagia] : no dysphagia [Mucosal Pain] : no mucosal pain [Leg Claudication] : no intermittent leg claudication [SOB on Exertion] : no shortness of breath during exertion [Lower Ext Edema] : no lower extremity edema [Muscle Pain] : no muscle pain

## 2019-04-25 NOTE — HISTORY OF PRESENT ILLNESS
[Therapy: ___] : Therapy: [unfilled] [FreeTextEntry1] : DRD started  on 6/21/17 went on  Revlimid maintenance 6/18/18 [de-identified] : REASON FOR FOLLOWUP:  Maintenance Velcade for lambda light chain myeloma, status post autologous stem cell transplant.\par \par TREATMENT HISTORY:  On 05/02/2015, he was diagnosed with lambda light chain multiple myeloma when he had a syncopal episode in [place] and was found to be in acute renal failure.  He underwent a bone marrow biopsy that confirmed lambda light chain multiple myeloma.  His beta2 microglobulin was initially 27.1 on diagnosis.  His lambda light chain prior to treatment was as high as 03323.2 on 02/27/2015.  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  There was also diffuse acute tubular injury and modular tubular atrophy with interstitial fibrosis initially on dialysis, but currently been off dialysis for approximately one year.\par \par His treatment summary is as follows.  He initiated Velcade, Cytoxan, and dexamethasone, cycle started on 03/13/2015.  He completed four cycles on 05/08/2015.  He then underwent an autologous bone marrow transplant in 07/2015 at [place].  He has been on maintenance Velcade but in 6/17 was started on DRD due to  rising lambda with normal kappa.\par \par Mr. Motta is a very pleasant 66yearold gentleman with history of lambda light chain multiple myeloma, treated as above.\par  [de-identified] : He presents today for followup she is currently on maintenance Velcade.his blood work from October 27 demonstrated a stage a faint lambda band on urine immunofixation. Nomi a count 7.66 hemoglobin 8.3 platelet count of 132. The SPEP was normal. Creatinine was stable at 5.93 potassium was 5.2 total protein 5.9 unremarkable LFTs free kappa lambda ratio normal 1.27 with free kappa at 42 and free lambda at 33.1. Most recent blood work from 1110 2016 shows a hemoglobin of 7.8. \par \par he was recently seen by Dr. Rod in  [place]. He had blood work done and reports that everything was stable. Dr. Rod also recommended to decrease his Velcade dose to 1.3 mg/m2\par \par He has no complaints. He denies any neuropathy.\par \par 1/4/17\par Presents for follow up today. Doing well on Maintenance Velcade.  No complaints. No neuropathy. \par \par 2/1/17\par He presents for follow up today. He has no complaints. Tolerating Velcade well.\par \par 4/5/17\par Doing well No complaints blood work from last visit was stable.\par \par 5/1/17\par He presents for follow up. He had increasing lambda light chains. Repeat was stable. A bone marrow showed minimal plasma cells and PET CT did not show any new activity.  He has no complaints.\par \par 6/5/2017\par Presents for follow up. He is doing well. He has some fatigue. His  Lambda has been slowly increasing. He has no other complaints.  No neuropathy\par \par 6/12/17\par He was brought back to discuss his lab work. His Lambda has been increasing with stable Kappa as well as slight worsening of his of creatinine He has no complaints.\par \par 7/12/17\par He has been on DRD. His HgB dropped to 5.9 and he is s/p PRBC transfusion.  He finished Revlimid on 7/9. His only complaint is leg crams at night. \par \par 8/9/17\par He is here for follow up. His cramps went away with the lower dose of Revlimid. His His lambda is coming down. He saw Dr. Rod last week as per patient.\par \par 8/30/27\par He is doing well. His legs cramps are better. He has severe anemia but he is asymptomatic. HIs creatinine is better and his light chain is at baseline.\par \par 9/27/17\par He is here for follow up. He needed  blood transfusion and hgb has been stable at 8 since than. He is also status post Venofer x2. He is on Procrit\par  40,000 units every 2 weeks. He feels well otherwise. \par \par 11/20/17\par Patient feels well and has no complaints. States he was taking Revlimid 5mg q48hrs prior to stopping for low counts. Patient will get Procrit and Darzalex. Patient will be restarted on Revilimid 2.5mg. Patient will continue every 2week Darzalex until week 25 then it becomes every 4 weeks. \par \par 12/18/17\par He is here for follow up. He is doing well. Blood work from last month still in CR. Cr and counts are better. He has no complaints and feel great.\par \par 1/22/18\par He is here for follow up. He is recovering from a cold. He is taking a prednisone taper given by his PMD. Had mules pain from cough.\par \par 2/26/18\par He is doing well. He has no complaints. His labs have been stable his light chain only went up slightly but ratio is normal. \par \par 3/27/18\par He is here for follow up he is doing well. He had blood work in [facility]  where he saw Dr. Rod 3/26/18 WBC 6.7, HgB 11.3, Pts 140, LFT WNL, , Ca 9.7. Cr. 5.96, alb 3.2\par \par He feels well. \par \par 4/23/18\par No events feels well. No complaints\par \par 5/21/18\par He is here for follow up. Doing well. Has no complaints\par \par 6/18/18\par His hgb has been dropping on monthly Procrit, His ligh chains are normal last Serum LUCIA from april 23 showed IgG kappa band thus he achieved aVGPR for DRD. He has no complaints.\par \par 7/16/18\par He is here for follow up. Doing well. No complaints. His kitten bit him.\par \par 8/13/18\par He is doing well. No complains. His HGb has been over 11 gm/dl So we are holding his procrit.\par \par 9/10/18\par He is here for follow up. He Myleoma panel is stable. Hgb is stable. He is due for his vaccinations 2 years post Auto and will have them today. He started Revlimid about 1 week ago. He has no complaints.\par \par 10/10/18\par He is here for follow up. He is doing well. BP is a bit high will watch. He has no complaints. He has his revlamid.\par \par 11/7/18\par He is here for follow up. His last hgb was under 10 gm/dl.  He is doing well otherwise. Was started on something for his blood pressure by Dr. Allen but he does not know what it is. He has his Revlimid\par \par 12/10/18\par Patient is here for a follow-up visit.  He is feeling well with no complaints.  Most recent CBC is stable.  Patient denies fever, chills, nausea, vomiting.  The light chains are picking up but it may be related to the CKD since LUCIA is negative and ratio is normal.\par \par 1/21/19\par He is here for follow-up with his wife.  His Kappa+Lambda have slightly risen, but the ratio is stable, so this is likely due to his CKD.   Most recent CBC is stable.  He received a prescription for Revlimid 5mg so he has been taking them every other day.  He is otherwise feeling well.  The rest of the CRAB criteria remains the same.  \par \par 2/25/19\par He is here with his wife.  As both his light chains are increasing, it is likely it is from his renal failure.  His most recent Hgb is 10.8g/dL, so we may stop when he nears 11.0g/dL.  He will see Dr. Rod on 3/25/19.   \par \par 3/20/19\par He is here for follow-up of MM with his wife.  His Hgb is stable at 11.3g/dL.  His BP is slightly elevated today. He will speak with his PCP.  He is otherwise feeling well.\par \par 4/22/19\par As of February, the immunofixation is picking up weak IgG Kappa bands no CRAB criteria  or significant rise in paraprotein that suggests progression .  We will continue with Revlimid at this time.   They went to see Dr. Rod from [facility] last month.   HE feesl well otherwise

## 2019-04-25 NOTE — PHYSICAL EXAM
[Fully active, able to carry on all pre-disease performance without restriction] : Status 0 - Fully active, able to carry on all pre-disease performance without restriction [Normal] : affect appropriate [de-identified] : right side of face has healing lesion from cat, encouraged to go to derm

## 2019-05-06 ENCOUNTER — RX RENEWAL (OUTPATIENT)
Age: 69
End: 2019-05-06

## 2019-05-06 ENCOUNTER — APPOINTMENT (OUTPATIENT)
Dept: INFUSION THERAPY | Facility: CLINIC | Age: 69
End: 2019-05-06

## 2019-05-06 ENCOUNTER — LABORATORY RESULT (OUTPATIENT)
Age: 69
End: 2019-05-06

## 2019-05-10 LAB
HCT VFR BLD CALC: 31.9 %
HGB BLD-MCNC: 10.2 G/DL
MCHC RBC-ENTMCNC: 25.5 PG
MCHC RBC-ENTMCNC: 32 G/DL
MCV RBC AUTO: 79.8 FL
PLATELET # BLD AUTO: 160 K/UL
PMV BLD: 10.9 FL
RBC # BLD: 4 M/UL
RBC # FLD: 19.7 %
WBC # FLD AUTO: 8.33 K/UL

## 2019-05-20 ENCOUNTER — APPOINTMENT (OUTPATIENT)
Dept: HEMATOLOGY ONCOLOGY | Facility: CLINIC | Age: 69
End: 2019-05-20

## 2019-05-20 ENCOUNTER — LABORATORY RESULT (OUTPATIENT)
Age: 69
End: 2019-05-20

## 2019-05-20 ENCOUNTER — APPOINTMENT (OUTPATIENT)
Dept: INFUSION THERAPY | Facility: CLINIC | Age: 69
End: 2019-05-20

## 2019-05-20 VITALS
TEMPERATURE: 96.8 F | HEIGHT: 66 IN | HEART RATE: 80 BPM | DIASTOLIC BLOOD PRESSURE: 73 MMHG | RESPIRATION RATE: 18 BRPM | SYSTOLIC BLOOD PRESSURE: 150 MMHG

## 2019-05-20 LAB
ALBUMIN SERPL ELPH-MCNC: 4.2 G/DL
ALP BLD-CCNC: 58 U/L
ALT SERPL-CCNC: 9 U/L
ANION GAP SERPL CALC-SCNC: 18 MMOL/L
AST SERPL-CCNC: 10 U/L
BILIRUB SERPL-MCNC: <0.2 MG/DL
BUN SERPL-MCNC: 66 MG/DL
CALCIUM SERPL-MCNC: 9.8 MG/DL
CHLORIDE SERPL-SCNC: 105 MMOL/L
CO2 SERPL-SCNC: 18 MMOL/L
CREAT SERPL-MCNC: 6.3 MG/DL
GLUCOSE SERPL-MCNC: 105 MG/DL
HCT VFR BLD CALC: 30.9 %
HGB BLD-MCNC: 9.6 G/DL
MCHC RBC-ENTMCNC: 24.9 PG
MCHC RBC-ENTMCNC: 31.1 G/DL
MCV RBC AUTO: 80.1 FL
PLATELET # BLD AUTO: 205 K/UL
PMV BLD: 10.3 FL
POTASSIUM SERPL-SCNC: 4.2 MMOL/L
PROT SERPL-MCNC: 6.6 G/DL
RBC # BLD: 3.86 M/UL
RBC # FLD: 18.7 %
SODIUM SERPL-SCNC: 141 MMOL/L
WBC # FLD AUTO: 6.44 K/UL

## 2019-05-20 RX ORDER — ERYTHROPOIETIN 10000 [IU]/ML
60000 INJECTION, SOLUTION INTRAVENOUS; SUBCUTANEOUS ONCE
Refills: 0 | Status: DISCONTINUED | OUTPATIENT
Start: 2019-05-20 | End: 2019-05-20

## 2019-05-20 RX ORDER — ERYTHROPOIETIN 10000 [IU]/ML
60000 INJECTION, SOLUTION INTRAVENOUS; SUBCUTANEOUS ONCE
Refills: 0 | Status: COMPLETED | OUTPATIENT
Start: 2019-05-20 | End: 2019-05-20

## 2019-05-20 RX ORDER — DEXAMETHASONE 4 MG/1
4 TABLET ORAL
Qty: 172 | Refills: 2 | Status: COMPLETED | COMMUNITY
Start: 2017-06-12 | End: 2019-05-20

## 2019-05-20 RX ADMIN — ERYTHROPOIETIN 60000 UNIT(S): 10000 INJECTION, SOLUTION INTRAVENOUS; SUBCUTANEOUS at 09:12

## 2019-05-20 NOTE — ASSESSMENT
[FreeTextEntry1] : #Free light chain multiple myeloma, specifically lambda.  Initial beta-2 was 27.1.  His bone marrow cytogenetics was normal.  He is status post four cycles of VCD.  This is followed by autologous bone marrow transplant in 07/2015,he has been on  weekly Velcade maintenance, He is also being followed by Dr. Krysta Rod in [place].  He currently takes his acyclovir.  Last myeloma panel  showed increasing lambda light chains but marrow showed minimal plasma cells with PET CT no progression.  HIs lambda continues to increase supporting biochemical recurrence with worsening creatinine  He was started on Daratumumab, Revlimid 5 mg daily d1-21 and  Dexamethasone  40 mg weekly PO with great response and is nos most likley in CR. Patient was taking Revlimid every other day and it was held for pancytopenia.  On 6/18/18 went on Revlimid maintenance . Achieved a VGPR/CR. Since 2/2019 Serum LUCIA is now positive thus Relapse from complete response,  but  no evidence of clinical relapse thus would keep going with Revlamid. If   affected light chain begins to rise constantly  will check PET/CT possible repeat marrow and will consider change in therapy, multiple options such as restating Esthela and going to pomalidomide with Dex or Pom Dex and Slam7 inhibition or Esthela, carfilzomib and dex or  clinical trial if available  \par - c/w Revlimid 2.5 mg every other day d1-21 every 28 days (takes 10 tabs) for now\par \par \par #.Chronic kidney disease from multiple myeloma.  He has not been on dialysis in the past but is no off.  \par - creatinine has been relatively stable.  He follows up with Dr. Allen.\par \par #.Normocytic anemia, related to chronic kidney disease and Revlimid will restart   Procrit  60,000 units every  other  week and s/p IV iron . \par - will keep saturation over 20% and Ferritin over 100\par - He does not need Procrit today.  We will hold Procrit temporarily, if he begins to drop under 10g/dL, we can resume Procrit 60,000 units every 2 weeks, until  Hgb <11 and SBP <140/80\par \par 4. Moderate to severe   thrombocytopenia most likely from Revlimid. \par - Resolved\par \par 5. HTN \par - on meds , will follow-up with his PCP for adjustment, stable today\par \par - Shingle vaccine on hold we only have the live one awaiting  Recombinant \par \par RTC in 1 month, CMP and Paraprotein levels sent

## 2019-05-20 NOTE — REVIEW OF SYSTEMS
[Dry Eyes] : no dryness of the eyes [Fatigue] : no fatigue [Dysphagia] : no dysphagia [Odynophagia] : no odynophagia [Lower Ext Edema] : no lower extremity edema [Mucosal Pain] : no mucosal pain [Leg Claudication] : no intermittent leg claudication [Muscle Pain] : no muscle pain [SOB on Exertion] : no shortness of breath during exertion [Negative] : Allergic/Immunologic

## 2019-05-20 NOTE — CONSULT LETTER
[Dear  ___] : Dear  [unfilled], [Sincerely,] : Sincerely, [Referral Closing:] : Thank you very much for seeing this patient.  If you have any questions, please do not hesitate to contact me. [FreeTextEntry3] : Andrew Pantoja [DrOrquidea  ___] : Dr. RECIO

## 2019-05-20 NOTE — RESULTS/DATA
[FreeTextEntry1] :  Result Name Results Units Reference Range \par      PET CT WHOLE BODY TOP OF SKULL TO TI   SEE TEXT       \par     Patient Name: MOR, AUGUST : 1950\par Patient ID: [id number]\par Account: [id number]\par Patient Location: [facility]\par Accession: [id number]\par Procedure: PET CT WHOLE BODY TOP OF SKULL TO TIP OF TOES SUBSEQUENT 250-0036\par Date of Exam: 2017 11:09 AM\par Attending Physician: JARVIS LOW\par Requesting Physician: JARVIS LOW MD\par Clinical History / Reason for exam: FDG PET CT STUDY:\par Reason for examination: Tumor imaging - PET with concurrently acquired CT for\par attenuation correction and anatomic localization;  top of the skull  to toes/\par [address] multiple myeloma, subsequent treatment strategy.\par ICD-10 code:C90.0\par History: 66-year-old male patient with history of multiple myeloma, last\par chemotherapy received was in 2017. Patient had bone biopsy on\par 2017.\par Blood glucose pre injection 102 mg/dL\par Technique:\par Approximately 45 minutes after the intravenous administration of 13.4  mCi\par 18-Fluorine FDG, whole body PET images were acquired from top of  the skull to\par toes. In addition, non-iv and non oral contrast, low dose, non - diagnostic CT\par was acquired for attenuation correction and anatomic correlation only.\par Findings:\par Comparison: Prior PET CT study done on July 3, 2015. Correlation was performed\par with the skeletal survey done on 2016.\par Head /Neck: Physiologic FDG uptake is seen in the visualized region of the\par brain, pharyngeal lymphoid tissue, and salivary glands.\par Chest:\par Physiologic FDG avid PET is seen in the mediastinal blood pool and myocardium.\par Chest wall: Unremarkable\par Lungs: No abnormal uptake.\par Mediastinum/Pleura/pericardium: No abnormal uptake.\par Thoracic/ axillary lymph nodes: No abnormal uptake.\par Hepatobiliary: Unremarkable.\par Gallbladder: Multiple gallstones.\par Spleen: No abnormal uptake\par Pancreas: No abnormal uptake.\par Adrenal glands: No abnormal uptake.\par Kidneys/ureters/bladder: Normal excretory activity is demonstrated.\par Abdominal pelvic lymph nodes: No abnormal uptake.\par Bowel/peritoneum/mesentery: No abnormal uptake.\par Pelvic organs: Unremarkable. No abnormal increased uptake. Prosthetic\par calcifications.\par Bones/soft tissues: Osteoarthritic changes are identified in the bones. \par Patient\par has multiple extensive lytic lesions in the skeletal, seen in the skeletal\par survey  for details see the report  on 2016.\par In the prior examination there was PET positive lytic lesion right fifth rib\par with a max SUV 2.6, now non-FDG avid   and L5 vertebral body on the left side\par with a max SUV 3.8, now non-FDG avid with multiple other scattered lytic\par lesions, non-FDG avid.\par No abnormal increased uptake is seen in the lower extremities.\par Impression:\par 1. No new areas of abnormal increased uptake is seen.\par 2. Previously seen abnormal increased uptake seen in the right fifth rib and \par L5\par vertebral body on the left side, at this time both regions are non-FDG avid.\par 3. Multiple extensive lytic lesions in the skeletal consistent with multiple\par myeloma.\par 4. No other areas of abnormal increased uptake is seen.\par I the attending attest that I have personally reviewed these images and agree\par with this report.\par \par  \par

## 2019-05-20 NOTE — PHYSICAL EXAM
[Fully active, able to carry on all pre-disease performance without restriction] : Status 0 - Fully active, able to carry on all pre-disease performance without restriction [Normal] : full range of motion and no deformities appreciated [de-identified] : right side of face has healing lesion from cat, encouraged to go to derm

## 2019-05-20 NOTE — HISTORY OF PRESENT ILLNESS
[de-identified] : REASON FOR FOLLOWUP:  Maintenance Velcade for lambda light chain myeloma, status post autologous stem cell transplant.\par \par TREATMENT HISTORY:  On 05/02/2015, he was diagnosed with lambda light chain multiple myeloma when he had a syncopal episode in [place] and was found to be in acute renal failure.  He underwent a bone marrow biopsy that confirmed lambda light chain multiple myeloma.  His beta2 microglobulin was initially 27.1 on diagnosis.  His lambda light chain prior to treatment was as high as 42334.2 on 02/27/2015.  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  There was also diffuse acute tubular injury and modular tubular atrophy with interstitial fibrosis initially on dialysis, but currently been off dialysis for approximately one year.\par \par His treatment summary is as follows.  He initiated Velcade, Cytoxan, and dexamethasone, cycle started on 03/13/2015.  He completed four cycles on 05/08/2015.  He then underwent an autologous bone marrow transplant in 07/2015 at [place].  He has been on maintenance Velcade but in 6/17 was started on DRD due to  rising lambda with normal kappa.\par \par Mr. Motta is a very pleasant 66yearold gentleman with history of lambda light chain multiple myeloma, treated as above.\par  [FreeTextEntry1] : DRD started  on 6/21/17 went on  Revlimid maintenance 6/18/18 [Therapy: ___] : Therapy: [unfilled] [de-identified] : He presents today for followup she is currently on maintenance Velcade.his blood work from October 27 demonstrated a stage a faint lambda band on urine immunofixation. Nomi a count 7.66 hemoglobin 8.3 platelet count of 132. The SPEP was normal. Creatinine was stable at 5.93 potassium was 5.2 total protein 5.9 unremarkable LFTs free kappa lambda ratio normal 1.27 with free kappa at 42 and free lambda at 33.1. Most recent blood work from 1110 2016 shows a hemoglobin of 7.8. \par \par he was recently seen by Dr. Rod in  [place]. He had blood work done and reports that everything was stable. Dr. Rod also recommended to decrease his Velcade dose to 1.3 mg/m2\par \par He has no complaints. He denies any neuropathy.\par \par 1/4/17\par Presents for follow up today. Doing well on Maintenance Velcade.  No complaints. No neuropathy. \par \par 2/1/17\par He presents for follow up today. He has no complaints. Tolerating Velcade well.\par \par 4/5/17\par Doing well No complaints blood work from last visit was stable.\par \par 5/1/17\par He presents for follow up. He had increasing lambda light chains. Repeat was stable. A bone marrow showed minimal plasma cells and PET CT did not show any new activity.  He has no complaints.\par \par 6/5/2017\par Presents for follow up. He is doing well. He has some fatigue. His  Lambda has been slowly increasing. He has no other complaints.  No neuropathy\par \par 6/12/17\par He was brought back to discuss his lab work. His Lambda has been increasing with stable Kappa as well as slight worsening of his of creatinine He has no complaints.\par \par 7/12/17\par He has been on DRD. His HgB dropped to 5.9 and he is s/p PRBC transfusion.  He finished Revlimid on 7/9. His only complaint is leg crams at night. \par \par 8/9/17\par He is here for follow up. His cramps went away with the lower dose of Revlimid. His His lambda is coming down. He saw Dr. Rod last week as per patient.\par \par 8/30/27\par He is doing well. His legs cramps are better. He has severe anemia but he is asymptomatic. HIs creatinine is better and his light chain is at baseline.\par \par 9/27/17\par He is here for follow up. He needed  blood transfusion and hgb has been stable at 8 since than. He is also status post Venofer x2. He is on Procrit\par  40,000 units every 2 weeks. He feels well otherwise. \par \par 11/20/17\par Patient feels well and has no complaints. States he was taking Revlimid 5mg q48hrs prior to stopping for low counts. Patient will get Procrit and Darzalex. Patient will be restarted on Revilimid 2.5mg. Patient will continue every 2week Darzalex until week 25 then it becomes every 4 weeks. \par \par 12/18/17\par He is here for follow up. He is doing well. Blood work from last month still in CR. Cr and counts are better. He has no complaints and feel great.\par \par 1/22/18\par He is here for follow up. He is recovering from a cold. He is taking a prednisone taper given by his PMD. Had mules pain from cough.\par \par 2/26/18\par He is doing well. He has no complaints. His labs have been stable his light chain only went up slightly but ratio is normal. \par \par 3/27/18\par He is here for follow up he is doing well. He had blood work in [facility]  where he saw Dr. Rod 3/26/18 WBC 6.7, HgB 11.3, Pts 140, LFT WNL, , Ca 9.7. Cr. 5.96, alb 3.2\par \par He feels well. \par \par 4/23/18\par No events feels well. No complaints\par \par 5/21/18\par He is here for follow up. Doing well. Has no complaints\par \par 6/18/18\par His hgb has been dropping on monthly Procrit, His ligh chains are normal last Serum LUCIA from april 23 showed IgG kappa band thus he achieved aVGPR for DRD. He has no complaints.\par \par 7/16/18\par He is here for follow up. Doing well. No complaints. His kitten bit him.\par \par 8/13/18\par He is doing well. No complains. His HGb has been over 11 gm/dl So we are holding his procrit.\par \par 9/10/18\par He is here for follow up. He Myleoma panel is stable. Hgb is stable. He is due for his vaccinations 2 years post Auto and will have them today. He started Revlimid about 1 week ago. He has no complaints.\par \par 10/10/18\par He is here for follow up. He is doing well. BP is a bit high will watch. He has no complaints. He has his revlamid.\par \par 11/7/18\par He is here for follow up. His last hgb was under 10 gm/dl.  He is doing well otherwise. Was started on something for his blood pressure by Dr. Allen but he does not know what it is. He has his Revlimid\par \par 12/10/18\par Patient is here for a follow-up visit.  He is feeling well with no complaints.  Most recent CBC is stable.  Patient denies fever, chills, nausea, vomiting.  The light chains are picking up but it may be related to the CKD since LUCIA is negative and ratio is normal.\par \par 1/21/19\par He is here for follow-up with his wife.  His Kappa+Lambda have slightly risen, but the ratio is stable, so this is likely due to his CKD.   Most recent CBC is stable.  He received a prescription for Revlimid 5mg so he has been taking them every other day.  He is otherwise feeling well.  The rest of the CRAB criteria remains the same.  \par \par 2/25/19\par He is here with his wife.  As both his light chains are increasing, it is likely it is from his renal failure.  His most recent Hgb is 10.8g/dL, so we may stop when he nears 11.0g/dL.  He will see Dr. Rod on 3/25/19.   \par \par 3/20/19\par He is here for follow-up of MM with his wife.  His Hgb is stable at 11.3g/dL.  His BP is slightly elevated today. He will speak with his PCP.  He is otherwise feeling well.\par \par 4/22/19\par As of February, the immunofixation is picking up weak IgG Kappa bands no CRAB criteria  or significant rise in paraprotein that suggests progression .  We will continue with Revlimid at this time.   They went to see Dr. Rod from [facility] last month.   HE feesl well otherwise\par \par 5/20/19\par He is here for follow up. He is doing well. His HgB is coming down so we will restart Procrit today. MM panel from March stable.

## 2019-05-22 LAB
ALBUMIN MFR SERPL ELPH: 60.1 %
ALBUMIN SERPL-MCNC: 4 G/DL
ALBUMIN/GLOB SERPL: 1.5 RATIO
ALPHA1 GLOB MFR SERPL ELPH: 6 %
ALPHA1 GLOB SERPL ELPH-MCNC: 0.4 G/DL
ALPHA2 GLOB MFR SERPL ELPH: 11.1 %
ALPHA2 GLOB SERPL ELPH-MCNC: 0.7 G/DL
B-GLOBULIN MFR SERPL ELPH: 10.7 %
B-GLOBULIN SERPL ELPH-MCNC: 0.7 G/DL
DEPRECATED KAPPA LC FREE/LAMBDA SER: 1.65 RATIO
GAMMA GLOB FLD ELPH-MCNC: 0.8 G/DL
GAMMA GLOB MFR SERPL ELPH: 12.1 %
IGA SER QL IEP: 170 MG/DL
IGG SER QL IEP: 897 MG/DL
IGM SER QL IEP: 15 MG/DL
INTERPRETATION SERPL IEP-IMP: NORMAL
KAPPA LC CSF-MCNC: 4 MG/DL
KAPPA LC SERPL-MCNC: 6.6 MG/DL
M PROTEIN SPEC IFE-MCNC: NORMAL
PROT SERPL-MCNC: 6.6 G/DL

## 2019-06-03 ENCOUNTER — LABORATORY RESULT (OUTPATIENT)
Age: 69
End: 2019-06-03

## 2019-06-03 ENCOUNTER — RX RENEWAL (OUTPATIENT)
Age: 69
End: 2019-06-03

## 2019-06-03 ENCOUNTER — APPOINTMENT (OUTPATIENT)
Dept: INFUSION THERAPY | Facility: CLINIC | Age: 69
End: 2019-06-03

## 2019-06-03 LAB
HCT VFR BLD CALC: 33.5 %
HGB BLD-MCNC: 10.5 G/DL
MCHC RBC-ENTMCNC: 25.7 PG
MCHC RBC-ENTMCNC: 31.3 G/DL
MCV RBC AUTO: 81.9 FL
PLATELET # BLD AUTO: 166 K/UL
PMV BLD: 11.2 FL
RBC # BLD: 4.09 M/UL
RBC # FLD: 20.4 %
WBC # FLD AUTO: 7.24 K/UL

## 2019-06-03 RX ORDER — ERYTHROPOIETIN 10000 [IU]/ML
60000 INJECTION, SOLUTION INTRAVENOUS; SUBCUTANEOUS ONCE
Refills: 0 | Status: COMPLETED | OUTPATIENT
Start: 2019-06-03 | End: 2019-06-03

## 2019-06-03 RX ADMIN — ERYTHROPOIETIN 60000 UNIT(S): 10000 INJECTION, SOLUTION INTRAVENOUS; SUBCUTANEOUS at 09:18

## 2019-06-17 ENCOUNTER — APPOINTMENT (OUTPATIENT)
Dept: INFUSION THERAPY | Facility: CLINIC | Age: 69
End: 2019-06-17
Payer: MEDICARE

## 2019-06-17 ENCOUNTER — LABORATORY RESULT (OUTPATIENT)
Age: 69
End: 2019-06-17

## 2019-06-17 ENCOUNTER — APPOINTMENT (OUTPATIENT)
Dept: HEMATOLOGY ONCOLOGY | Facility: CLINIC | Age: 69
End: 2019-06-17
Payer: MEDICARE

## 2019-06-17 VITALS
RESPIRATION RATE: 16 BRPM | TEMPERATURE: 98.8 F | HEART RATE: 72 BPM | BODY MASS INDEX: 25.88 KG/M2 | SYSTOLIC BLOOD PRESSURE: 157 MMHG | DIASTOLIC BLOOD PRESSURE: 73 MMHG | WEIGHT: 161 LBS | HEIGHT: 66 IN

## 2019-06-17 LAB
ALBUMIN SERPL ELPH-MCNC: 3.9 G/DL
ALP BLD-CCNC: 57 U/L
ALT SERPL-CCNC: 9 U/L
ANION GAP SERPL CALC-SCNC: 15 MMOL/L
AST SERPL-CCNC: 10 U/L
BILIRUB SERPL-MCNC: <0.2 MG/DL
BUN SERPL-MCNC: 65 MG/DL
CALCIUM SERPL-MCNC: 9.9 MG/DL
CHLORIDE SERPL-SCNC: 103 MMOL/L
CO2 SERPL-SCNC: 20 MMOL/L
CREAT SERPL-MCNC: 6.2 MG/DL
GLUCOSE SERPL-MCNC: 125 MG/DL
HCT VFR BLD CALC: 38.5 %
HGB BLD-MCNC: 11.8 G/DL
MCHC RBC-ENTMCNC: 25.2 PG
MCHC RBC-ENTMCNC: 30.6 G/DL
MCV RBC AUTO: 82.3 FL
PLATELET # BLD AUTO: 213 K/UL
PMV BLD: 9.9 FL
POTASSIUM SERPL-SCNC: 4.5 MMOL/L
PROT SERPL-MCNC: 6.8 G/DL
RBC # BLD: 4.68 M/UL
RBC # FLD: 20.9 %
SODIUM SERPL-SCNC: 138 MMOL/L
WBC # FLD AUTO: 5.85 K/UL

## 2019-06-17 PROCEDURE — 99213 OFFICE O/P EST LOW 20 MIN: CPT

## 2019-06-17 NOTE — PHYSICAL EXAM
[Fully active, able to carry on all pre-disease performance without restriction] : Status 0 - Fully active, able to carry on all pre-disease performance without restriction [Normal] : affect appropriate [de-identified] : right side of face has healing lesion from cat, encouraged to go to derm

## 2019-06-17 NOTE — ASSESSMENT
[FreeTextEntry1] : #Free light chain multiple myeloma, specifically lambda.  Initial beta-2 was 27.1.  His bone marrow cytogenetics was normal.  He is status post four cycles of VCD.  This is followed by autologous bone marrow transplant in 07/2015,he has been on  weekly Velcade maintenance, He is also being followed by Dr. Krysta Rod in [place].  He currently takes his acyclovir.  Last myeloma panel  showed increasing lambda light chains but marrow showed minimal plasma cells with PET CT no progression.  HIs lambda continues to increase supporting biochemical recurrence with worsening creatinine  He was started on Daratumumab, Revlimid 5 mg daily d1-21 and  Dexamethasone  40 mg weekly PO with great response and is nos most likley in CR. Patient was taking Revlimid every other day and it was held for pancytopenia.  On 6/18/18 went on Revlimid maintenance . Achieved a VGPR/CR. Since 2/2019 Serum LUCIA is now positive thus Relapse from complete response,  but  no evidence of clinical relapse thus would keep going with Revlamid. If   affected light chain begins to rise constantly  will check PET/CT possible repeat marrow and will consider change in therapy, multiple options such as restating Esthela and going to pomalidomide with Dex or Pom Dex and Slam7 inhibition or Esthela, carfilzomib and dex or  clinical trial if available  \par - c/w Revlimid 2.5 mg every other day d1-21 every 28 days (takes 10 tabs) for now\par \par #.Chronic kidney disease from multiple myeloma.  He has not been on dialysis in the past but is no off.  \par - creatinine has been relatively stable.  He follows up with Dr. Allen.\par \par #.Normocytic anemia, related to chronic kidney disease and Revlimid will restart   Procrit  60,000 units every  other  week and s/p IV iron . \par - will keep saturation over 20% and Ferritin over 100\par - He does not need Procrit today.  We will hold Procrit temporarily, if he begins to drop under 10g/dL, we can resume Procrit 60,000 units every 2 weeks, until  Hgb <11 and SBP <140/80\par \par 4. Moderate to severe   thrombocytopenia most likely from Revlimid. \par - Resolved\par \par 5. HTN \par - on meds, will follow-up with his PCP for adjustment, stable today\par \par Shingle vaccine on hold we only have the live one awaiting Recombinant.\par \par RTC in 1 month

## 2019-06-17 NOTE — RESULTS/DATA
[FreeTextEntry1] :  Result Name Results Units Reference Range \par      PET CT WHOLE BODY TOP OF SKULL TO TI   SEE TEXT       \par     Patient Name: MOR, AUGUST : 1950\par Patient ID: [id number]\par Account: [id number]\par Patient Location: [facility]\par Accession: [id number]\par Procedure: PET CT WHOLE BODY TOP OF SKULL TO TIP OF TOES SUBSEQUENT [id number]\par Date of Exam: 2017 11:09 AM\par Attending Physician: JARVIS LOW\par Requesting Physician: JARVIS LOW MD\par Clinical History / Reason for exam: FDG PET CT STUDY:\par Reason for examination: Tumor imaging - PET with concurrently acquired CT for\par attenuation correction and anatomic localization;  top of the skull  to toes/\par [address] multiple myeloma, subsequent treatment strategy.\par ICD-10 code:C90.0\par History: 66-year-old male patient with history of multiple myeloma, last\par chemotherapy received was in 2017. Patient had bone biopsy on\par 2017.\par Blood glucose pre injection 102 mg/dL\par Technique:\par Approximately 45 minutes after the intravenous administration of 13.4  mCi\par 18-Fluorine FDG, whole body PET images were acquired from top of  the skull to\par toes. In addition, non-iv and non oral contrast, low dose, non - diagnostic CT\par was acquired for attenuation correction and anatomic correlation only.\par Findings:\par Comparison: Prior PET CT study done on July 3, 2015. Correlation was performed\par with the skeletal survey done on 2016.\par Head /Neck: Physiologic FDG uptake is seen in the visualized region of the\par brain, pharyngeal lymphoid tissue, and salivary glands.\par Chest:\par Physiologic FDG avid PET is seen in the mediastinal blood pool and myocardium.\par Chest wall: Unremarkable\par Lungs: No abnormal uptake.\par Mediastinum/Pleura/pericardium: No abnormal uptake.\par Thoracic/ axillary lymph nodes: No abnormal uptake.\par Hepatobiliary: Unremarkable.\par Gallbladder: Multiple gallstones.\par Spleen: No abnormal uptake\par Pancreas: No abnormal uptake.\par Adrenal glands: No abnormal uptake.\par Kidneys/ureters/bladder: Normal excretory activity is demonstrated.\par Abdominal pelvic lymph nodes: No abnormal uptake.\par Bowel/peritoneum/mesentery: No abnormal uptake.\par Pelvic organs: Unremarkable. No abnormal increased uptake. Prosthetic\par calcifications.\par Bones/soft tissues: Osteoarthritic changes are identified in the bones. \par Patient\par has multiple extensive lytic lesions in the skeletal, seen in the skeletal\par survey  for details see the report  on 2016.\par In the prior examination there was PET positive lytic lesion right fifth rib\par with a max SUV 2.6, now non-FDG avid   and L5 vertebral body on the left side\par with a max SUV 3.8, now non-FDG avid with multiple other scattered lytic\par lesions, non-FDG avid.\par No abnormal increased uptake is seen in the lower extremities.\par Impression:\par 1. No new areas of abnormal increased uptake is seen.\par 2. Previously seen abnormal increased uptake seen in the right fifth rib and \par L5\par vertebral body on the left side, at this time both regions are non-FDG avid.\par 3. Multiple extensive lytic lesions in the skeletal consistent with multiple\par myeloma.\par 4. No other areas of abnormal increased uptake is seen.\par I the attending attest that I have personally reviewed these images and agree\par with this report.\par \par  \par

## 2019-06-17 NOTE — CONSULT LETTER
[Referral Closing:] : Thank you very much for seeing this patient.  If you have any questions, please do not hesitate to contact me. [Dear  ___] : Dear  [unfilled], [Sincerely,] : Sincerely, [DrOrquidea  ___] : Dr. RECIO [Courtesy Letter:] : I had the pleasure of seeing your patient, [unfilled], in my office today. [FreeTextEntry3] : Andrew Pantoja

## 2019-06-17 NOTE — HISTORY OF PRESENT ILLNESS
[Therapy: ___] : Therapy: [unfilled] [de-identified] : REASON FOR FOLLOWUP:  Maintenance Velcade for lambda light chain myeloma, status post autologous stem cell transplant.\par \par TREATMENT HISTORY:  On 05/02/2015, he was diagnosed with lambda light chain multiple myeloma when he had a syncopal episode in [place] and was found to be in acute renal failure.  He underwent a bone marrow biopsy that confirmed lambda light chain multiple myeloma.  His beta2 microglobulin was initially 27.1 on diagnosis.  His lambda light chain prior to treatment was as high as 05275.2 on 02/27/2015.  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  There was also diffuse acute tubular injury and modular tubular atrophy with interstitial fibrosis initially on dialysis, but currently been off dialysis for approximately one year.\par \par His treatment summary is as follows.  He initiated Velcade, Cytoxan, and dexamethasone, cycle started on 03/13/2015.  He completed four cycles on 05/08/2015.  He then underwent an autologous bone marrow transplant in 07/2015 at [place].  He has been on maintenance Velcade but in 6/17 was started on DRD due to  rising lambda with normal kappa.\par \par Mr. Motta is a very pleasant 66yearold gentleman with history of lambda light chain multiple myeloma, treated as above.\par  [FreeTextEntry1] : DRD started  on 6/21/17 went on  Revlimid maintenance 6/18/18 [de-identified] : He presents today for followup she is currently on maintenance Velcade.his blood work from October 27 demonstrated a stage a faint lambda band on urine immunofixation. Nomi a count 7.66 hemoglobin 8.3 platelet count of 132. The SPEP was normal. Creatinine was stable at 5.93 potassium was 5.2 total protein 5.9 unremarkable LFTs free kappa lambda ratio normal 1.27 with free kappa at 42 and free lambda at 33.1. Most recent blood work from 1110 2016 shows a hemoglobin of 7.8. \par \par he was recently seen by Dr. Rod in  [place]. He had blood work done and reports that everything was stable. Dr. Rod also recommended to decrease his Velcade dose to 1.3 mg/m2\par \par He has no complaints. He denies any neuropathy.\par \par 1/4/17\par Presents for follow up today. Doing well on Maintenance Velcade.  No complaints. No neuropathy. \par \par 2/1/17\par He presents for follow up today. He has no complaints. Tolerating Velcade well.\par \par 4/5/17\par Doing well No complaints blood work from last visit was stable.\par \par 5/1/17\par He presents for follow up. He had increasing lambda light chains. Repeat was stable. A bone marrow showed minimal plasma cells and PET CT did not show any new activity.  He has no complaints.\par \par 6/5/2017\par Presents for follow up. He is doing well. He has some fatigue. His  Lambda has been slowly increasing. He has no other complaints.  No neuropathy\par \par 6/12/17\par He was brought back to discuss his lab work. His Lambda has been increasing with stable Kappa as well as slight worsening of his of creatinine He has no complaints.\par \par 7/12/17\par He has been on DRD. His HgB dropped to 5.9 and he is s/p PRBC transfusion.  He finished Revlimid on 7/9. His only complaint is leg crams at night. \par \par 8/9/17\par He is here for follow up. His cramps went away with the lower dose of Revlimid. His His lambda is coming down. He saw Dr. Rod last week as per patient.\par \par 8/30/27\par He is doing well. His legs cramps are better. He has severe anemia but he is asymptomatic. HIs creatinine is better and his light chain is at baseline.\par \par 9/27/17\par He is here for follow up. He needed  blood transfusion and hgb has been stable at 8 since than. He is also status post Venofer x2. He is on Procrit\par  40,000 units every 2 weeks. He feels well otherwise. \par \par 11/20/17\par Patient feels well and has no complaints. States he was taking Revlimid 5mg q48hrs prior to stopping for low counts. Patient will get Procrit and Darzalex. Patient will be restarted on Revilimid 2.5mg. Patient will continue every 2week Darzalex until week 25 then it becomes every 4 weeks. \par \par 12/18/17\par He is here for follow up. He is doing well. Blood work from last month still in CR. Cr and counts are better. He has no complaints and feel great.\par \par 1/22/18\par He is here for follow up. He is recovering from a cold. He is taking a prednisone taper given by his PMD. Had mules pain from cough.\par \par 2/26/18\par He is doing well. He has no complaints. His labs have been stable his light chain only went up slightly but ratio is normal. \par \par 3/27/18\par He is here for follow up he is doing well. He had blood work in [facility]  where he saw Dr. Rod 3/26/18 WBC 6.7, HgB 11.3, Pts 140, LFT WNL, , Ca 9.7. Cr. 5.96, alb 3.2\par \par He feels well. \par \par 4/23/18\par No events feels well. No complaints\par \par 5/21/18\par He is here for follow up. Doing well. Has no complaints\par \par 6/18/18\par His hgb has been dropping on monthly Procrit, His ligh chains are normal last Serum LUCIA from april 23 showed IgG kappa band thus he achieved aVGPR for DRD. He has no complaints.\par \par 7/16/18\par He is here for follow up. Doing well. No complaints. His kitten bit him.\par \par 8/13/18\par He is doing well. No complains. His HGb has been over 11 gm/dl So we are holding his procrit.\par \par 9/10/18\par He is here for follow up. He Myleoma panel is stable. Hgb is stable. He is due for his vaccinations 2 years post Auto and will have them today. He started Revlimid about 1 week ago. He has no complaints.\par \par 10/10/18\par He is here for follow up. He is doing well. BP is a bit high will watch. He has no complaints. He has his revlamid.\par \par 11/7/18\par He is here for follow up. His last hgb was under 10 gm/dl.  He is doing well otherwise. Was started on something for his blood pressure by Dr. Allen but he does not know what it is. He has his Revlimid\par \par 12/10/18\par Patient is here for a follow-up visit.  He is feeling well with no complaints.  Most recent CBC is stable.  Patient denies fever, chills, nausea, vomiting.  The light chains are picking up but it may be related to the CKD since LUCIA is negative and ratio is normal.\par \par 1/21/19\par He is here for follow-up with his wife.  His Kappa+Lambda have slightly risen, but the ratio is stable, so this is likely due to his CKD.   Most recent CBC is stable.  He received a prescription for Revlimid 5mg so he has been taking them every other day.  He is otherwise feeling well.  The rest of the CRAB criteria remains the same.  \par \par 2/25/19\par He is here with his wife.  As both his light chains are increasing, it is likely it is from his renal failure.  His most recent Hgb is 10.8g/dL, so we may stop when he nears 11.0g/dL.  He will see Dr. Rod on 3/25/19.   \par \par 3/20/19\par He is here for follow-up of MM with his wife.  His Hgb is stable at 11.3g/dL.  His BP is slightly elevated today. He will speak with his PCP.  He is otherwise feeling well.\par \par 4/22/19\par As of February, the immunofixation is picking up weak IgG Kappa bands no CRAB criteria  or significant rise in paraprotein that suggests progression .  We will continue with Revlimid at this time.   They went to see Dr. Rod from [facility] last month.   He feels well otherwise.\par \par 5/20/19\par He is here for follow up. He is doing well. His HgB is coming down so we will restart Procrit today. MM panel from March stable.\par \par 6/17/19\par He is here for follow-up of MM with his wife.  The MM panel from May was stable.  His hgb is 11.8g/dL today, so he does not need procrit.  He is feeling well.

## 2019-06-18 LAB
ALBUMIN MFR SERPL ELPH: 60.7 %
ALBUMIN SERPL-MCNC: 4.1 G/DL
ALBUMIN/GLOB SERPL: 1.5 RATIO
ALPHA1 GLOB MFR SERPL ELPH: 5.7 %
ALPHA1 GLOB SERPL ELPH-MCNC: 0.4 G/DL
ALPHA2 GLOB MFR SERPL ELPH: 10.6 %
ALPHA2 GLOB SERPL ELPH-MCNC: 0.7 G/DL
B-GLOBULIN MFR SERPL ELPH: 10.6 %
B-GLOBULIN SERPL ELPH-MCNC: 0.7 G/DL
DEPRECATED KAPPA LC FREE/LAMBDA SER: 1.67 RATIO
GAMMA GLOB FLD ELPH-MCNC: 0.8 G/DL
GAMMA GLOB MFR SERPL ELPH: 12.4 %
IGA SER QL IEP: 187 MG/DL
IGG SER QL IEP: 946 MG/DL
IGM SER QL IEP: 17 MG/DL
INTERPRETATION SERPL IEP-IMP: NORMAL
KAPPA LC CSF-MCNC: 4.42 MG/DL
KAPPA LC SERPL-MCNC: 7.38 MG/DL
M PROTEIN SPEC IFE-MCNC: NORMAL
PROT SERPL-MCNC: 6.8 G/DL

## 2019-07-08 ENCOUNTER — APPOINTMENT (OUTPATIENT)
Dept: INFUSION THERAPY | Facility: CLINIC | Age: 69
End: 2019-07-08

## 2019-07-15 ENCOUNTER — LABORATORY RESULT (OUTPATIENT)
Age: 69
End: 2019-07-15

## 2019-07-15 ENCOUNTER — APPOINTMENT (OUTPATIENT)
Dept: HEMATOLOGY ONCOLOGY | Facility: CLINIC | Age: 69
End: 2019-07-15
Payer: MEDICARE

## 2019-07-15 ENCOUNTER — OUTPATIENT (OUTPATIENT)
Dept: OUTPATIENT SERVICES | Facility: HOSPITAL | Age: 69
LOS: 1 days | Discharge: HOME | End: 2019-07-15

## 2019-07-15 VITALS
TEMPERATURE: 98.1 F | DIASTOLIC BLOOD PRESSURE: 70 MMHG | RESPIRATION RATE: 16 BRPM | HEART RATE: 76 BPM | WEIGHT: 162 LBS | HEIGHT: 66 IN | SYSTOLIC BLOOD PRESSURE: 135 MMHG | BODY MASS INDEX: 26.03 KG/M2

## 2019-07-15 DIAGNOSIS — D63.1 ANEMIA IN CHRONIC KIDNEY DISEASE: ICD-10-CM

## 2019-07-15 DIAGNOSIS — C90.00 MULTIPLE MYELOMA NOT HAVING ACHIEVED REMISSION: ICD-10-CM

## 2019-07-15 DIAGNOSIS — N18.9 CHRONIC KIDNEY DISEASE, UNSPECIFIED: ICD-10-CM

## 2019-07-15 DIAGNOSIS — C90.02 MULTIPLE MYELOMA IN RELAPSE: ICD-10-CM

## 2019-07-15 LAB
ALBUMIN SERPL ELPH-MCNC: 3.9 G/DL
ALP BLD-CCNC: 58 U/L
ALT SERPL-CCNC: 11 U/L
ANION GAP SERPL CALC-SCNC: 17 MMOL/L
AST SERPL-CCNC: 10 U/L
BILIRUB SERPL-MCNC: <0.2 MG/DL
BUN SERPL-MCNC: 67 MG/DL
CALCIUM SERPL-MCNC: 10.2 MG/DL
CHLORIDE SERPL-SCNC: 108 MMOL/L
CO2 SERPL-SCNC: 16 MMOL/L
CREAT SERPL-MCNC: 5.8 MG/DL
GLUCOSE SERPL-MCNC: 117 MG/DL
HCT VFR BLD CALC: 33.8 %
HGB BLD-MCNC: 10.6 G/DL
MCHC RBC-ENTMCNC: 25.2 PG
MCHC RBC-ENTMCNC: 31.4 G/DL
MCV RBC AUTO: 80.3 FL
PLATELET # BLD AUTO: 221 K/UL
PMV BLD: 9.9 FL
POTASSIUM SERPL-SCNC: 4.4 MMOL/L
PROT SERPL-MCNC: 6.6 G/DL
RBC # BLD: 4.21 M/UL
RBC # FLD: 18.5 %
SODIUM SERPL-SCNC: 141 MMOL/L
WBC # FLD AUTO: 6.85 K/UL

## 2019-07-15 PROCEDURE — 99213 OFFICE O/P EST LOW 20 MIN: CPT

## 2019-07-17 LAB
ALBUMIN MFR SERPL ELPH: 59 %
ALBUMIN SERPL-MCNC: 4 G/DL
ALBUMIN/GLOB SERPL: 1.4 RATIO
ALPHA1 GLOB MFR SERPL ELPH: 6.2 %
ALPHA1 GLOB SERPL ELPH-MCNC: 0.4 G/DL
ALPHA2 GLOB MFR SERPL ELPH: 12.1 %
ALPHA2 GLOB SERPL ELPH-MCNC: 0.8 G/DL
B-GLOBULIN MFR SERPL ELPH: 10.6 %
B-GLOBULIN SERPL ELPH-MCNC: 0.7 G/DL
DEPRECATED KAPPA LC FREE/LAMBDA SER: 1.65 RATIO
GAMMA GLOB FLD ELPH-MCNC: 0.8 G/DL
GAMMA GLOB MFR SERPL ELPH: 12.1 %
IGA SER QL IEP: 184 MG/DL
IGG SER QL IEP: 1003 MG/DL
IGM SER QL IEP: 16 MG/DL
INTERPRETATION SERPL IEP-IMP: NORMAL
KAPPA LC CSF-MCNC: 4.2 MG/DL
KAPPA LC SERPL-MCNC: 6.95 MG/DL
M PROTEIN SPEC IFE-MCNC: NORMAL
PROT SERPL-MCNC: 6.8 G/DL
PROT SERPL-MCNC: 6.8 G/DL

## 2019-07-18 NOTE — PHYSICAL EXAM
[Fully active, able to carry on all pre-disease performance without restriction] : Status 0 - Fully active, able to carry on all pre-disease performance without restriction [Normal] : affect appropriate [de-identified] : right side of face has healing lesion from cat, encouraged to go to derm

## 2019-07-18 NOTE — RESULTS/DATA
[FreeTextEntry1] :  Result Name Results Units Reference Range \par      PET CT WHOLE BODY TOP OF SKULL TO TI   SEE TEXT       \par     Patient Name: MOR, AUGUST : 1950\par Patient ID: [id number]\par Account: [id number]\par Patient Location: [facility]\par Accession: [id number]\par Procedure: PET CT WHOLE BODY TOP OF SKULL TO TIP OF TOES SUBSEQUENT [id number]\par Date of Exam: 2017 11:09 AM\par Attending Physician: JARVIS LOW\par Requesting Physician: JARVIS LOW MD\par Clinical History / Reason for exam: FDG PET CT STUDY:\par Reason for examination: Tumor imaging - PET with concurrently acquired CT for\par attenuation correction and anatomic localization;  top of the skull  to toes/\par 28022 multiple myeloma, subsequent treatment strategy.\par ICD-10 code:C90.0\par History: 66-year-old male patient with history of multiple myeloma, last\par chemotherapy received was in 2017. Patient had bone biopsy on\par 2017.\par Blood glucose pre injection 102 mg/dL\par Technique:\par Approximately 45 minutes after the intravenous administration of 13.4  mCi\par 18-Fluorine FDG, whole body PET images were acquired from top of  the skull to\par toes. In addition, non-iv and non oral contrast, low dose, non - diagnostic CT\par was acquired for attenuation correction and anatomic correlation only.\par Findings:\par Comparison: Prior PET CT study done on July 3, 2015. Correlation was performed\par with the skeletal survey done on 2016.\par Head /Neck: Physiologic FDG uptake is seen in the visualized region of the\par brain, pharyngeal lymphoid tissue, and salivary glands.\par Chest:\par Physiologic FDG avid PET is seen in the mediastinal blood pool and myocardium.\par Chest wall: Unremarkable\par Lungs: No abnormal uptake.\par Mediastinum/Pleura/pericardium: No abnormal uptake.\par Thoracic/ axillary lymph nodes: No abnormal uptake.\par Hepatobiliary: Unremarkable.\par Gallbladder: Multiple gallstones.\par Spleen: No abnormal uptake\par Pancreas: No abnormal uptake.\par Adrenal glands: No abnormal uptake.\par Kidneys/ureters/bladder: Normal excretory activity is demonstrated.\par Abdominal pelvic lymph nodes: No abnormal uptake.\par Bowel/peritoneum/mesentery: No abnormal uptake.\par Pelvic organs: Unremarkable. No abnormal increased uptake. Prosthetic\par calcifications.\par Bones/soft tissues: Osteoarthritic changes are identified in the bones. \par Patient\par has multiple extensive lytic lesions in the skeletal, seen in the skeletal\par survey  for details see the report  on 2016.\par In the prior examination there was PET positive lytic lesion right fifth rib\par with a max SUV 2.6, now non-FDG avid   and L5 vertebral body on the left side\par with a max SUV 3.8, now non-FDG avid with multiple other scattered lytic\par lesions, non-FDG avid.\par No abnormal increased uptake is seen in the lower extremities.\par Impression:\par 1. No new areas of abnormal increased uptake is seen.\par 2. Previously seen abnormal increased uptake seen in the right fifth rib and \par L5\par vertebral body on the left side, at this time both regions are non-FDG avid.\par 3. Multiple extensive lytic lesions in the skeletal consistent with multiple\par myeloma.\par 4. No other areas of abnormal increased uptake is seen.\par I the attending attest that I have personally reviewed these images and agree\par with this report.\par \par  \par

## 2019-07-18 NOTE — CONSULT LETTER
[Dear  ___] : Dear  [unfilled], [Referral Closing:] : Thank you very much for seeing this patient.  If you have any questions, please do not hesitate to contact me. [Courtesy Letter:] : I had the pleasure of seeing your patient, [unfilled], in my office today. [Sincerely,] : Sincerely, [DrOrquidea  ___] : Dr. RECIO [FreeTextEntry3] : Andrew Pantoja

## 2019-07-18 NOTE — HISTORY OF PRESENT ILLNESS
[Therapy: ___] : Therapy: [unfilled] [de-identified] : REASON FOR FOLLOWUP:  Maintenance Velcade for lambda light chain myeloma, status post autologous stem cell transplant.\par \par TREATMENT HISTORY:  On 05/02/2015, he was diagnosed with lambda light chain multiple myeloma when he had a syncopal episode in [place] and was found to be in acute renal failure.  He underwent a bone marrow biopsy that confirmed lambda light chain multiple myeloma.  His beta2 microglobulin was initially 27.1 on diagnosis.  His lambda light chain prior to treatment was as high as 36734.2 on 02/27/2015.  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  There was also diffuse acute tubular injury and modular tubular atrophy with interstitial fibrosis initially on dialysis, but currently been off dialysis for approximately one year.\par \par His treatment summary is as follows.  He initiated Velcade, Cytoxan, and dexamethasone, cycle started on 03/13/2015.  He completed four cycles on 05/08/2015.  He then underwent an autologous bone marrow transplant in 07/2015 at [place].  He has been on maintenance Velcade but in 6/17 was started on DRD due to  rising lambda with normal kappa.\par \par Mr. Motta is a very pleasant 66yearold gentleman with history of lambda light chain multiple myeloma, treated as above.\par  [FreeTextEntry1] : DRD started  on 6/21/17 went on  Revlimid maintenance 6/18/18 [de-identified] : He presents today for followup she is currently on maintenance Velcade.his blood work from October 27 demonstrated a stage a faint lambda band on urine immunofixation. Nomi a count 7.66 hemoglobin 8.3 platelet count of 132. The SPEP was normal. Creatinine was stable at 5.93 potassium was 5.2 total protein 5.9 unremarkable LFTs free kappa lambda ratio normal 1.27 with free kappa at 42 and free lambda at 33.1. Most recent blood work from 1110 2016 shows a hemoglobin of 7.8. \par \par he was recently seen by Dr. Rod in  [place]. He had blood work done and reports that everything was stable. Dr. Rod also recommended to decrease his Velcade dose to 1.3 mg/m2\par \par He has no complaints. He denies any neuropathy.\par \par 1/4/17\par Presents for follow up today. Doing well on Maintenance Velcade.  No complaints. No neuropathy. \par \par 2/1/17\par He presents for follow up today. He has no complaints. Tolerating Velcade well.\par \par 4/5/17\par Doing well No complaints blood work from last visit was stable.\par \par 5/1/17\par He presents for follow up. He had increasing lambda light chains. Repeat was stable. A bone marrow showed minimal plasma cells and PET CT did not show any new activity.  He has no complaints.\par \par 6/5/2017\par Presents for follow up. He is doing well. He has some fatigue. His  Lambda has been slowly increasing. He has no other complaints.  No neuropathy\par \par 6/12/17\par He was brought back to discuss his lab work. His Lambda has been increasing with stable Kappa as well as slight worsening of his of creatinine He has no complaints.\par \par 7/12/17\par He has been on DRD. His HgB dropped to 5.9 and he is s/p PRBC transfusion.  He finished Revlimid on 7/9. His only complaint is leg crams at night. \par \par 8/9/17\par He is here for follow up. His cramps went away with the lower dose of Revlimid. His His lambda is coming down. He saw Dr. Rod last week as per patient.\par \par 8/30/27\par He is doing well. His legs cramps are better. He has severe anemia but he is asymptomatic. HIs creatinine is better and his light chain is at baseline.\par \par 9/27/17\par He is here for follow up. He needed  blood transfusion and hgb has been stable at 8 since than. He is also status post Venofer x2. He is on Procrit\par  40,000 units every 2 weeks. He feels well otherwise. \par \par 11/20/17\par Patient feels well and has no complaints. States he was taking Revlimid 5mg q48hrs prior to stopping for low counts. Patient will get Procrit and Darzalex. Patient will be restarted on Revilimid 2.5mg. Patient will continue every 2week Darzalex until week 25 then it becomes every 4 weeks. \par \par 12/18/17\par He is here for follow up. He is doing well. Blood work from last month still in CR. Cr and counts are better. He has no complaints and feel great.\par \par 1/22/18\par He is here for follow up. He is recovering from a cold. He is taking a prednisone taper given by his PMD. Had mules pain from cough.\par \par 2/26/18\par He is doing well. He has no complaints. His labs have been stable his light chain only went up slightly but ratio is normal. \par \par 3/27/18\par He is here for follow up he is doing well. He had blood work in [facility]  where he saw Dr. Rod 3/26/18 WBC 6.7, HgB 11.3, Pts 140, LFT WNL, , Ca 9.7. Cr. 5.96, alb 3.2\par \par He feels well. \par \par 4/23/18\par No events feels well. No complaints\par \par 5/21/18\par He is here for follow up. Doing well. Has no complaints\par \par 6/18/18\par His hgb has been dropping on monthly Procrit, His ligh chains are normal last Serum LUCIA from april 23 showed IgG kappa band thus he achieved aVGPR for DRD. He has no complaints.\par \par 7/16/18\par He is here for follow up. Doing well. No complaints. His kitten bit him.\par \par 8/13/18\par He is doing well. No complains. His HGb has been over 11 gm/dl So we are holding his procrit.\par \par 9/10/18\par He is here for follow up. He Myleoma panel is stable. Hgb is stable. He is due for his vaccinations 2 years post Auto and will have them today. He started Revlimid about 1 week ago. He has no complaints.\par \par 10/10/18\par He is here for follow up. He is doing well. BP is a bit high will watch. He has no complaints. He has his revlamid.\par \par 11/7/18\par He is here for follow up. His last hgb was under 10 gm/dl.  He is doing well otherwise. Was started on something for his blood pressure by Dr. Allen but he does not know what it is. He has his Revlimid\par \par 12/10/18\par Patient is here for a follow-up visit.  He is feeling well with no complaints.  Most recent CBC is stable.  Patient denies fever, chills, nausea, vomiting.  The light chains are picking up but it may be related to the CKD since LUCIA is negative and ratio is normal.\par \par 1/21/19\par He is here for follow-up with his wife.  His Kappa+Lambda have slightly risen, but the ratio is stable, so this is likely due to his CKD.   Most recent CBC is stable.  He received a prescription for Revlimid 5mg so he has been taking them every other day.  He is otherwise feeling well.  The rest of the CRAB criteria remains the same.  \par \par 2/25/19\par He is here with his wife.  As both his light chains are increasing, it is likely it is from his renal failure.  His most recent Hgb is 10.8g/dL, so we may stop when he nears 11.0g/dL.  He will see Dr. Rod on 3/25/19.   \par \par 3/20/19\par He is here for follow-up of MM with his wife.  His Hgb is stable at 11.3g/dL.  His BP is slightly elevated today. He will speak with his PCP.  He is otherwise feeling well.\par \par 4/22/19\par As of February, the immunofixation is picking up weak IgG Kappa bands no CRAB criteria  or significant rise in paraprotein that suggests progression .  We will continue with Revlimid at this time.   They went to see Dr. Rod from [facility] last month.   He feels well otherwise.\par \par 5/20/19\par He is here for follow up. He is doing well. His HgB is coming down so we will restart Procrit today. MM panel from March stable.\par \par 6/17/19\par He is here for follow-up of MM with his wife.  The MM panel from May was stable.  His hgb is 11.8g/dL today, so he does not need procrit.  He is feeling well. \par \par 7/15/19\par He is here for follow up. His  Myeloma panel from June is stable, Serum LUCIA remains negative.

## 2019-07-29 ENCOUNTER — RX RENEWAL (OUTPATIENT)
Age: 69
End: 2019-07-29

## 2019-08-12 ENCOUNTER — APPOINTMENT (OUTPATIENT)
Dept: HEMATOLOGY ONCOLOGY | Facility: CLINIC | Age: 69
End: 2019-08-12
Payer: MEDICARE

## 2019-08-12 ENCOUNTER — LABORATORY RESULT (OUTPATIENT)
Age: 69
End: 2019-08-12

## 2019-08-12 VITALS
WEIGHT: 160 LBS | DIASTOLIC BLOOD PRESSURE: 74 MMHG | HEART RATE: 63 BPM | BODY MASS INDEX: 25.71 KG/M2 | HEIGHT: 66 IN | SYSTOLIC BLOOD PRESSURE: 146 MMHG | TEMPERATURE: 96.8 F | RESPIRATION RATE: 16 BRPM

## 2019-08-12 LAB
ALBUMIN SERPL ELPH-MCNC: 4.2 G/DL
ALP BLD-CCNC: 60 U/L
ALT SERPL-CCNC: 10 U/L
ANION GAP SERPL CALC-SCNC: 16 MMOL/L
AST SERPL-CCNC: 12 U/L
BILIRUB SERPL-MCNC: <0.2 MG/DL
BUN SERPL-MCNC: 73 MG/DL
CALCIUM SERPL-MCNC: 10.2 MG/DL
CHLORIDE SERPL-SCNC: 107 MMOL/L
CO2 SERPL-SCNC: 17 MMOL/L
CREAT SERPL-MCNC: 6.2 MG/DL
GLUCOSE SERPL-MCNC: 113 MG/DL
HCT VFR BLD CALC: 35.2 %
HGB BLD-MCNC: 10.9 G/DL
MCHC RBC-ENTMCNC: 24.8 PG
MCHC RBC-ENTMCNC: 31 G/DL
MCV RBC AUTO: 80 FL
PLATELET # BLD AUTO: 201 K/UL
PMV BLD: 10.8 FL
POTASSIUM SERPL-SCNC: 4.2 MMOL/L
PROT SERPL-MCNC: 6.5 G/DL
RBC # BLD: 4.4 M/UL
RBC # FLD: 17.6 %
SODIUM SERPL-SCNC: 140 MMOL/L
WBC # FLD AUTO: 5.27 K/UL

## 2019-08-12 PROCEDURE — 99213 OFFICE O/P EST LOW 20 MIN: CPT

## 2019-08-12 NOTE — PHYSICAL EXAM
[Fully active, able to carry on all pre-disease performance without restriction] : Status 0 - Fully active, able to carry on all pre-disease performance without restriction [Normal] : grossly intact [de-identified] : right side of face has healing lesion from cat, encouraged to go to derm

## 2019-08-12 NOTE — ASSESSMENT
[FreeTextEntry1] : #Free light chain multiple myeloma, specifically lambda.  Initial beta-2 was 27.1.  His bone marrow cytogenetics was normal.  He is status post four cycles of VCD.  This is followed by autologous bone marrow transplant in 07/2015,he has been on  weekly Velcade maintenance, He is also being followed by Dr. Krysta Rod in [place].  He currently takes his acyclovir.  Last myeloma panel  showed increasing lambda light chains but marrow showed minimal plasma cells with PET CT no progression.  HIs lambda continues to increase supporting biochemical recurrence with worsening creatinine  He was started on Daratumumab, Revlimid 5 mg daily d1-21 and  Dexamethasone  40 mg weekly PO with great response and is nos most likley in CR. Patient was taking Revlimid every other day and it was held for pancytopenia.  On 6/18/18 went on Revlimid maintenance . Achieved a VGPR/CR. Since 2/2019 Serum LUCIA is now positive thus Relapse from complete response,  but  no evidence of clinical relapse thus would keep going with Revlamid. If   affected light chain begins to rise constantly  will check PET/CT possible repeat marrow and will consider change in therapy, multiple options such as restating Esthela and going to pomalidomide with Dex or Pom Dex and Slam7 inhibition or Esthela, carfilzomib and dex or  clinical trial if available  \par - c/w Revlimid 2.5 mg every other day d1-21 every 28 days (takes 10 tabs) for now\par \par \par #.Chronic kidney disease from multiple myeloma.  He has not been on dialysis in the past but is no off.  \par - creatinine has been relatively stable.  He follows up with Dr. Allen.\par \par #.Normocytic anemia, related to chronic kidney disease and Revlimid was on    Procrit  60,000 units every  other  week and s/p IV iron . \par - will keep saturation over 20% and Ferritin over 100\par -hgB stable will watch him off Procrit \par \par 4. Moderate to severe   thrombocytopenia most likely from Revlimid. \par - Resolved\par \par 5. HTN \par - on meds, will follow-up with his PCP for adjustment, stable today\par \par \par RTC in 1 month

## 2019-08-12 NOTE — HISTORY OF PRESENT ILLNESS
[Therapy: ___] : Therapy: [unfilled] [de-identified] : REASON FOR FOLLOWUP:  Maintenance Velcade for lambda light chain myeloma, status post autologous stem cell transplant.\par \par TREATMENT HISTORY:  On 05/02/2015, he was diagnosed with lambda light chain multiple myeloma when he had a syncopal episode in [place] and was found to be in acute renal failure.  He underwent a bone marrow biopsy that confirmed lambda light chain multiple myeloma.  His beta2 microglobulin was initially 27.1 on diagnosis.  His lambda light chain prior to treatment was as high as 83981.2 on 02/27/2015.  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  There was also diffuse acute tubular injury and modular tubular atrophy with interstitial fibrosis initially on dialysis, but currently been off dialysis for approximately one year.\par \par His treatment summary is as follows.  He initiated Velcade, Cytoxan, and dexamethasone, cycle started on 03/13/2015.  He completed four cycles on 05/08/2015.  He then underwent an autologous bone marrow transplant in 07/2015 at [place].  He has been on maintenance Velcade but in 6/17 was started on DRD due to  rising lambda with normal kappa.\par \par Mr. Motta is a very pleasant 66yearold gentleman with history of lambda light chain multiple myeloma, treated as above.\par  [FreeTextEntry1] : DRD started  on 6/21/17 went on  Revlimid maintenance 6/18/18 [de-identified] : He presents today for followup she is currently on maintenance Velcade.his blood work from October 27 demonstrated a stage a faint lambda band on urine immunofixation. Nomi a count 7.66 hemoglobin 8.3 platelet count of 132. The SPEP was normal. Creatinine was stable at 5.93 potassium was 5.2 total protein 5.9 unremarkable LFTs free kappa lambda ratio normal 1.27 with free kappa at 42 and free lambda at 33.1. Most recent blood work from 1110 2016 shows a hemoglobin of 7.8. \par \par he was recently seen by Dr. Rod in  [place]. He had blood work done and reports that everything was stable. Dr. Rod also recommended to decrease his Velcade dose to 1.3 mg/m2\par \par He has no complaints. He denies any neuropathy.\par \par 1/4/17\par Presents for follow up today. Doing well on Maintenance Velcade.  No complaints. No neuropathy. \par \par 2/1/17\par He presents for follow up today. He has no complaints. Tolerating Velcade well.\par \par 4/5/17\par Doing well No complaints blood work from last visit was stable.\par \par 5/1/17\par He presents for follow up. He had increasing lambda light chains. Repeat was stable. A bone marrow showed minimal plasma cells and PET CT did not show any new activity.  He has no complaints.\par \par 6/5/2017\par Presents for follow up. He is doing well. He has some fatigue. His  Lambda has been slowly increasing. He has no other complaints.  No neuropathy\par \par 6/12/17\par He was brought back to discuss his lab work. His Lambda has been increasing with stable Kappa as well as slight worsening of his of creatinine He has no complaints.\par \par 7/12/17\par He has been on DRD. His HgB dropped to 5.9 and he is s/p PRBC transfusion.  He finished Revlimid on 7/9. His only complaint is leg crams at night. \par \par 8/9/17\par He is here for follow up. His cramps went away with the lower dose of Revlimid. His His lambda is coming down. He saw Dr. Rod last week as per patient.\par \par 8/30/27\par He is doing well. His legs cramps are better. He has severe anemia but he is asymptomatic. HIs creatinine is better and his light chain is at baseline.\par \par 9/27/17\par He is here for follow up. He needed  blood transfusion and hgb has been stable at 8 since than. He is also status post Venofer x2. He is on Procrit\par  40,000 units every 2 weeks. He feels well otherwise. \par \par 11/20/17\par Patient feels well and has no complaints. States he was taking Revlimid 5mg q48hrs prior to stopping for low counts. Patient will get Procrit and Darzalex. Patient will be restarted on Revilimid 2.5mg. Patient will continue every 2week Darzalex until week 25 then it becomes every 4 weeks. \par \par 12/18/17\par He is here for follow up. He is doing well. Blood work from last month still in CR. Cr and counts are better. He has no complaints and feel great.\par \par 1/22/18\par He is here for follow up. He is recovering from a cold. He is taking a prednisone taper given by his PMD. Had mules pain from cough.\par \par 2/26/18\par He is doing well. He has no complaints. His labs have been stable his light chain only went up slightly but ratio is normal. \par \par 3/27/18\par He is here for follow up he is doing well. He had blood work in [facility]  where he saw Dr. Rod 3/26/18 WBC 6.7, HgB 11.3, Pts 140, LFT WNL, , Ca 9.7. Cr. 5.96, alb 3.2\par \par He feels well. \par \par 4/23/18\par No events feels well. No complaints\par \par 5/21/18\par He is here for follow up. Doing well. Has no complaints\par \par 6/18/18\par His hgb has been dropping on monthly Procrit, His ligh chains are normal last Serum LUCIA from april 23 showed IgG kappa band thus he achieved aVGPR for DRD. He has no complaints.\par \par 7/16/18\par He is here for follow up. Doing well. No complaints. His kitten bit him.\par \par 8/13/18\par He is doing well. No complains. His HGb has been over 11 gm/dl So we are holding his procrit.\par \par 9/10/18\par He is here for follow up. He Myleoma panel is stable. Hgb is stable. He is due for his vaccinations 2 years post Auto and will have them today. He started Revlimid about 1 week ago. He has no complaints.\par \par 10/10/18\par He is here for follow up. He is doing well. BP is a bit high will watch. He has no complaints. He has his revlamid.\par \par 11/7/18\par He is here for follow up. His last hgb was under 10 gm/dl.  He is doing well otherwise. Was started on something for his blood pressure by Dr. Allen but he does not know what it is. He has his Revlimid\par \par 12/10/18\par Patient is here for a follow-up visit.  He is feeling well with no complaints.  Most recent CBC is stable.  Patient denies fever, chills, nausea, vomiting.  The light chains are picking up but it may be related to the CKD since LUCIA is negative and ratio is normal.\par \par 1/21/19\par He is here for follow-up with his wife.  His Kappa+Lambda have slightly risen, but the ratio is stable, so this is likely due to his CKD.   Most recent CBC is stable.  He received a prescription for Revlimid 5mg so he has been taking them every other day.  He is otherwise feeling well.  The rest of the CRAB criteria remains the same.  \par \par 2/25/19\par He is here with his wife.  As both his light chains are increasing, it is likely it is from his renal failure.  His most recent Hgb is 10.8g/dL, so we may stop when he nears 11.0g/dL.  He will see Dr. Rod on 3/25/19.   \par \par 3/20/19\par He is here for follow-up of MM with his wife.  His Hgb is stable at 11.3g/dL.  His BP is slightly elevated today. He will speak with his PCP.  He is otherwise feeling well.\par \par 4/22/19\par As of February, the immunofixation is picking up weak IgG Kappa bands no CRAB criteria  or significant rise in paraprotein that suggests progression .  We will continue with Revlimid at this time.   They went to see Dr. Rod from [facility] last month.   He feels well otherwise.\par \par 5/20/19\par He is here for follow up. He is doing well. His HgB is coming down so we will restart Procrit today. MM panel from March stable.\par \par 6/17/19\par He is here for follow-up of MM with his wife.  The MM panel from May was stable.  His hgb is 11.8g/dL today, so he does not need procrit.  He is feeling well. \par \par 7/15/19\par He is here for follow up. His  Myeloma panel from June is stable, Serum LUCIA remains negative.\par \par 8/12/19\par He is here for follow up. paraproteins from July are stable with negative serum LUCIA. His HgB is 10.9. he has no complaints.

## 2019-08-12 NOTE — RESULTS/DATA
[FreeTextEntry1] :  Result Name Results Units Reference Range \par      PET CT WHOLE BODY TOP OF SKULL TO TI   SEE TEXT       \par     Patient Name: MOR, AUGUST : 1950\par Patient ID: [id number]\par Account: [id number]\par Patient Location: [facility]\par Accession: [id number]\par Procedure: PET CT WHOLE BODY TOP OF SKULL TO TIP OF TOES SUBSEQUENT 250-0036\par Date of Exam: 2017 11:09 AM\par Attending Physician: JARVIS LOW\par Requesting Physician: JARVIS LOW MD\par Clinical History / Reason for exam: FDG PET CT STUDY:\par Reason for examination: Tumor imaging - PET with concurrently acquired CT for\par attenuation correction and anatomic localization;  top of the skull  to toes/\par 16380 multiple myeloma, subsequent treatment strategy.\par ICD-10 code:C90.0\par History: 66-year-old male patient with history of multiple myeloma, last\par chemotherapy received was in 2017. Patient had bone biopsy on\par 2017.\par Blood glucose pre injection 102 mg/dL\par Technique:\par Approximately 45 minutes after the intravenous administration of 13.4  mCi\par 18-Fluorine FDG, whole body PET images were acquired from top of  the skull to\par toes. In addition, non-iv and non oral contrast, low dose, non - diagnostic CT\par was acquired for attenuation correction and anatomic correlation only.\par Findings:\par Comparison: Prior PET CT study done on July 3, 2015. Correlation was performed\par with the skeletal survey done on 2016.\par Head /Neck: Physiologic FDG uptake is seen in the visualized region of the\par brain, pharyngeal lymphoid tissue, and salivary glands.\par Chest:\par Physiologic FDG avid PET is seen in the mediastinal blood pool and myocardium.\par Chest wall: Unremarkable\par Lungs: No abnormal uptake.\par Mediastinum/Pleura/pericardium: No abnormal uptake.\par Thoracic/ axillary lymph nodes: No abnormal uptake.\par Hepatobiliary: Unremarkable.\par Gallbladder: Multiple gallstones.\par Spleen: No abnormal uptake\par Pancreas: No abnormal uptake.\par Adrenal glands: No abnormal uptake.\par Kidneys/ureters/bladder: Normal excretory activity is demonstrated.\par Abdominal pelvic lymph nodes: No abnormal uptake.\par Bowel/peritoneum/mesentery: No abnormal uptake.\par Pelvic organs: Unremarkable. No abnormal increased uptake. Prosthetic\par calcifications.\par Bones/soft tissues: Osteoarthritic changes are identified in the bones. \par Patient\par has multiple extensive lytic lesions in the skeletal, seen in the skeletal\par survey  for details see the report  on 2016.\par In the prior examination there was PET positive lytic lesion right fifth rib\par with a max SUV 2.6, now non-FDG avid   and L5 vertebral body on the left side\par with a max SUV 3.8, now non-FDG avid with multiple other scattered lytic\par lesions, non-FDG avid.\par No abnormal increased uptake is seen in the lower extremities.\par Impression:\par 1. No new areas of abnormal increased uptake is seen.\par 2. Previously seen abnormal increased uptake seen in the right fifth rib and \par L5\par vertebral body on the left side, at this time both regions are non-FDG avid.\par 3. Multiple extensive lytic lesions in the skeletal consistent with multiple\par myeloma.\par 4. No other areas of abnormal increased uptake is seen.\par I the attending attest that I have personally reviewed these images and agree\par with this report.\par \par  \par

## 2019-08-12 NOTE — CONSULT LETTER
[Courtesy Letter:] : I had the pleasure of seeing your patient, [unfilled], in my office today. [Dear  ___] : Dear  [unfilled], [Sincerely,] : Sincerely, [Referral Closing:] : Thank you very much for seeing this patient.  If you have any questions, please do not hesitate to contact me. [DrOrquidea  ___] : Dr. RECIO [FreeTextEntry3] : Andrew Pantoja

## 2019-08-12 NOTE — REVIEW OF SYSTEMS
[Negative] : Allergic/Immunologic [Fatigue] : no fatigue [Dry Eyes] : no dryness of the eyes [Dysphagia] : no dysphagia [Odynophagia] : no odynophagia [Mucosal Pain] : no mucosal pain [Leg Claudication] : no intermittent leg claudication [SOB on Exertion] : no shortness of breath during exertion [Lower Ext Edema] : no lower extremity edema [Muscle Pain] : no muscle pain

## 2019-08-16 LAB
ALBUMIN MFR SERPL ELPH: 59.3 %
ALBUMIN SERPL-MCNC: 3.9 G/DL
ALBUMIN/GLOB SERPL: 1.5 RATIO
ALPHA1 GLOB MFR SERPL ELPH: 5.7 %
ALPHA1 GLOB SERPL ELPH-MCNC: 0.4 G/DL
ALPHA2 GLOB MFR SERPL ELPH: 11.6 %
ALPHA2 GLOB SERPL ELPH-MCNC: 0.8 G/DL
B-GLOBULIN MFR SERPL ELPH: 10.7 %
B-GLOBULIN SERPL ELPH-MCNC: 0.7 G/DL
DEPRECATED KAPPA LC FREE/LAMBDA SER: 1.72 RATIO
GAMMA GLOB FLD ELPH-MCNC: 0.8 G/DL
GAMMA GLOB MFR SERPL ELPH: 12.7 %
IGA SER QL IEP: 199 MG/DL
IGG SER QL IEP: 939 MG/DL
IGM SER QL IEP: 18 MG/DL
INTERPRETATION SERPL IEP-IMP: NORMAL
KAPPA LC CSF-MCNC: 3.87 MG/DL
KAPPA LC SERPL-MCNC: 6.65 MG/DL
M PROTEIN SPEC IFE-MCNC: NORMAL
PROT SERPL-MCNC: 6.5 G/DL
PROT SERPL-MCNC: 6.6 G/DL

## 2019-08-26 ENCOUNTER — RX RENEWAL (OUTPATIENT)
Age: 69
End: 2019-08-26

## 2019-09-16 ENCOUNTER — APPOINTMENT (OUTPATIENT)
Dept: HEMATOLOGY ONCOLOGY | Facility: CLINIC | Age: 69
End: 2019-09-16
Payer: MEDICARE

## 2019-09-16 ENCOUNTER — LABORATORY RESULT (OUTPATIENT)
Age: 69
End: 2019-09-16

## 2019-09-16 VITALS
SYSTOLIC BLOOD PRESSURE: 150 MMHG | RESPIRATION RATE: 16 BRPM | BODY MASS INDEX: 25.39 KG/M2 | WEIGHT: 158 LBS | DIASTOLIC BLOOD PRESSURE: 81 MMHG | TEMPERATURE: 97.4 F | HEIGHT: 66 IN | HEART RATE: 74 BPM

## 2019-09-16 DIAGNOSIS — Z00.00 ENCOUNTER FOR GENERAL ADULT MEDICAL EXAMINATION W/OUT ABNORMAL FINDINGS: ICD-10-CM

## 2019-09-16 LAB
ALBUMIN SERPL ELPH-MCNC: 4.3 G/DL
ALP BLD-CCNC: 65 U/L
ALT SERPL-CCNC: 10 U/L
ANION GAP SERPL CALC-SCNC: 18 MMOL/L
AST SERPL-CCNC: 9 U/L
BILIRUB SERPL-MCNC: 0.2 MG/DL
BUN SERPL-MCNC: 74 MG/DL
CALCIUM SERPL-MCNC: 10.2 MG/DL
CHLORIDE SERPL-SCNC: 103 MMOL/L
CHOLEST SERPL-MCNC: 201 MG/DL
CHOLEST/HDLC SERPL: 6.7 RATIO
CO2 SERPL-SCNC: 20 MMOL/L
CREAT SERPL-MCNC: 6.6 MG/DL
GLUCOSE SERPL-MCNC: 135 MG/DL
HCT VFR BLD CALC: 34 %
HDLC SERPL-MCNC: 30 MG/DL
HGB BLD-MCNC: 10.8 G/DL
IRON SATN MFR SERPL: 48 %
IRON SERPL-MCNC: 102 UG/DL
LDLC SERPL CALC-MCNC: 146 MG/DL
MCHC RBC-ENTMCNC: 24.9 PG
MCHC RBC-ENTMCNC: 31.8 G/DL
MCV RBC AUTO: 78.5 FL
PLATELET # BLD AUTO: 190 K/UL
PMV BLD: 9.6 FL
POTASSIUM SERPL-SCNC: 4.5 MMOL/L
PROT SERPL-MCNC: 7 G/DL
RBC # BLD: 4.33 M/UL
RBC # FLD: 17.2 %
SODIUM SERPL-SCNC: 141 MMOL/L
TIBC SERPL-MCNC: 214 UG/DL
TRIGL SERPL-MCNC: 138 MG/DL
UIBC SERPL-MCNC: 112 UG/DL
WBC # FLD AUTO: 8.09 K/UL

## 2019-09-16 PROCEDURE — 99213 OFFICE O/P EST LOW 20 MIN: CPT

## 2019-09-16 NOTE — RESULTS/DATA
[FreeTextEntry1] :  Result Name Results Units Reference Range \par      PET CT WHOLE BODY TOP OF SKULL TO TI   SEE TEXT       \par     Patient Name: MOR, AUGUST : 1950\par Patient ID: [id number]\par Account: [id number]\par Patient Location: [facility]\par Accession: [id number]\par Procedure: PET CT WHOLE BODY TOP OF SKULL TO TIP OF TOES SUBSEQUENT 250-0036\par Date of Exam: 2017 11:09 AM\par Attending Physician: JARVIS LOW\par Requesting Physician: JARVIS LOW MD\par Clinical History / Reason for exam: FDG PET CT STUDY:\par Reason for examination: Tumor imaging - PET with concurrently acquired CT for\par attenuation correction and anatomic localization;  top of the skull  to toes/\par 64093 multiple myeloma, subsequent treatment strategy.\par ICD-10 code:C90.0\par History: 66-year-old male patient with history of multiple myeloma, last\par chemotherapy received was in 2017. Patient had bone biopsy on\par 2017.\par Blood glucose pre injection 102 mg/dL\par Technique:\par Approximately 45 minutes after the intravenous administration of 13.4  mCi\par 18-Fluorine FDG, whole body PET images were acquired from top of  the skull to\par toes. In addition, non-iv and non oral contrast, low dose, non - diagnostic CT\par was acquired for attenuation correction and anatomic correlation only.\par Findings:\par Comparison: Prior PET CT study done on July 3, 2015. Correlation was performed\par with the skeletal survey done on 2016.\par Head /Neck: Physiologic FDG uptake is seen in the visualized region of the\par brain, pharyngeal lymphoid tissue, and salivary glands.\par Chest:\par Physiologic FDG avid PET is seen in the mediastinal blood pool and myocardium.\par Chest wall: Unremarkable\par Lungs: No abnormal uptake.\par Mediastinum/Pleura/pericardium: No abnormal uptake.\par Thoracic/ axillary lymph nodes: No abnormal uptake.\par Hepatobiliary: Unremarkable.\par Gallbladder: Multiple gallstones.\par Spleen: No abnormal uptake\par Pancreas: No abnormal uptake.\par Adrenal glands: No abnormal uptake.\par Kidneys/ureters/bladder: Normal excretory activity is demonstrated.\par Abdominal pelvic lymph nodes: No abnormal uptake.\par Bowel/peritoneum/mesentery: No abnormal uptake.\par Pelvic organs: Unremarkable. No abnormal increased uptake. Prosthetic\par calcifications.\par Bones/soft tissues: Osteoarthritic changes are identified in the bones. \par Patient\par has multiple extensive lytic lesions in the skeletal, seen in the skeletal\par survey  for details see the report  on 2016.\par In the prior examination there was PET positive lytic lesion right fifth rib\par with a max SUV 2.6, now non-FDG avid   and L5 vertebral body on the left side\par with a max SUV 3.8, now non-FDG avid with multiple other scattered lytic\par lesions, non-FDG avid.\par No abnormal increased uptake is seen in the lower extremities.\par Impression:\par 1. No new areas of abnormal increased uptake is seen.\par 2. Previously seen abnormal increased uptake seen in the right fifth rib and \par L5\par vertebral body on the left side, at this time both regions are non-FDG avid.\par 3. Multiple extensive lytic lesions in the skeletal consistent with multiple\par myeloma.\par 4. No other areas of abnormal increased uptake is seen.\par I the attending attest that I have personally reviewed these images and agree\par with this report.\par \par  \par

## 2019-09-16 NOTE — PHYSICAL EXAM
[Fully active, able to carry on all pre-disease performance without restriction] : Status 0 - Fully active, able to carry on all pre-disease performance without restriction [Normal] : affect appropriate [de-identified] : right side of face has healing lesion from cat, encouraged to go to derm

## 2019-09-16 NOTE — REVIEW OF SYSTEMS
[Negative] : Allergic/Immunologic [Fatigue] : no fatigue [Dysphagia] : no dysphagia [Dry Eyes] : no dryness of the eyes [Odynophagia] : no odynophagia [Mucosal Pain] : no mucosal pain [Leg Claudication] : no intermittent leg claudication [Lower Ext Edema] : no lower extremity edema [Muscle Pain] : no muscle pain [SOB on Exertion] : no shortness of breath during exertion

## 2019-09-16 NOTE — HISTORY OF PRESENT ILLNESS
[Therapy: ___] : Therapy: [unfilled] [de-identified] : REASON FOR FOLLOWUP:  Maintenance Velcade for lambda light chain myeloma, status post autologous stem cell transplant.\par \par TREATMENT HISTORY:  On 05/02/2015, he was diagnosed with lambda light chain multiple myeloma when he had a syncopal episode in [place] and was found to be in acute renal failure.  He underwent a bone marrow biopsy that confirmed lambda light chain multiple myeloma.  His beta2 microglobulin was initially 27.1 on diagnosis.  His lambda light chain prior to treatment was as high as 46024.2 on 02/27/2015.  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  There was also diffuse acute tubular injury and modular tubular atrophy with interstitial fibrosis initially on dialysis, but currently been off dialysis for approximately one year.\par \par His treatment summary is as follows.  He initiated Velcade, Cytoxan, and dexamethasone, cycle started on 03/13/2015.  He completed four cycles on 05/08/2015.  He then underwent an autologous bone marrow transplant in 07/2015 at [facility].  He has been on maintenance Velcade but in 6/17 was started on DRD due to  rising lambda with normal kappa.\par \par Mr. Motta is a very pleasant 66yearold gentleman with history of lambda light chain multiple myeloma, treated as above.\par  [FreeTextEntry1] : DRD started  on 6/21/17 went on  Revlimid maintenance 6/18/18 [de-identified] : He presents today for followup she is currently on maintenance Velcade.his blood work from October 27 demonstrated a stage a faint lambda band on urine immunofixation. Nomi a count 7.66 hemoglobin 8.3 platelet count of 132. The SPEP was normal. Creatinine was stable at 5.93 potassium was 5.2 total protein 5.9 unremarkable LFTs free kappa lambda ratio normal 1.27 with free kappa at 42 and free lambda at 33.1. Most recent blood work from 1110 2016 shows a hemoglobin of 7.8. \par \par he was recently seen by Dr. Rod in  [place]. He had blood work done and reports that everything was stable. Dr. Rod also recommended to decrease his Velcade dose to 1.3 mg/m2\par \par He has no complaints. He denies any neuropathy.\par \par 1/4/17\par Presents for follow up today. Doing well on Maintenance Velcade.  No complaints. No neuropathy. \par \par 2/1/17\par He presents for follow up today. He has no complaints. Tolerating Velcade well.\par \par 4/5/17\par Doing well No complaints blood work from last visit was stable.\par \par 5/1/17\par He presents for follow up. He had increasing lambda light chains. Repeat was stable. A bone marrow showed minimal plasma cells and PET CT did not show any new activity.  He has no complaints.\par \par 6/5/2017\par Presents for follow up. He is doing well. He has some fatigue. His  Lambda has been slowly increasing. He has no other complaints.  No neuropathy\par \par 6/12/17\par He was brought back to discuss his lab work. His Lambda has been increasing with stable Kappa as well as slight worsening of his of creatinine He has no complaints.\par \par 7/12/17\par He has been on DRD. His HgB dropped to 5.9 and he is s/p PRBC transfusion.  He finished Revlimid on 7/9. His only complaint is leg crams at night. \par \par 8/9/17\par He is here for follow up. His cramps went away with the lower dose of Revlimid. His His lambda is coming down. He saw Dr. Rod last week as per patient.\par \par 8/30/27\par He is doing well. His legs cramps are better. He has severe anemia but he is asymptomatic. HIs creatinine is better and his light chain is at baseline.\par \par 9/27/17\par He is here for follow up. He needed  blood transfusion and hgb has been stable at 8 since than. He is also status post Venofer x2. He is on Procrit\par  40,000 units every 2 weeks. He feels well otherwise. \par \par 11/20/17\par Patient feels well and has no complaints. States he was taking Revlimid 5mg q48hrs prior to stopping for low counts. Patient will get Procrit and Darzalex. Patient will be restarted on Revilimid 2.5mg. Patient will continue every 2week Darzalex until week 25 then it becomes every 4 weeks. \par \par 12/18/17\par He is here for follow up. He is doing well. Blood work from last month still in CR. Cr and counts are better. He has no complaints and feel great.\par \par 1/22/18\par He is here for follow up. He is recovering from a cold. He is taking a prednisone taper given by his PMD. Had mules pain from cough.\par \par 2/26/18\par He is doing well. He has no complaints. His labs have been stable his light chain only went up slightly but ratio is normal. \par \par 3/27/18\par He is here for follow up he is doing well. He had blood work in [facility]  where he saw Dr. Rod 3/26/18 WBC 6.7, HgB 11.3, Pts 140, LFT WNL, , Ca 9.7. Cr. 5.96, alb 3.2\par \par He feels well. \par \par 4/23/18\par No events feels well. No complaints\par \par 5/21/18\par He is here for follow up. Doing well. Has no complaints\par \par 6/18/18\par His hgb has been dropping on monthly Procrit, His ligh chains are normal last Serum LUCIA from april 23 showed IgG kappa band thus he achieved aVGPR for DRD. He has no complaints.\par \par 7/16/18\par He is here for follow up. Doing well. No complaints. His kitten bit him.\par \par 8/13/18\par He is doing well. No complains. His HGb has been over 11 gm/dl So we are holding his procrit.\par \par 9/10/18\par He is here for follow up. He Myleoma panel is stable. Hgb is stable. He is due for his vaccinations 2 years post Auto and will have them today. He started Revlimid about 1 week ago. He has no complaints.\par \par 10/10/18\par He is here for follow up. He is doing well. BP is a bit high will watch. He has no complaints. He has his revlamid.\par \par 11/7/18\par He is here for follow up. His last hgb was under 10 gm/dl.  He is doing well otherwise. Was started on something for his blood pressure by Dr. Allen but he does not know what it is. He has his Revlimid\par \par 12/10/18\par Patient is here for a follow-up visit.  He is feeling well with no complaints.  Most recent CBC is stable.  Patient denies fever, chills, nausea, vomiting.  The light chains are picking up but it may be related to the CKD since LUCIA is negative and ratio is normal.\par \par 1/21/19\par He is here for follow-up with his wife.  His Kappa+Lambda have slightly risen, but the ratio is stable, so this is likely due to his CKD.   Most recent CBC is stable.  He received a prescription for Revlimid 5mg so he has been taking them every other day.  He is otherwise feeling well.  The rest of the CRAB criteria remains the same.  \par \par 2/25/19\par He is here with his wife.  As both his light chains are increasing, it is likely it is from his renal failure.  His most recent Hgb is 10.8g/dL, so we may stop when he nears 11.0g/dL.  He will see Dr. Rod on 3/25/19.   \par \par 3/20/19\par He is here for follow-up of MM with his wife.  His Hgb is stable at 11.3g/dL.  His BP is slightly elevated today. He will speak with his PCP.  He is otherwise feeling well.\par \par 4/22/19\par As of February, the immunofixation is picking up weak IgG Kappa bands no CRAB criteria  or significant rise in paraprotein that suggests progression .  We will continue with Revlimid at this time.   They went to see Dr. Rdo from [facility] last month.   He feels well otherwise.\par \par 5/20/19\par He is here for follow up. He is doing well. His HgB is coming down so we will restart Procrit today. MM panel from March stable.\par \par 6/17/19\par He is here for follow-up of MM with his wife.  The MM panel from May was stable.  His hgb is 11.8g/dL today, so he does not need procrit.  He is feeling well. \par \par 7/15/19\par He is here for follow up. His  Myeloma panel from June is stable, Serum LUCIA remains negative.\par \par 8/12/19\par He is here for follow up. paraproteins from July are stable with negative serum LUCIA. His HgB is 10.9. he has no complaints. \par \par 9/16/19\par He is here for follow up. On day 4 of Revlimid. No complaints. HGB is stable. Serologically still in CR.

## 2019-09-16 NOTE — ASSESSMENT
[FreeTextEntry1] : #Free light chain multiple myeloma, specifically lambda.  Initial beta-2 was 27.1.  His bone marrow cytogenetics was normal.  He is status post four cycles of VCD.  This is followed by autologous bone marrow transplant in 07/2015,he has been on  weekly Velcade maintenance, He is also being followed by Dr. Krysta Rod in [place].  He currently takes his acyclovir.  Last myeloma panel  showed increasing lambda light chains but marrow showed minimal plasma cells with PET CT no progression.  HIs lambda continues to increase supporting biochemical recurrence with worsening creatinine  He was started on Daratumumab, Revlimid 5 mg daily d1-21 and  Dexamethasone  40 mg weekly PO with great response and is nos most likley in CR. Patient was taking Revlimid every other day and it was held for pancytopenia.  On 6/18/18 went on Revlimid maintenance . Achieved a VGPR/CR. Since 2/2019 Serum LUCIA is now positive thus Relapse from complete response,  but  no evidence of clinical relapse thus would keep going with Revlamid. If   affected light chain begins to rise constantly  will check PET/CT possible repeat marrow and will consider change in therapy, multiple options such as restating Esthela and going to pomalidomide with Dex or Pom Dex and Slam7 inhibition or Esthela, carfilzomib and dex or  clinical trial if available  \par - c/w Revlimid 2.5 mg every other day d1-21 every 28 days (takes 10 tabs) for now\par -Myeloma panel sent\par -will get  Flue show with PMD, refuses colonoscopy \par \par #.Chronic kidney disease from multiple myeloma.  He has  been on dialysis in the past but is now off.  \par - creatinine has been relatively stable.  He follows up with Dr. Allen.\par \par #.Normocytic anemia, related to chronic kidney disease and Revlimid was on    Procrit  60,000 units every  other  week and s/p IV iron . \par - will keep saturation over 20% and Ferritin over 100\par -hgB stable will watch him off Procrit \par \par 4. Moderate to severe   thrombocytopenia most likely from Revlimid. \par - Resolved\par \par 5. HTN \par - on meds, will follow-up with his PCP for adjustment, stable today but systolic is s bit high\par \par \par RTC in 1 month

## 2019-09-17 ENCOUNTER — LABORATORY RESULT (OUTPATIENT)
Age: 69
End: 2019-09-17

## 2019-09-17 LAB
ALBUMIN MFR SERPL ELPH: 60.6 %
ALBUMIN SERPL-MCNC: 4 G/DL
ALBUMIN/GLOB SERPL: 1.5 RATIO
ALPHA1 GLOB MFR SERPL ELPH: 5.5 %
ALPHA1 GLOB SERPL ELPH-MCNC: 0.4 G/DL
ALPHA2 GLOB MFR SERPL ELPH: 11.4 %
ALPHA2 GLOB SERPL ELPH-MCNC: 0.8 G/DL
B-GLOBULIN MFR SERPL ELPH: 10.4 %
B-GLOBULIN SERPL ELPH-MCNC: 0.7 G/DL
DEPRECATED KAPPA LC FREE/LAMBDA SER: 1.94 RATIO
GAMMA GLOB FLD ELPH-MCNC: 0.8 G/DL
GAMMA GLOB MFR SERPL ELPH: 12.1 %
IGA SER QL IEP: 209 MG/DL
IGG SER QL IEP: 933 MG/DL
IGM SER QL IEP: 21 MG/DL
INTERPRETATION SERPL IEP-IMP: NORMAL
KAPPA LC CSF-MCNC: 4.73 MG/DL
KAPPA LC SERPL-MCNC: 9.19 MG/DL
M PROTEIN SPEC IFE-MCNC: NORMAL
PROT SERPL-MCNC: 6.6 G/DL
PROT SERPL-MCNC: 6.6 G/DL

## 2019-09-23 ENCOUNTER — RX RENEWAL (OUTPATIENT)
Age: 69
End: 2019-09-23

## 2019-10-08 ENCOUNTER — RX RENEWAL (OUTPATIENT)
Age: 69
End: 2019-10-08

## 2019-10-21 ENCOUNTER — APPOINTMENT (OUTPATIENT)
Dept: HEMATOLOGY ONCOLOGY | Facility: CLINIC | Age: 69
End: 2019-10-21
Payer: MEDICARE

## 2019-10-21 ENCOUNTER — LABORATORY RESULT (OUTPATIENT)
Age: 69
End: 2019-10-21

## 2019-10-21 VITALS
RESPIRATION RATE: 16 BRPM | SYSTOLIC BLOOD PRESSURE: 138 MMHG | TEMPERATURE: 96.8 F | WEIGHT: 157 LBS | HEART RATE: 75 BPM | DIASTOLIC BLOOD PRESSURE: 83 MMHG | BODY MASS INDEX: 25.23 KG/M2 | HEIGHT: 66 IN

## 2019-10-21 PROCEDURE — 99213 OFFICE O/P EST LOW 20 MIN: CPT

## 2019-10-21 NOTE — REVIEW OF SYSTEMS
[Negative] : Allergic/Immunologic [Fatigue] : no fatigue [Dry Eyes] : no dryness of the eyes [Mucosal Pain] : no mucosal pain [SOB on Exertion] : no shortness of breath during exertion [Leg Claudication] : no intermittent leg claudication [Lower Ext Edema] : no lower extremity edema [Muscle Pain] : no muscle pain

## 2019-10-21 NOTE — RESULTS/DATA
[FreeTextEntry1] :  Result Name Results Units Reference Range \par      PET CT WHOLE BODY TOP OF SKULL TO TI   SEE TEXT       \par     Patient Name: MOR, AUGUST : 1950\par Patient ID: [id number]\par Account: [id number]\par Patient Location: [facility]\par Accession: [id number]\par Procedure: PET CT WHOLE BODY TOP OF SKULL TO TIP OF TOES SUBSEQUENT 250-0036\par Date of Exam: 2017 11:09 AM\par Attending Physician: JARVIS LOW\par Requesting Physician: JARVIS LOW MD\par Clinical History / Reason for exam: FDG PET CT STUDY:\par Reason for examination: Tumor imaging - PET with concurrently acquired CT for\par attenuation correction and anatomic localization;  top of the skull  to toes/\par [address]77[address] multiple myeloma, subsequent treatment strategy.\par ICD-10 code:C90.0\par History: 66-year-old male patient with history of multiple myeloma, last\par chemotherapy received was in 2017. Patient had bone biopsy on\par 2017.\par Blood glucose pre injection 102 mg/dL\par Technique:\par Approximately 45 minutes after the intravenous administration of 13.4  mCi\par 18-Fluorine FDG, whole body PET images were acquired from top of  the skull to\par toes. In addition, non-iv and non oral contrast, low dose, non - diagnostic CT\par was acquired for attenuation correction and anatomic correlation only.\par Findings:\par Comparison: Prior PET CT study done on July 3, 2015. Correlation was performed\par with the skeletal survey done on 2016.\par Head /Neck: Physiologic FDG uptake is seen in the visualized region of the\par brain, pharyngeal lymphoid tissue, and salivary glands.\par Chest:\par Physiologic FDG avid PET is seen in the mediastinal blood pool and myocardium.\par Chest wall: Unremarkable\par Lungs: No abnormal uptake.\par Mediastinum/Pleura/pericardium: No abnormal uptake.\par Thoracic/ axillary lymph nodes: No abnormal uptake.\par Hepatobiliary: Unremarkable.\par Gallbladder: Multiple gallstones.\par Spleen: No abnormal uptake\par Pancreas: No abnormal uptake.\par Adrenal glands: No abnormal uptake.\par Kidneys/ureters/bladder: Normal excretory activity is demonstrated.\par Abdominal pelvic lymph nodes: No abnormal uptake.\par Bowel/peritoneum/mesentery: No abnormal uptake.\par Pelvic organs: Unremarkable. No abnormal increased uptake. Prosthetic\par calcifications.\par Bones/soft tissues: Osteoarthritic changes are identified in the bones. \par Patient\par has multiple extensive lytic lesions in the skeletal, seen in the skeletal\par survey  for details see the report  on 2016.\par In the prior examination there was PET positive lytic lesion right fifth rib\par with a max SUV 2.6, now non-FDG avid   and L5 vertebral body on the left side\par with a max SUV 3.8, now non-FDG avid with multiple other scattered lytic\par lesions, non-FDG avid.\par No abnormal increased uptake is seen in the lower extremities.\par Impression:\par 1. No new areas of abnormal increased uptake is seen.\par 2. Previously seen abnormal increased uptake seen in the right fifth rib and \par L5\par vertebral body on the left side, at this time both regions are non-FDG avid.\par 3. Multiple extensive lytic lesions in the skeletal consistent with multiple\par myeloma.\par 4. No other areas of abnormal increased uptake is seen.\par I the attending attest that I have personally reviewed these images and agree\par with this report.\par \par  \par

## 2019-10-21 NOTE — HISTORY OF PRESENT ILLNESS
[Therapy: ___] : Therapy: [unfilled] [de-identified] : REASON FOR FOLLOWUP:  Maintenance Velcade for lambda light chain myeloma, status post autologous stem cell transplant.\par \par TREATMENT HISTORY:  On 05/02/2015, he was diagnosed with lambda light chain multiple myeloma when he had a syncopal episode in [place] and was found to be in acute renal failure.  He underwent a bone marrow biopsy that confirmed lambda light chain multiple myeloma.  His beta2 microglobulin was initially 27.1 on diagnosis.  His lambda light chain prior to treatment was as high as 34586.2 on 02/27/2015.  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  There was also diffuse acute tubular injury and modular tubular atrophy with interstitial fibrosis initially on dialysis, but currently been off dialysis for approximately one year.\par \par His treatment summary is as follows.  He initiated Velcade, Cytoxan, and dexamethasone, cycle started on 03/13/2015.  He completed four cycles on 05/08/2015.  He then underwent an autologous bone marrow transplant in 07/2015 at [facility].  He has been on maintenance Velcade but in 6/17 was started on DRD due to  rising lambda with normal kappa.\par \par Mr. Motta is a very pleasant 66yearold gentleman with history of lambda light chain multiple myeloma, treated as above.\par  [FreeTextEntry1] : DRD started  on 6/21/17 went on  Revlimid maintenance 6/18/18 [de-identified] : He presents today for followup she is currently on maintenance Velcade.his blood work from October 27 demonstrated a stage a faint lambda band on urine immunofixation. Nomi a count 7.66 hemoglobin 8.3 platelet count of 132. The SPEP was normal. Creatinine was stable at 5.93 potassium was 5.2 total protein 5.9 unremarkable LFTs free kappa lambda ratio normal 1.27 with free kappa at 42 and free lambda at 33.1. Most recent blood work from 1110 2016 shows a hemoglobin of 7.8. \par \par he was recently seen by Dr. Rod in  [place]. He had blood work done and reports that everything was stable. Dr. Rod also recommended to decrease his Velcade dose to 1.3 mg/m2\par \par He has no complaints. He denies any neuropathy.\par \par 1/4/17\par Presents for follow up today. Doing well on Maintenance Velcade.  No complaints. No neuropathy. \par \par 2/1/17\par He presents for follow up today. He has no complaints. Tolerating Velcade well.\par \par 4/5/17\par Doing well No complaints blood work from last visit was stable.\par \par 5/1/17\par He presents for follow up. He had increasing lambda light chains. Repeat was stable. A bone marrow showed minimal plasma cells and PET CT did not show any new activity.  He has no complaints.\par \par 6/5/2017\par Presents for follow up. He is doing well. He has some fatigue. His  Lambda has been slowly increasing. He has no other complaints.  No neuropathy\par \par 6/12/17\par He was brought back to discuss his lab work. His Lambda has been increasing with stable Kappa as well as slight worsening of his of creatinine He has no complaints.\par \par 7/12/17\par He has been on DRD. His HgB dropped to 5.9 and he is s/p PRBC transfusion.  He finished Revlimid on 7/9. His only complaint is leg crams at night. \par \par 8/9/17\par He is here for follow up. His cramps went away with the lower dose of Revlimid. His His lambda is coming down. He saw Dr. Rod last week as per patient.\par \par 8/30/27\par He is doing well. His legs cramps are better. He has severe anemia but he is asymptomatic. HIs creatinine is better and his light chain is at baseline.\par \par 9/27/17\par He is here for follow up. He needed  blood transfusion and hgb has been stable at 8 since than. He is also status post Venofer x2. He is on Procrit\par  40,000 units every 2 weeks. He feels well otherwise. \par \par 11/20/17\par Patient feels well and has no complaints. States he was taking Revlimid 5mg q48hrs prior to stopping for low counts. Patient will get Procrit and Darzalex. Patient will be restarted on Revilimid 2.5mg. Patient will continue every 2week Darzalex until week 25 then it becomes every 4 weeks. \par \par 12/18/17\par He is here for follow up. He is doing well. Blood work from last month still in CR. Cr and counts are better. He has no complaints and feel great.\par \par 1/22/18\par He is here for follow up. He is recovering from a cold. He is taking a prednisone taper given by his PMD. Had mules pain from cough.\par \par 2/26/18\par He is doing well. He has no complaints. His labs have been stable his light chain only went up slightly but ratio is normal. \par \par 3/27/18\par He is here for follow up he is doing well. He had blood work in [facility]  where he saw Dr. Rod 3/26/18 WBC 6.7, HgB 11.3, Pts 140, LFT WNL, , Ca 9.7. Cr. 5.96, alb 3.2\par \par He feels well. \par \par 4/23/18\par No events feels well. No complaints\par \par 5/21/18\par He is here for follow up. Doing well. Has no complaints\par \par 6/18/18\par His hgb has been dropping on monthly Procrit, His ligh chains are normal last Serum LUCIA from april 23 showed IgG kappa band thus he achieved aVGPR for DRD. He has no complaints.\par \par 7/16/18\par He is here for follow up. Doing well. No complaints. His kitten bit him.\par \par 8/13/18\par He is doing well. No complains. His HGb has been over 11 gm/dl So we are holding his procrit.\par \par 9/10/18\par He is here for follow up. He Myleoma panel is stable. Hgb is stable. He is due for his vaccinations 2 years post Auto and will have them today. He started Revlimid about 1 week ago. He has no complaints.\par \par 10/10/18\par He is here for follow up. He is doing well. BP is a bit high will watch. He has no complaints. He has his revlamid.\par \par 11/7/18\par He is here for follow up. His last hgb was under 10 gm/dl.  He is doing well otherwise. Was started on something for his blood pressure by Dr. Allen but he does not know what it is. He has his Revlimid\par \par 12/10/18\par Patient is here for a follow-up visit.  He is feeling well with no complaints.  Most recent CBC is stable.  Patient denies fever, chills, nausea, vomiting.  The light chains are picking up but it may be related to the CKD since LUCIA is negative and ratio is normal.\par \par 1/21/19\par He is here for follow-up with his wife.  His Kappa+Lambda have slightly risen, but the ratio is stable, so this is likely due to his CKD.   Most recent CBC is stable.  He received a prescription for Revlimid 5mg so he has been taking them every other day.  He is otherwise feeling well.  The rest of the CRAB criteria remains the same.  \par \par 2/25/19\par He is here with his wife.  As both his light chains are increasing, it is likely it is from his renal failure.  His most recent Hgb is 10.8g/dL, so we may stop when he nears 11.0g/dL.  He will see Dr. Rod on 3/25/19.   \par \par 3/20/19\par He is here for follow-up of MM with his wife.  His Hgb is stable at 11.3g/dL.  His BP is slightly elevated today. He will speak with his PCP.  He is otherwise feeling well.\par \par 4/22/19\par As of February, the immunofixation is picking up weak IgG Kappa bands no CRAB criteria  or significant rise in paraprotein that suggests progression .  We will continue with Revlimid at this time.   They went to see Dr. Rod from [facility] last month.   He feels well otherwise.\par \par 5/20/19\par He is here for follow up. He is doing well. His HgB is coming down so we will restart Procrit today. MM panel from March stable.\par \par 6/17/19\par He is here for follow-up of MM with his wife.  The MM panel from May was stable.  His hgb is 11.8g/dL today, so he does not need procrit.  He is feeling well. \par \par 7/15/19\par He is here for follow up. His  Myeloma panel from June is stable, Serum LUCIA remains negative.\par \par 8/12/19\par He is here for follow up. paraproteins from July are stable with negative serum LUCIA. His HgB is 10.9. he has no complaints. \par \par 9/16/19\par He is here for follow up. On day 4 of Revlimid. No complaints. HGB is stable. Serologically still in CR.\par \par 10/21/19\par he is here for follow up. No deviance of progression on last labs. he feels well.  On his off week of Revlimid. No complaints. to get his flu shot and pneumococcal vaccine with PMD next week.\par

## 2019-10-21 NOTE — CONSULT LETTER
[Dear  ___] : Dear  [unfilled], [Courtesy Letter:] : I had the pleasure of seeing your patient, [unfilled], in my office today. [Sincerely,] : Sincerely, [Referral Closing:] : Thank you very much for seeing this patient.  If you have any questions, please do not hesitate to contact me. [DrOrquidea  ___] : Dr. RECIO [FreeTextEntry3] : Andrew Pantoja

## 2019-10-21 NOTE — PHYSICAL EXAM
[Fully active, able to carry on all pre-disease performance without restriction] : Status 0 - Fully active, able to carry on all pre-disease performance without restriction [Normal] : affect appropriate [de-identified] : right side of face has healing lesion from cat, encouraged to go to derm

## 2019-10-21 NOTE — ASSESSMENT
[FreeTextEntry1] : #Free light chain multiple myeloma, specifically lambda.  Initial beta-2 was 27.1.  His bone marrow cytogenetics was normal.  He is status post four cycles of VCD.  This is followed by autologous bone marrow transplant in 07/2015,he has been on  weekly Velcade maintenance, He is also being followed by Dr. Krysta Rod in [place].  He currently takes his acyclovir.  Last myeloma panel  showed increasing lambda light chains but marrow showed minimal plasma cells with PET CT no progression.  HIs lambda continues to increase supporting biochemical recurrence with worsening creatinine  He was started on Daratumumab, Revlimid 5 mg daily d1-21 and  Dexamethasone  40 mg weekly PO with great response and is nos most likley in CR. Patient was taking Revlimid every other day and it was held for pancytopenia.  On 6/18/18 went on Revlimid maintenance . Achieved a VGPR/CR. Since 2/2019 Serum LUCIA is now positive thus Relapse from complete response,  but  no evidence of clinical relapse thus would keep going with Revlamid. If   affected light chain begins to rise constantly  will check PET/CT possible repeat marrow and will consider change in therapy, multiple options such as restating Esthela and going to pomalidomide with Dex or Pom Dex and Slam7 inhibition or Esthela, carfilzomib and dex or  clinical trial if available  \par - c/w Revlimid 2.5 mg every other day d1-21 every 28 days (takes 10 tabs) for now\par -Myeloma panel sent\par - refuses colonoscopy \par \par #.Chronic kidney disease from multiple myeloma.  He has  been on dialysis in the past but is now off.  \par - creatinine has been relatively stable.  He follows up with Dr. Allen.\par \par #.Normocytic anemia, related to chronic kidney disease and Revlimid was on   Procrit  60,000 units every  other  week and s/p IV iron . \par - will keep saturation over 20% and Ferritin over 100 if goes back on Procrit \par - CBC from today was pending\par \par 4. Moderate to severe   thrombocytopenia most likely from Revlimid. \par - Resolved\par \par 5. HTN \par - on meds, will follow-up with his PCP for adjustment, stable today but systolic is s bit high\par \par \par RTC in 1 month, will call with blood work if needed

## 2019-10-25 LAB
ALBUMIN MFR SERPL ELPH: 59 %
ALBUMIN SERPL ELPH-MCNC: 4.1 G/DL
ALBUMIN SERPL-MCNC: 4 G/DL
ALBUMIN/GLOB SERPL: 1.5 RATIO
ALP BLD-CCNC: 65 U/L
ALPHA1 GLOB MFR SERPL ELPH: 5.6 %
ALPHA1 GLOB SERPL ELPH-MCNC: 0.4 G/DL
ALPHA2 GLOB MFR SERPL ELPH: 13.2 %
ALPHA2 GLOB SERPL ELPH-MCNC: 0.9 G/DL
ALT SERPL-CCNC: 8 U/L
ANION GAP SERPL CALC-SCNC: 20 MMOL/L
AST SERPL-CCNC: 14 U/L
B-GLOBULIN MFR SERPL ELPH: 10.2 %
B-GLOBULIN SERPL ELPH-MCNC: 0.7 G/DL
BILIRUB SERPL-MCNC: 0.2 MG/DL
BUN SERPL-MCNC: 61 MG/DL
CALCIUM SERPL-MCNC: 9.9 MG/DL
CHLORIDE SERPL-SCNC: 101 MMOL/L
CO2 SERPL-SCNC: 15 MMOL/L
CREAT SERPL-MCNC: 6.8 MG/DL
DEPRECATED KAPPA LC FREE/LAMBDA SER: 1.8 RATIO
GAMMA GLOB FLD ELPH-MCNC: 0.8 G/DL
GAMMA GLOB MFR SERPL ELPH: 12 %
GLUCOSE SERPL-MCNC: 99 MG/DL
HCT VFR BLD CALC: 31.2 %
HGB BLD-MCNC: 9.9 G/DL
IGA SER QL IEP: 205 MG/DL
IGG SER QL IEP: 917 MG/DL
IGM SER QL IEP: 21 MG/DL
INTERPRETATION SERPL IEP-IMP: NORMAL
KAPPA LC CSF-MCNC: 5.33 MG/DL
KAPPA LC SERPL-MCNC: 9.61 MG/DL
M PROTEIN SPEC IFE-MCNC: NORMAL
MCHC RBC-ENTMCNC: 24.6 PG
MCHC RBC-ENTMCNC: 31.7 G/DL
MCV RBC AUTO: 77.4 FL
PLATELET # BLD AUTO: 168 K/UL
PMV BLD: 10.4 FL
POTASSIUM SERPL-SCNC: 4.4 MMOL/L
PROT SERPL-MCNC: 6.7 G/DL
PROT SERPL-MCNC: 6.7 G/DL
RBC # BLD: 4.03 M/UL
RBC # FLD: 18 %
SODIUM SERPL-SCNC: 136 MMOL/L
WBC # FLD AUTO: 6.68 K/UL

## 2019-10-28 ENCOUNTER — APPOINTMENT (OUTPATIENT)
Dept: INFUSION THERAPY | Facility: CLINIC | Age: 69
End: 2019-10-28

## 2019-10-31 ENCOUNTER — OUTPATIENT (OUTPATIENT)
Dept: OUTPATIENT SERVICES | Facility: HOSPITAL | Age: 69
LOS: 1 days | Discharge: HOME | End: 2019-10-31

## 2019-10-31 ENCOUNTER — LABORATORY RESULT (OUTPATIENT)
Age: 69
End: 2019-10-31

## 2019-10-31 ENCOUNTER — APPOINTMENT (OUTPATIENT)
Dept: INFUSION THERAPY | Facility: CLINIC | Age: 69
End: 2019-10-31

## 2019-10-31 DIAGNOSIS — C90.00 MULTIPLE MYELOMA NOT HAVING ACHIEVED REMISSION: ICD-10-CM

## 2019-10-31 DIAGNOSIS — N18.9 CHRONIC KIDNEY DISEASE, UNSPECIFIED: ICD-10-CM

## 2019-10-31 LAB
HCT VFR BLD CALC: 32.2 %
HGB BLD-MCNC: 10.1 G/DL
MCHC RBC-ENTMCNC: 24.8 PG
MCHC RBC-ENTMCNC: 31.4 G/DL
MCV RBC AUTO: 78.9 FL
PLATELET # BLD AUTO: 199 K/UL
PMV BLD: 10.3 FL
RBC # BLD: 4.08 M/UL
RBC # FLD: 18.6 %
WBC # FLD AUTO: 5.54 K/UL

## 2019-10-31 RX ORDER — ERYTHROPOIETIN 10000 [IU]/ML
60000 INJECTION, SOLUTION INTRAVENOUS; SUBCUTANEOUS ONCE
Refills: 0 | Status: COMPLETED | OUTPATIENT
Start: 2019-10-31 | End: 2019-10-31

## 2019-10-31 RX ADMIN — ERYTHROPOIETIN 60000 UNIT(S): 10000 INJECTION, SOLUTION INTRAVENOUS; SUBCUTANEOUS at 09:06

## 2019-11-14 ENCOUNTER — APPOINTMENT (OUTPATIENT)
Dept: INFUSION THERAPY | Facility: CLINIC | Age: 69
End: 2019-11-14

## 2019-11-14 ENCOUNTER — RX RENEWAL (OUTPATIENT)
Age: 69
End: 2019-11-14

## 2019-11-14 ENCOUNTER — LABORATORY RESULT (OUTPATIENT)
Age: 69
End: 2019-11-14

## 2019-11-14 LAB
HCT VFR BLD CALC: 34.4 %
HGB BLD-MCNC: 10.6 G/DL
MCHC RBC-ENTMCNC: 25.4 PG
MCHC RBC-ENTMCNC: 30.8 G/DL
MCV RBC AUTO: 82.3 FL
PLATELET # BLD AUTO: 171 K/UL
PMV BLD: 10.4 FL
RBC # BLD: 4.18 M/UL
RBC # FLD: 21.4 %
WBC # FLD AUTO: 7.93 K/UL

## 2019-11-14 RX ORDER — ERYTHROPOIETIN 10000 [IU]/ML
60000 INJECTION, SOLUTION INTRAVENOUS; SUBCUTANEOUS ONCE
Refills: 0 | Status: DISCONTINUED | OUTPATIENT
Start: 2019-11-14 | End: 2020-04-25

## 2019-11-18 ENCOUNTER — LABORATORY RESULT (OUTPATIENT)
Age: 69
End: 2019-11-18

## 2019-11-18 ENCOUNTER — APPOINTMENT (OUTPATIENT)
Dept: HEMATOLOGY ONCOLOGY | Facility: CLINIC | Age: 69
End: 2019-11-18
Payer: MEDICARE

## 2019-11-18 ENCOUNTER — RX RENEWAL (OUTPATIENT)
Age: 69
End: 2019-11-18

## 2019-11-18 VITALS
DIASTOLIC BLOOD PRESSURE: 67 MMHG | HEART RATE: 74 BPM | WEIGHT: 155 LBS | TEMPERATURE: 98.3 F | HEIGHT: 66 IN | SYSTOLIC BLOOD PRESSURE: 152 MMHG | BODY MASS INDEX: 24.91 KG/M2 | RESPIRATION RATE: 74 BRPM

## 2019-11-18 LAB
HCT VFR BLD CALC: 35.8 %
HGB BLD-MCNC: 11 G/DL
MCHC RBC-ENTMCNC: 25.1 PG
MCHC RBC-ENTMCNC: 30.7 G/DL
MCV RBC AUTO: 81.5 FL
PLATELET # BLD AUTO: 183 K/UL
PMV BLD: 11.3 FL
RBC # BLD: 4.39 M/UL
RBC # FLD: 20.1 %
WBC # FLD AUTO: 2.91 K/UL

## 2019-11-18 PROCEDURE — 99213 OFFICE O/P EST LOW 20 MIN: CPT

## 2019-11-19 LAB
ALBUMIN MFR SERPL ELPH: 51.5 %
ALBUMIN SERPL ELPH-MCNC: 3.9 G/DL
ALBUMIN SERPL-MCNC: 3.4 G/DL
ALBUMIN/GLOB SERPL: 1.1 RATIO
ALP BLD-CCNC: 59 U/L
ALPHA1 GLOB MFR SERPL ELPH: 9.3 %
ALPHA1 GLOB SERPL ELPH-MCNC: 0.6 G/DL
ALPHA2 GLOB MFR SERPL ELPH: 12.9 %
ALPHA2 GLOB SERPL ELPH-MCNC: 0.9 G/DL
ALT SERPL-CCNC: 6 U/L
ANION GAP SERPL CALC-SCNC: 17 MMOL/L
AST SERPL-CCNC: 9 U/L
B-GLOBULIN MFR SERPL ELPH: 11.1 %
B-GLOBULIN SERPL ELPH-MCNC: 0.7 G/DL
BILIRUB SERPL-MCNC: <0.2 MG/DL
BUN SERPL-MCNC: 58 MG/DL
CALCIUM SERPL-MCNC: 9.5 MG/DL
CHLORIDE SERPL-SCNC: 103 MMOL/L
CO2 SERPL-SCNC: 18 MMOL/L
CREAT SERPL-MCNC: 6.8 MG/DL
DEPRECATED KAPPA LC FREE/LAMBDA SER: 1.38 RATIO
GAMMA GLOB FLD ELPH-MCNC: 1 G/DL
GAMMA GLOB MFR SERPL ELPH: 15.2 %
GLUCOSE SERPL-MCNC: 110 MG/DL
IGA SER QL IEP: 200 MG/DL
IGG SER QL IEP: 1114 MG/DL
IGM SER QL IEP: 70 MG/DL
INTERPRETATION SERPL IEP-IMP: NORMAL
KAPPA LC CSF-MCNC: 8.09 MG/DL
KAPPA LC SERPL-MCNC: 11.14 MG/DL
M PROTEIN MFR SERPL ELPH: NORMAL
M PROTEIN SPEC IFE-MCNC: NORMAL
MONOCLON BAND OBS SERPL: NORMAL
POTASSIUM SERPL-SCNC: 4.3 MMOL/L
PROT SERPL-MCNC: 6.6 G/DL
SODIUM SERPL-SCNC: 138 MMOL/L

## 2019-11-21 NOTE — REVIEW OF SYSTEMS
[Fatigue] : no fatigue [Dry Eyes] : no dryness of the eyes [Mucosal Pain] : no mucosal pain [Leg Claudication] : no intermittent leg claudication [Lower Ext Edema] : no lower extremity edema [SOB on Exertion] : no shortness of breath during exertion [Muscle Pain] : no muscle pain

## 2019-11-21 NOTE — ASSESSMENT
[FreeTextEntry1] : #Free light chain multiple myeloma, specifically lambda.  Initial beta-2 was 27.1.  His bone marrow cytogenetics was normal.  He is status post four cycles of VCD.  This is followed by autologous bone marrow transplant in 07/2015,he has been on  weekly Velcade maintenance, He is also being followed by Dr. Krysta Rod in [place].  He currently takes his acyclovir.  Last myeloma panel  showed increasing lambda light chains but marrow showed minimal plasma cells with PET CT no progression.  HIs lambda continues to increase supporting biochemical recurrence with worsening creatinine  He was started on Daratumumab, Revlimid 5 mg daily d1-21 and  Dexamethasone  40 mg weekly PO with great response and is nos most likley in CR. Patient was taking Revlimid every other day and it was held for pancytopenia.  On 6/18/18 went on Revlimid maintenance . Achieved a VGPR/CR. Since 2/2019 Serum LUCIA is now positive thus Relapse from complete response,  but  no evidence of clinical relapse thus would keep going with Revlamid. If   affected light chain begins to rise constantly  will check PET/CT possible repeat marrow and will consider change in therapy, multiple options such as restating Esthela and going to pomalidomide with Dex or Pom Dex and Slam7 inhibition or Esthela, carfilzomib and dex or  clinical trial if available  \par - c/w Revlimid 2.5 mg every other day d1-21 every 28 days (takes 10 tabs) for now\par - Myeloma panel sent\par - refuses colonoscopy \par - CBC in one month\par \par #.Chronic kidney disease from multiple myeloma.  He has  been on dialysis in the past but is now off.  \par - creatinine has been relatively stable.  He follows up with Dr. Allen.\par \par #.Normocytic anemia, related to chronic kidney disease and Revlimid was on   Procrit  60,000 units every  other  week and s/p IV iron .  On hold again since Hgb >10 gm/DL\par - will keep saturation over 20% and Ferritin over 100 if goes back on Procrit  \par \par 4. Moderate to severe   thrombocytopenia most likely from Revlimid. \par - Resolved\par \par 5. HTN \par - on meds, will follow-up with his PCP for adjustment, stable today but systolic is s bit high\par \par RTC in 1 month, will call with blood work  results if any changes

## 2019-11-21 NOTE — HISTORY OF PRESENT ILLNESS
Pt. Calm, cooperative, and pleasant. Reactive with good eye contact. Pt. Was tearful on the phone with friend. \"I cannot control crying. \"  6/10 depression and 5/10 anxiety. Reports feeling unmotivated and irritable in the morning. Pt. Is friendly and carries a conversation well, but reports social anxiety. Pt. Does report a passive death wish. Poor sleep, but good appetite. [de-identified] : REASON FOR FOLLOWUP:  Maintenance Velcade for lambda light chain myeloma, status post autologous stem cell transplant.\par \par TREATMENT HISTORY:  On 05/02/2015, he was diagnosed with lambda light chain multiple myeloma when he had a syncopal episode in [place] and was found to be in acute renal failure.  He underwent a bone marrow biopsy that confirmed lambda light chain multiple myeloma.  His beta2 microglobulin was initially 27.1 on diagnosis.  His lambda light chain prior to treatment was as high as 31464.2 on 02/27/2015.  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  There was also diffuse acute tubular injury and modular tubular atrophy with interstitial fibrosis initially on dialysis, but currently been off dialysis for approximately one year.\par \par His treatment summary is as follows.  He initiated Velcade, Cytoxan, and dexamethasone, cycle started on 03/13/2015.  He completed four cycles on 05/08/2015.  He then underwent an autologous bone marrow transplant in 07/2015 at [facility].  He has been on maintenance Velcade but in 6/17 was started on DRD due to  rising lambda with normal kappa.\par \par Mr. Motta is a very pleasant 66yearold gentleman with history of lambda light chain multiple myeloma, treated as above.\par  [FreeTextEntry1] : DRD started  on 6/21/17 went on  Revlimid maintenance 6/18/18 [de-identified] : He presents today for followup she is currently on maintenance Velcade.his blood work from October 27 demonstrated a stage a faint lambda band on urine immunofixation. Nomi a count 7.66 hemoglobin 8.3 platelet count of 132. The SPEP was normal. Creatinine was stable at 5.93 potassium was 5.2 total protein 5.9 unremarkable LFTs free kappa lambda ratio normal 1.27 with free kappa at 42 and free lambda at 33.1. Most recent blood work from 1110 2016 shows a hemoglobin of 7.8. \par \par he was recently seen by Dr. Rod in  [place]. He had blood work done and reports that everything was stable. Dr. Rod also recommended to decrease his Velcade dose to 1.3 mg/m2\par \par He has no complaints. He denies any neuropathy.\par \par 1/4/17\par Presents for follow up today. Doing well on Maintenance Velcade.  No complaints. No neuropathy. \par \par 2/1/17\par He presents for follow up today. He has no complaints. Tolerating Velcade well.\par \par 4/5/17\par Doing well No complaints blood work from last visit was stable.\par \par 5/1/17\par He presents for follow up. He had increasing lambda light chains. Repeat was stable. A bone marrow showed minimal plasma cells and PET CT did not show any new activity.  He has no complaints.\par \par 6/5/2017\par Presents for follow up. He is doing well. He has some fatigue. His  Lambda has been slowly increasing. He has no other complaints.  No neuropathy\par \par 6/12/17\par He was brought back to discuss his lab work. His Lambda has been increasing with stable Kappa as well as slight worsening of his of creatinine He has no complaints.\par \par 7/12/17\par He has been on DRD. His HgB dropped to 5.9 and he is s/p PRBC transfusion.  He finished Revlimid on 7/9. His only complaint is leg crams at night. \par \par 8/9/17\par He is here for follow up. His cramps went away with the lower dose of Revlimid. His His lambda is coming down. He saw Dr. Rod last week as per patient.\par \par 8/30/27\par He is doing well. His legs cramps are better. He has severe anemia but he is asymptomatic. HIs creatinine is better and his light chain is at baseline.\par \par 9/27/17\par He is here for follow up. He needed  blood transfusion and hgb has been stable at 8 since than. He is also status post Venofer x2. He is on Procrit\par  40,000 units every 2 weeks. He feels well otherwise. \par \par 11/20/17\par Patient feels well and has no complaints. States he was taking Revlimid 5mg q48hrs prior to stopping for low counts. Patient will get Procrit and Darzalex. Patient will be restarted on Revilimid 2.5mg. Patient will continue every 2week Darzalex until week 25 then it becomes every 4 weeks. \par \par 12/18/17\par He is here for follow up. He is doing well. Blood work from last month still in CR. Cr and counts are better. He has no complaints and feel great.\par \par 1/22/18\par He is here for follow up. He is recovering from a cold. He is taking a prednisone taper given by his PMD. Had mules pain from cough.\par \par 2/26/18\par He is doing well. He has no complaints. His labs have been stable his light chain only went up slightly but ratio is normal. \par \par 3/27/18\par He is here for follow up he is doing well. He had blood work in [facility]  where he saw Dr. Rod 3/26/18 WBC 6.7, HgB 11.3, Pts 140, LFT WNL, , Ca 9.7. Cr. 5.96, alb 3.2\par \par He feels well. \par \par 4/23/18\par No events feels well. No complaints\par \par 5/21/18\par He is here for follow up. Doing well. Has no complaints\par \par 6/18/18\par His hgb has been dropping on monthly Procrit, His ligh chains are normal last Serum LUCIA from april 23 showed IgG kappa band thus he achieved aVGPR for DRD. He has no complaints.\par \par 7/16/18\par He is here for follow up. Doing well. No complaints. His kitten bit him.\par \par 8/13/18\par He is doing well. No complains. His HGb has been over 11 gm/dl So we are holding his procrit.\par \par 9/10/18\par He is here for follow up. He Myleoma panel is stable. Hgb is stable. He is due for his vaccinations 2 years post Auto and will have them today. He started Revlimid about 1 week ago. He has no complaints.\par \par 10/10/18\par He is here for follow up. He is doing well. BP is a bit high will watch. He has no complaints. He has his revlamid.\par \par 11/7/18\par He is here for follow up. His last hgb was under 10 gm/dl.  He is doing well otherwise. Was started on something for his blood pressure by Dr. Allen but he does not know what it is. He has his Revlimid\par \par 12/10/18\par Patient is here for a follow-up visit.  He is feeling well with no complaints.  Most recent CBC is stable.  Patient denies fever, chills, nausea, vomiting.  The light chains are picking up but it may be related to the CKD since LUCIA is negative and ratio is normal.\par \par 1/21/19\par He is here for follow-up with his wife.  His Kappa+Lambda have slightly risen, but the ratio is stable, so this is likely due to his CKD.   Most recent CBC is stable.  He received a prescription for Revlimid 5mg so he has been taking them every other day.  He is otherwise feeling well.  The rest of the CRAB criteria remains the same.  \par \par 2/25/19\par He is here with his wife.  As both his light chains are increasing, it is likely it is from his renal failure.  His most recent Hgb is 10.8g/dL, so we may stop when he nears 11.0g/dL.  He will see Dr. Rod on 3/25/19.   \par \par 3/20/19\par He is here for follow-up of MM with his wife.  His Hgb is stable at 11.3g/dL.  His BP is slightly elevated today. He will speak with his PCP.  He is otherwise feeling well.\par \par 4/22/19\par As of February, the immunofixation is picking up weak IgG Kappa bands no CRAB criteria  or significant rise in paraprotein that suggests progression .  We will continue with Revlimid at this time.   They went to see Dr. Rod from [facility] last month.   He feels well otherwise.\par \par 5/20/19\par He is here for follow up. He is doing well. His HgB is coming down so we will restart Procrit today. MM panel from March stable.\par \par 6/17/19\par He is here for follow-up of MM with his wife.  The MM panel from May was stable.  His hgb is 11.8g/dL today, so he does not need procrit.  He is feeling well. \par \par 7/15/19\par He is here for follow up. His  Myeloma panel from June is stable, Serum LUCIA remains negative.\par \par 8/12/19\par He is here for follow up. paraproteins from July are stable with negative serum LUCIA. His HgB is 10.9. he has no complaints. \par \par 9/16/19\par He is here for follow up. On day 4 of Revlimid. No complaints. HGB is stable. Serologically still in CR.\par \par 10/21/19\par he is here for follow up. No deviance of progression on last labs. he feels well.  On his off week of Revlimid. No complaints. to get his flu shot and pneumococcal vaccine with PMD next week.\par \par 11/18/19\par He is here for follow up of MM.  He is feeling well today.  He is seated with his wife.  He denies any bony pain or weight loss.  This is his off week for Revlimid.  No new complaints.  He is due to  start new cycle of Revlimid next Monday.  Hgb has improved to 11g/dL so we will hold procrit for now.

## 2019-11-21 NOTE — RESULTS/DATA
[FreeTextEntry1] :  Result Name Results Units Reference Range \par      PET CT WHOLE BODY TOP OF SKULL TO TI   SEE TEXT       \par     Patient Name: MOR, AUGUST : 1950\par Patient ID: [id number]\par Account: [id number]\par Patient Location: [facility]\par Accession: [id number]\par Procedure: PET CT WHOLE BODY TOP OF SKULL TO TIP OF TOES SUBSEQUENT 250-0036\par Date of Exam: 2017 11:09 AM\par Attending Physician: JARVIS LOW\par Requesting Physician: JARVIS LOW MD\par Clinical History / Reason for exam: FDG PET CT STUDY:\par Reason for examination: Tumor imaging - PET with concurrently acquired CT for\par attenuation correction and anatomic localization;  top of the skull  to toes/\par [address]646 multiple myeloma, subsequent treatment strategy.\par ICD-10 code:C90.0\par History: 66-year-old male patient with history of multiple myeloma, last\par chemotherapy received was in 2017. Patient had bone biopsy on\par 2017.\par Blood glucose pre injection 102 mg/dL\par Technique:\par Approximately 45 minutes after the intravenous administration of 13.4  mCi\par 18-Fluorine FDG, whole body PET images were acquired from top of  the skull to\par toes. In addition, non-iv and non oral contrast, low dose, non - diagnostic CT\par was acquired for attenuation correction and anatomic correlation only.\par Findings:\par Comparison: Prior PET CT study done on July 3, 2015. Correlation was performed\par with the skeletal survey done on 2016.\par Head /Neck: Physiologic FDG uptake is seen in the visualized region of the\par brain, pharyngeal lymphoid tissue, and salivary glands.\par Chest:\par Physiologic FDG avid PET is seen in the mediastinal blood pool and myocardium.\par Chest wall: Unremarkable\par Lungs: No abnormal uptake.\par Mediastinum/Pleura/pericardium: No abnormal uptake.\par Thoracic/ axillary lymph nodes: No abnormal uptake.\par Hepatobiliary: Unremarkable.\par Gallbladder: Multiple gallstones.\par Spleen: No abnormal uptake\par Pancreas: No abnormal uptake.\par Adrenal glands: No abnormal uptake.\par Kidneys/ureters/bladder: Normal excretory activity is demonstrated.\par Abdominal pelvic lymph nodes: No abnormal uptake.\par Bowel/peritoneum/mesentery: No abnormal uptake.\par Pelvic organs: Unremarkable. No abnormal increased uptake. Prosthetic\par calcifications.\par Bones/soft tissues: Osteoarthritic changes are identified in the bones. \par Patient\par has multiple extensive lytic lesions in the skeletal, seen in the skeletal\par survey  for details see the report  on 2016.\par In the prior examination there was PET positive lytic lesion right fifth rib\par with a max SUV 2.6, now non-FDG avid   and L5 vertebral body on the left side\par with a max SUV 3.8, now non-FDG avid with multiple other scattered lytic\par lesions, non-FDG avid.\par No abnormal increased uptake is seen in the lower extremities.\par Impression:\par 1. No new areas of abnormal increased uptake is seen.\par 2. Previously seen abnormal increased uptake seen in the right fifth rib and \par L5\par vertebral body on the left side, at this time both regions are non-FDG avid.\par 3. Multiple extensive lytic lesions in the skeletal consistent with multiple\par myeloma.\par 4. No other areas of abnormal increased uptake is seen.\par I the attending attest that I have personally reviewed these images and agree\par with this report.\par \par  \par

## 2019-11-25 ENCOUNTER — LABORATORY RESULT (OUTPATIENT)
Age: 69
End: 2019-11-25

## 2019-11-25 ENCOUNTER — APPOINTMENT (OUTPATIENT)
Dept: HEMATOLOGY ONCOLOGY | Facility: CLINIC | Age: 69
End: 2019-11-25

## 2019-11-25 LAB
HCT VFR BLD CALC: 38.5 %
HGB BLD-MCNC: 12 G/DL
MCHC RBC-ENTMCNC: 25.7 PG
MCHC RBC-ENTMCNC: 31.2 G/DL
MCV RBC AUTO: 82.4 FL
PLATELET # BLD AUTO: 203 K/UL
PMV BLD: 10.9 FL
RBC # BLD: 4.67 M/UL
RBC # FLD: 22.3 %
WBC # FLD AUTO: 5.32 K/UL

## 2019-11-29 ENCOUNTER — APPOINTMENT (OUTPATIENT)
Dept: INFUSION THERAPY | Facility: CLINIC | Age: 69
End: 2019-11-29

## 2019-12-09 ENCOUNTER — RX RENEWAL (OUTPATIENT)
Age: 69
End: 2019-12-09

## 2019-12-12 ENCOUNTER — APPOINTMENT (OUTPATIENT)
Dept: INFUSION THERAPY | Facility: CLINIC | Age: 69
End: 2019-12-12

## 2019-12-23 ENCOUNTER — LABORATORY RESULT (OUTPATIENT)
Age: 69
End: 2019-12-23

## 2019-12-23 ENCOUNTER — APPOINTMENT (OUTPATIENT)
Dept: HEMATOLOGY ONCOLOGY | Facility: CLINIC | Age: 69
End: 2019-12-23
Payer: MEDICARE

## 2019-12-23 VITALS
SYSTOLIC BLOOD PRESSURE: 150 MMHG | DIASTOLIC BLOOD PRESSURE: 91 MMHG | HEART RATE: 78 BPM | HEIGHT: 66 IN | TEMPERATURE: 98 F | BODY MASS INDEX: 25.71 KG/M2 | WEIGHT: 160 LBS

## 2019-12-23 LAB
ALBUMIN SERPL ELPH-MCNC: 4.1 G/DL
ALP BLD-CCNC: 55 U/L
ALT SERPL-CCNC: 8 U/L
ANION GAP SERPL CALC-SCNC: 17 MMOL/L
AST SERPL-CCNC: 10 U/L
BILIRUB SERPL-MCNC: <0.2 MG/DL
BUN SERPL-MCNC: 76 MG/DL
CALCIUM SERPL-MCNC: 10.1 MG/DL
CHLORIDE SERPL-SCNC: 103 MMOL/L
CO2 SERPL-SCNC: 18 MMOL/L
CREAT SERPL-MCNC: 5.5 MG/DL
GLUCOSE SERPL-MCNC: 100 MG/DL
HCT VFR BLD CALC: 36.3 %
HGB BLD-MCNC: 11.2 G/DL
MCHC RBC-ENTMCNC: 24.8 PG
MCHC RBC-ENTMCNC: 30.9 G/DL
MCV RBC AUTO: 80.5 FL
PLATELET # BLD AUTO: 198 K/UL
PMV BLD: 9.1 FL
POTASSIUM SERPL-SCNC: 4.7 MMOL/L
PROT SERPL-MCNC: 6.8 G/DL
RBC # BLD: 4.51 M/UL
RBC # FLD: 20 %
SODIUM SERPL-SCNC: 138 MMOL/L
WBC # FLD AUTO: 7.38 K/UL

## 2019-12-23 PROCEDURE — 99213 OFFICE O/P EST LOW 20 MIN: CPT

## 2019-12-23 NOTE — RESULTS/DATA
[FreeTextEntry1] :  Result Name Results Units Reference Range \par      PET CT WHOLE BODY TOP OF SKULL TO TI   SEE TEXT       \par     Patient Name: MOR, AUGUST : 1950\par Patient ID: [id number]\par Account: [id number]\par Patient Location: [facility]\par Accession: [id number]\par Procedure: PET CT WHOLE BODY TOP OF SKULL TO TIP OF TOES SUBSEQUENT 250-0036\par Date of Exam: 2017 11:09 AM\par Attending Physician: JARVIS LOW\par Requesting Physician: JARVIS LOW MD\par Clinical History / Reason for exam: FDG PET CT STUDY:\par Reason for examination: Tumor imaging - PET with concurrently acquired CT for\par attenuation correction and anatomic localization;  top of the skull  to toes/\par [address]86 multiple myeloma, subsequent treatment strategy.\par ICD-10 code:C90.0\par History: 66-year-old male patient with history of multiple myeloma, last\par chemotherapy received was in 2017. Patient had bone biopsy on\par 2017.\par Blood glucose pre injection 102 mg/dL\par Technique:\par Approximately 45 minutes after the intravenous administration of 13.4  mCi\par 18-Fluorine FDG, whole body PET images were acquired from top of  the skull to\par toes. In addition, non-iv and non oral contrast, low dose, non - diagnostic CT\par was acquired for attenuation correction and anatomic correlation only.\par Findings:\par Comparison: Prior PET CT study done on July 3, 2015. Correlation was performed\par with the skeletal survey done on 2016.\par Head /Neck: Physiologic FDG uptake is seen in the visualized region of the\par brain, pharyngeal lymphoid tissue, and salivary glands.\par Chest:\par Physiologic FDG avid PET is seen in the mediastinal blood pool and myocardium.\par Chest wall: Unremarkable\par Lungs: No abnormal uptake.\par Mediastinum/Pleura/pericardium: No abnormal uptake.\par Thoracic/ axillary lymph nodes: No abnormal uptake.\par Hepatobiliary: Unremarkable.\par Gallbladder: Multiple gallstones.\par Spleen: No abnormal uptake\par Pancreas: No abnormal uptake.\par Adrenal glands: No abnormal uptake.\par Kidneys/ureters/bladder: Normal excretory activity is demonstrated.\par Abdominal pelvic lymph nodes: No abnormal uptake.\par Bowel/peritoneum/mesentery: No abnormal uptake.\par Pelvic organs: Unremarkable. No abnormal increased uptake. Prosthetic\par calcifications.\par Bones/soft tissues: Osteoarthritic changes are identified in the bones. \par Patient\par has multiple extensive lytic lesions in the skeletal, seen in the skeletal\par survey  for details see the report  on 2016.\par In the prior examination there was PET positive lytic lesion right fifth rib\par with a max SUV 2.6, now non-FDG avid   and L5 vertebral body on the left side\par with a max SUV 3.8, now non-FDG avid with multiple other scattered lytic\par lesions, non-FDG avid.\par No abnormal increased uptake is seen in the lower extremities.\par Impression:\par 1. No new areas of abnormal increased uptake is seen.\par 2. Previously seen abnormal increased uptake seen in the right fifth rib and \par L5\par vertebral body on the left side, at this time both regions are non-FDG avid.\par 3. Multiple extensive lytic lesions in the skeletal consistent with multiple\par myeloma.\par 4. No other areas of abnormal increased uptake is seen.\par I the attending attest that I have personally reviewed these images and agree\par with this report.\par \par  \par

## 2019-12-23 NOTE — REVIEW OF SYSTEMS
[Negative] : ENT [Fever] : no fever [Chills] : no chills [Fatigue] : no fatigue [Dry Eyes] : no dryness of the eyes [Mucosal Pain] : no mucosal pain [Leg Claudication] : no intermittent leg claudication [SOB on Exertion] : no shortness of breath during exertion [Lower Ext Edema] : no lower extremity edema [Abdominal Pain] : no abdominal pain [Muscle Pain] : no muscle pain [Constipation] : no constipation

## 2019-12-23 NOTE — HISTORY OF PRESENT ILLNESS
[Therapy: ___] : Therapy: [unfilled] [de-identified] : REASON FOR FOLLOWUP:  Maintenance Velcade for lambda light chain myeloma, status post autologous stem cell transplant.\par \par TREATMENT HISTORY:  On 05/02/2015, he was diagnosed with lambda light chain multiple myeloma when he had a syncopal episode in [place] and was found to be in acute renal failure.  He underwent a bone marrow biopsy that confirmed lambda light chain multiple myeloma.  His beta2 microglobulin was initially 27.1 on diagnosis.  His lambda light chain prior to treatment was as high as 04241.2 on 02/27/2015.  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  There was also diffuse acute tubular injury and modular tubular atrophy with interstitial fibrosis initially on dialysis, but currently been off dialysis for approximately one year.\par \par His treatment summary is as follows.  He initiated Velcade, Cytoxan, and dexamethasone, cycle started on 03/13/2015.  He completed four cycles on 05/08/2015.  He then underwent an autologous bone marrow transplant in 07/2015 at [place].  He has been on maintenance Velcade but in 6/17 was started on DRD due to  rising lambda with normal kappa.\par \par Mr. Motta is a very pleasant 66yearold gentleman with history of lambda light chain multiple myeloma, treated as above.\par  [FreeTextEntry1] : DRD started  on 6/21/17 went on  Revlimid maintenance 6/18/18 [de-identified] : He presents today for followup she is currently on maintenance Velcade.his blood work from October 27 demonstrated a stage a faint lambda band on urine immunofixation. Nomi a count 7.66 hemoglobin 8.3 platelet count of 132. The SPEP was normal. Creatinine was stable at 5.93 potassium was 5.2 total protein 5.9 unremarkable LFTs free kappa lambda ratio normal 1.27 with free kappa at 42 and free lambda at 33.1. Most recent blood work from 1110 2016 shows a hemoglobin of 7.8. \par \par he was recently seen by Dr. Rod in  [place]. He had blood work done and reports that everything was stable. Dr. Rod also recommended to decrease his Velcade dose to 1.3 mg/m2\par \par He has no complaints. He denies any neuropathy.\par \par 1/4/17\par Presents for follow up today. Doing well on Maintenance Velcade.  No complaints. No neuropathy. \par \par 2/1/17\par He presents for follow up today. He has no complaints. Tolerating Velcade well.\par \par 4/5/17\par Doing well No complaints blood work from last visit was stable.\par \par 5/1/17\par He presents for follow up. He had increasing lambda light chains. Repeat was stable. A bone marrow showed minimal plasma cells and PET CT did not show any new activity.  He has no complaints.\par \par 6/5/2017\par Presents for follow up. He is doing well. He has some fatigue. His  Lambda has been slowly increasing. He has no other complaints.  No neuropathy\par \par 6/12/17\par He was brought back to discuss his lab work. His Lambda has been increasing with stable Kappa as well as slight worsening of his of creatinine He has no complaints.\par \par 7/12/17\par He has been on DRD. His HgB dropped to 5.9 and he is s/p PRBC transfusion.  He finished Revlimid on 7/9. His only complaint is leg crams at night. \par \par 8/9/17\par He is here for follow up. His cramps went away with the lower dose of Revlimid. His His lambda is coming down. He saw Dr. Rod last week as per patient.\par \par 8/30/27\par He is doing well. His legs cramps are better. He has severe anemia but he is asymptomatic. HIs creatinine is better and his light chain is at baseline.\par \par 9/27/17\par He is here for follow up. He needed  blood transfusion and hgb has been stable at 8 since than. He is also status post Venofer x2. He is on Procrit\par  40,000 units every 2 weeks. He feels well otherwise. \par \par 11/20/17\par Patient feels well and has no complaints. States he was taking Revlimid 5mg q48hrs prior to stopping for low counts. Patient will get Procrit and Darzalex. Patient will be restarted on Revilimid 2.5mg. Patient will continue every 2week Darzalex until week 25 then it becomes every 4 weeks. \par \par 12/18/17\par He is here for follow up. He is doing well. Blood work from last month still in CR. Cr and counts are better. He has no complaints and feel great.\par \par 1/22/18\par He is here for follow up. He is recovering from a cold. He is taking a prednisone taper given by his PMD. Had mules pain from cough.\par \par 2/26/18\par He is doing well. He has no complaints. His labs have been stable his light chain only went up slightly but ratio is normal. \par \par 3/27/18\par He is here for follow up he is doing well. He had blood work in [facility]  where he saw Dr. Rod 3/26/18 WBC 6.7, HgB 11.3, Pts 140, LFT WNL, , Ca 9.7. Cr. 5.96, alb 3.2\par \par He feels well. \par \par 4/23/18\par No events feels well. No complaints\par \par 5/21/18\par He is here for follow up. Doing well. Has no complaints\par \par 6/18/18\par His hgb has been dropping on monthly Procrit, His ligh chains are normal last Serum LUCIA from april 23 showed IgG kappa band thus he achieved aVGPR for DRD. He has no complaints.\par \par 7/16/18\par He is here for follow up. Doing well. No complaints. His kitten bit him.\par \par 8/13/18\par He is doing well. No complains. His HGb has been over 11 gm/dl So we are holding his procrit.\par \par 9/10/18\par He is here for follow up. He Myleoma panel is stable. Hgb is stable. He is due for his vaccinations 2 years post Auto and will have them today. He started Revlimid about 1 week ago. He has no complaints.\par \par 10/10/18\par He is here for follow up. He is doing well. BP is a bit high will watch. He has no complaints. He has his revlamid.\par \par 11/7/18\par He is here for follow up. His last hgb was under 10 gm/dl.  He is doing well otherwise. Was started on something for his blood pressure by Dr. Allen but he does not know what it is. He has his Revlimid\par \par 12/10/18\par Patient is here for a follow-up visit.  He is feeling well with no complaints.  Most recent CBC is stable.  Patient denies fever, chills, nausea, vomiting.  The light chains are picking up but it may be related to the CKD since LUCIA is negative and ratio is normal.\par \par 1/21/19\par He is here for follow-up with his wife.  His Kappa+Lambda have slightly risen, but the ratio is stable, so this is likely due to his CKD.   Most recent CBC is stable.  He received a prescription for Revlimid 5mg so he has been taking them every other day.  He is otherwise feeling well.  The rest of the CRAB criteria remains the same.  \par \par 2/25/19\par He is here with his wife.  As both his light chains are increasing, it is likely it is from his renal failure.  His most recent Hgb is 10.8g/dL, so we may stop when he nears 11.0g/dL.  He will see Dr. Rod on 3/25/19.   \par \par 3/20/19\par He is here for follow-up of MM with his wife.  His Hgb is stable at 11.3g/dL.  His BP is slightly elevated today. He will speak with his PCP.  He is otherwise feeling well.\par \par 4/22/19\par As of February, the immunofixation is picking up weak IgG Kappa bands no CRAB criteria  or significant rise in paraprotein that suggests progression .  We will continue with Revlimid at this time.   They went to see Dr. Rod from [facility] last month.   He feels well otherwise.\par \par 5/20/19\par He is here for follow up. He is doing well. His HgB is coming down so we will restart Procrit today. MM panel from March stable.\par \par 6/17/19\par He is here for follow-up of MM with his wife.  The MM panel from May was stable.  His hgb is 11.8g/dL today, so he does not need procrit.  He is feeling well. \par \par 7/15/19\par He is here for follow up. His  Myeloma panel from June is stable, Serum LUCIA remains negative.\par \par 8/12/19\par He is here for follow up. paraproteins from July are stable with negative serum LUCIA. His HgB is 10.9. he has no complaints. \par \par 9/16/19\par He is here for follow up. On day 4 of Revlimid. No complaints. HGB is stable. Serologically still in CR.\par \par 10/21/19\par he is here for follow up. No deviance of progression on last labs. he feels well.  On his off week of Revlimid. No complaints. to get his flu shot and pneumococcal vaccine with PMD next week.\par \par 11/18/19\par He is here for follow up of MM.  He is feeling well today.  He is seated with his wife.  He denies any bony pain or weight loss.  This is his off week for Revlimid.  No new complaints.  He is due to  start new cycle of Revlimid next Monday.  Hgb has improved to 11g/dL so we will hold procrit for now.  \par \par 12/23/19\par Patient is here for a follow-up visit for myeloma.  He is feeling well with no new complaints, tolerating revlimid 2.5 every other day.  This is his D2 of the next cycle of Revlimid, no refill needed at this time.  Most recent CBC is stable with hgb 11.2g/dL.  Patient denies fever, chills, nausea, vomiting, new pain or bleeding.  We will continue to hold Procrit for now.

## 2019-12-23 NOTE — ASSESSMENT
[FreeTextEntry1] : #Free light chain multiple myeloma, specifically lambda.  Initial beta-2 was 27.1.  His bone marrow cytogenetics was normal.  He is status post four cycles of VCD.  This is followed by autologous bone marrow transplant in 07/2015,he has been on  weekly Velcade maintenance, He is also being followed by Dr. Krysta Rod in [place].  He currently takes his acyclovir.  Last myeloma panel  showed increasing lambda light chains but marrow showed minimal plasma cells with PET CT no progression.  HIs lambda continues to increase supporting biochemical recurrence with worsening creatinine  He was started on Daratumumab, Revlimid 5 mg daily d1-21 and  Dexamethasone  40 mg weekly PO with great response and is nos most likey in CR. Patient was taking Revlimid every other day and it was held for pancytopenia.  On 6/18/18 went on Revlimid maintenance . Achieved a VGPR/CR. Since 2/2019 Serum LUCIA is now positive thus Relapse from complete response,  but  no evidence of clinical relapse thus would keep going with Revlimid. If affected light chain begins to rise constantly  will check PET/CT possible repeat marrow and will consider change in therapy, multiple options such as restating Esthela and going to pomalidomide with Dex or Pom Dex and Slam7 inhibition or Esthela, carfilzomib and dex or  clinical trial if available  \par - c/w Revlimid 2.5 mg every other day d1-21 every 28 days (takes 10 tabs) for now\par - Myeloma panel resent\par - refuses colonoscopy \par - CBC monthly\par \par #Chronic kidney disease from multiple myeloma.  He has  been on dialysis in the past but is now off.  \par - creatinine has been relatively stable.  He follows up with Dr. Allen.\par \par #Normocytic anemia, related to chronic kidney disease and Revlimid was on   Procrit  60,000 units every  other  week and s/p IV iron .  On hold again since Hgb >10 g/dL\par - will keep saturation over 20% and Ferritin over 100 if goes back on Procrit  \par \par 4. Moderate to severe   thrombocytopenia most likely from Revlimid. \par - Resolved\par \par 5. HTN \par - on meds, will follow-up with his PCP for adjustment, stable today but systolic is still a bit high\par \par RTC in 1 month, will call with blood work results if any changes

## 2019-12-23 NOTE — CONSULT LETTER
[Dear  ___] : Dear  [unfilled], [Courtesy Letter:] : I had the pleasure of seeing your patient, [unfilled], in my office today. [Referral Closing:] : Thank you very much for seeing this patient.  If you have any questions, please do not hesitate to contact me. [Sincerely,] : Sincerely, [DrOrquidea  ___] : Dr. RECIO [FreeTextEntry3] : Andrew Pantoja

## 2019-12-26 ENCOUNTER — APPOINTMENT (OUTPATIENT)
Dept: INFUSION THERAPY | Facility: CLINIC | Age: 69
End: 2019-12-26

## 2019-12-26 LAB
ALBUMIN MFR SERPL ELPH: 57 %
ALBUMIN SERPL-MCNC: 3.9 G/DL
ALBUMIN/GLOB SERPL: 1.3 RATIO
ALPHA1 GLOB MFR SERPL ELPH: 5.7 %
ALPHA1 GLOB SERPL ELPH-MCNC: 0.4 G/DL
ALPHA2 GLOB MFR SERPL ELPH: 11.5 %
ALPHA2 GLOB SERPL ELPH-MCNC: 0.8 G/DL
B-GLOBULIN MFR SERPL ELPH: 10.5 %
B-GLOBULIN SERPL ELPH-MCNC: 0.7 G/DL
DEPRECATED KAPPA LC FREE/LAMBDA SER: 1.41 RATIO
GAMMA GLOB FLD ELPH-MCNC: 1 G/DL
GAMMA GLOB MFR SERPL ELPH: 15.3 %
IGA SER QL IEP: 214 MG/DL
IGG SER QL IEP: 1059 MG/DL
IGM SER QL IEP: 59 MG/DL
INTERPRETATION SERPL IEP-IMP: NORMAL
KAPPA LC CSF-MCNC: 5.38 MG/DL
KAPPA LC SERPL-MCNC: 7.57 MG/DL
M PROTEIN MFR SERPL ELPH: NORMAL
M PROTEIN SPEC IFE-MCNC: NORMAL
MONOCLON BAND OBS SERPL: NORMAL
PROT SERPL-MCNC: 6.8 G/DL
PROT SERPL-MCNC: 6.8 G/DL

## 2020-01-07 ENCOUNTER — RX RENEWAL (OUTPATIENT)
Age: 70
End: 2020-01-07

## 2020-01-20 ENCOUNTER — LABORATORY RESULT (OUTPATIENT)
Age: 70
End: 2020-01-20

## 2020-01-20 ENCOUNTER — APPOINTMENT (OUTPATIENT)
Dept: HEMATOLOGY ONCOLOGY | Facility: CLINIC | Age: 70
End: 2020-01-20
Payer: MEDICARE

## 2020-01-20 ENCOUNTER — OUTPATIENT (OUTPATIENT)
Dept: OUTPATIENT SERVICES | Facility: HOSPITAL | Age: 70
LOS: 1 days | Discharge: HOME | End: 2020-01-20

## 2020-01-20 VITALS
HEART RATE: 74 BPM | SYSTOLIC BLOOD PRESSURE: 155 MMHG | RESPIRATION RATE: 14 BRPM | DIASTOLIC BLOOD PRESSURE: 71 MMHG | HEIGHT: 66 IN | WEIGHT: 155 LBS | BODY MASS INDEX: 24.91 KG/M2

## 2020-01-20 DIAGNOSIS — C90.00 MULTIPLE MYELOMA NOT HAVING ACHIEVED REMISSION: ICD-10-CM

## 2020-01-20 DIAGNOSIS — N18.9 CHRONIC KIDNEY DISEASE, UNSPECIFIED: ICD-10-CM

## 2020-01-20 LAB
ALBUMIN SERPL ELPH-MCNC: 4.3 G/DL
ALP BLD-CCNC: 57 U/L
ALT SERPL-CCNC: 8 U/L
ANION GAP SERPL CALC-SCNC: 15 MMOL/L
AST SERPL-CCNC: 10 U/L
BILIRUB SERPL-MCNC: 0.2 MG/DL
BUN SERPL-MCNC: 70 MG/DL
CALCIUM SERPL-MCNC: 10.1 MG/DL
CHLORIDE SERPL-SCNC: 105 MMOL/L
CO2 SERPL-SCNC: 19 MMOL/L
CREAT SERPL-MCNC: 6 MG/DL
GLUCOSE SERPL-MCNC: 120 MG/DL
HCT VFR BLD CALC: 36.4 %
HGB BLD-MCNC: 11.5 G/DL
MCHC RBC-ENTMCNC: 24.9 PG
MCHC RBC-ENTMCNC: 31.6 G/DL
MCV RBC AUTO: 78.8 FL
PLATELET # BLD AUTO: 188 K/UL
PMV BLD: 9.5 FL
POTASSIUM SERPL-SCNC: 4.1 MMOL/L
PROT SERPL-MCNC: 6.9 G/DL
RBC # BLD: 4.62 M/UL
RBC # FLD: 19.6 %
SODIUM SERPL-SCNC: 139 MMOL/L
WBC # FLD AUTO: 5.86 K/UL

## 2020-01-20 PROCEDURE — 99213 OFFICE O/P EST LOW 20 MIN: CPT

## 2020-01-20 NOTE — REVIEW OF SYSTEMS
[Negative] : Allergic/Immunologic [Fever] : no fever [Fatigue] : no fatigue [Chills] : no chills [Dry Eyes] : no dryness of the eyes [Mucosal Pain] : no mucosal pain [Leg Claudication] : no intermittent leg claudication [Lower Ext Edema] : no lower extremity edema [SOB on Exertion] : no shortness of breath during exertion [Constipation] : no constipation [Abdominal Pain] : no abdominal pain [Muscle Pain] : no muscle pain

## 2020-01-20 NOTE — RESULTS/DATA
[FreeTextEntry1] :  Result Name Results Units Reference Range \par      PET CT WHOLE BODY TOP OF SKULL TO TI   SEE TEXT       \par     Patient Name: MOR, AUGUST : 1950\par Patient ID: [id number]\par Account: [id number]\par Patient Location: [facility]\par Accession: [id number]\par Procedure: PET CT WHOLE BODY TOP OF SKULL TO TIP OF TOES SUBSEQUENT 250-0036\par Date of Exam: 2017 11:09 AM\par Attending Physician: JARVIS LOW\par Requesting Physician: JARVIS LOW MD\par Clinical History / Reason for exam: FDG PET CT STUDY:\par Reason for examination: Tumor imaging - PET with concurrently acquired CT for\par attenuation correction and anatomic localization;  top of the skull  to toes/\par [address] multiple myeloma, subsequent treatment strategy.\par ICD-10 code:C90.0\par History: 66-year-old male patient with history of multiple myeloma, last\par chemotherapy received was in 2017. Patient had bone biopsy on\par 2017.\par Blood glucose pre injection 102 mg/dL\par Technique:\par Approximately 45 minutes after the intravenous administration of 13.4  mCi\par 18-Fluorine FDG, whole body PET images were acquired from top of  the skull to\par toes. In addition, non-iv and non oral contrast, low dose, non - diagnostic CT\par was acquired for attenuation correction and anatomic correlation only.\par Findings:\par Comparison: Prior PET CT study done on July 3, 2015. Correlation was performed\par with the skeletal survey done on 2016.\par Head /Neck: Physiologic FDG uptake is seen in the visualized region of the\par brain, pharyngeal lymphoid tissue, and salivary glands.\par Chest:\par Physiologic FDG avid PET is seen in the mediastinal blood pool and myocardium.\par Chest wall: Unremarkable\par Lungs: No abnormal uptake.\par Mediastinum/Pleura/pericardium: No abnormal uptake.\par Thoracic/ axillary lymph nodes: No abnormal uptake.\par Hepatobiliary: Unremarkable.\par Gallbladder: Multiple gallstones.\par Spleen: No abnormal uptake\par Pancreas: No abnormal uptake.\par Adrenal glands: No abnormal uptake.\par Kidneys/ureters/bladder: Normal excretory activity is demonstrated.\par Abdominal pelvic lymph nodes: No abnormal uptake.\par Bowel/peritoneum/mesentery: No abnormal uptake.\par Pelvic organs: Unremarkable. No abnormal increased uptake. Prosthetic\par calcifications.\par Bones/soft tissues: Osteoarthritic changes are identified in the bones. \par Patient\par has multiple extensive lytic lesions in the skeletal, seen in the skeletal\par survey  for details see the report  on 2016.\par In the prior examination there was PET positive lytic lesion right fifth rib\par with a max SUV 2.6, now non-FDG avid   and L5 vertebral body on the left side\par with a max SUV 3.8, now non-FDG avid with multiple other scattered lytic\par lesions, non-FDG avid.\par No abnormal increased uptake is seen in the lower extremities.\par Impression:\par 1. No new areas of abnormal increased uptake is seen.\par 2. Previously seen abnormal increased uptake seen in the right fifth rib and \par L5\par vertebral body on the left side, at this time both regions are non-FDG avid.\par 3. Multiple extensive lytic lesions in the skeletal consistent with multiple\par myeloma.\par 4. No other areas of abnormal increased uptake is seen.\par I the attending attest that I have personally reviewed these images and agree\par with this report.\par \par  \par

## 2020-01-20 NOTE — HISTORY OF PRESENT ILLNESS
[Therapy: ___] : Therapy: [unfilled] [de-identified] : REASON FOR FOLLOWUP:  Maintenance Velcade for lambda light chain myeloma, status post autologous stem cell transplant.\par \par TREATMENT HISTORY:  On 05/02/2015, he was diagnosed with lambda light chain multiple myeloma when he had a syncopal episode in [place] and was found to be in acute renal failure.  He underwent a bone marrow biopsy that confirmed lambda light chain multiple myeloma.  His beta2 microglobulin was initially 27.1 on diagnosis.  His lambda light chain prior to treatment was as high as 00924.2 on 02/27/2015.  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  There was also diffuse acute tubular injury and modular tubular atrophy with interstitial fibrosis initially on dialysis, but currently been off dialysis for approximately one year.\par \par His treatment summary is as follows.  He initiated Velcade, Cytoxan, and dexamethasone, cycle started on 03/13/2015.  He completed four cycles on 05/08/2015.  He then underwent an autologous bone marrow transplant in 07/2015 at [place].  He has been on maintenance Velcade but in 6/17 was started on DRD due to  rising lambda with normal kappa.\par \par Mr. Motta is a very pleasant 66yearold gentleman with history of lambda light chain multiple myeloma, treated as above.\par  [FreeTextEntry1] : DRD started  on 6/21/17 went on  Revlimid maintenance 6/18/18 [de-identified] : He presents today for followup she is currently on maintenance Velcade.his blood work from October 27 demonstrated a stage a faint lambda band on urine immunofixation. Nomi a count 7.66 hemoglobin 8.3 platelet count of 132. The SPEP was normal. Creatinine was stable at 5.93 potassium was 5.2 total protein 5.9 unremarkable LFTs free kappa lambda ratio normal 1.27 with free kappa at 42 and free lambda at 33.1. Most recent blood work from 1110 2016 shows a hemoglobin of 7.8. \par \par he was recently seen by Dr. Rod in  [place]. He had blood work done and reports that everything was stable. Dr. Rod also recommended to decrease his Velcade dose to 1.3 mg/m2\par \par He has no complaints. He denies any neuropathy.\par \par 1/4/17\par Presents for follow up today. Doing well on Maintenance Velcade.  No complaints. No neuropathy. \par \par 2/1/17\par He presents for follow up today. He has no complaints. Tolerating Velcade well.\par \par 4/5/17\par Doing well No complaints blood work from last visit was stable.\par \par 5/1/17\par He presents for follow up. He had increasing lambda light chains. Repeat was stable. A bone marrow showed minimal plasma cells and PET CT did not show any new activity.  He has no complaints.\par \par 6/5/2017\par Presents for follow up. He is doing well. He has some fatigue. His  Lambda has been slowly increasing. He has no other complaints.  No neuropathy\par \par 6/12/17\par He was brought back to discuss his lab work. His Lambda has been increasing with stable Kappa as well as slight worsening of his of creatinine He has no complaints.\par \par 7/12/17\par He has been on DRD. His HgB dropped to 5.9 and he is s/p PRBC transfusion.  He finished Revlimid on 7/9. His only complaint is leg crams at night. \par \par 8/9/17\par He is here for follow up. His cramps went away with the lower dose of Revlimid. His His lambda is coming down. He saw Dr. Rod last week as per patient.\par \par 8/30/27\par He is doing well. His legs cramps are better. He has severe anemia but he is asymptomatic. HIs creatinine is better and his light chain is at baseline.\par \par 9/27/17\par He is here for follow up. He needed  blood transfusion and hgb has been stable at 8 since than. He is also status post Venofer x2. He is on Procrit\par  40,000 units every 2 weeks. He feels well otherwise. \par \par 11/20/17\par Patient feels well and has no complaints. States he was taking Revlimid 5mg q48hrs prior to stopping for low counts. Patient will get Procrit and Darzalex. Patient will be restarted on Revilimid 2.5mg. Patient will continue every 2week Darzalex until week 25 then it becomes every 4 weeks. \par \par 12/18/17\par He is here for follow up. He is doing well. Blood work from last month still in CR. Cr and counts are better. He has no complaints and feel great.\par \par 1/22/18\par He is here for follow up. He is recovering from a cold. He is taking a prednisone taper given by his PMD. Had mules pain from cough.\par \par 2/26/18\par He is doing well. He has no complaints. His labs have been stable his light chain only went up slightly but ratio is normal. \par \par 3/27/18\par He is here for follow up he is doing well. He had blood work in [facility]  where he saw Dr. Rod 3/26/18 WBC 6.7, HgB 11.3, Pts 140, LFT WNL, , Ca 9.7. Cr. 5.96, alb 3.2\par \par He feels well. \par \par 4/23/18\par No events feels well. No complaints\par \par 5/21/18\par He is here for follow up. Doing well. Has no complaints\par \par 6/18/18\par His hgb has been dropping on monthly Procrit, His ligh chains are normal last Serum LUCIA from april 23 showed IgG kappa band thus he achieved aVGPR for DRD. He has no complaints.\par \par 7/16/18\par He is here for follow up. Doing well. No complaints. His kitten bit him.\par \par 8/13/18\par He is doing well. No complains. His HGb has been over 11 gm/dl So we are holding his procrit.\par \par 9/10/18\par He is here for follow up. He Myleoma panel is stable. Hgb is stable. He is due for his vaccinations 2 years post Auto and will have them today. He started Revlimid about 1 week ago. He has no complaints.\par \par 10/10/18\par He is here for follow up. He is doing well. BP is a bit high will watch. He has no complaints. He has his revlamid.\par \par 11/7/18\par He is here for follow up. His last hgb was under 10 gm/dl.  He is doing well otherwise. Was started on something for his blood pressure by Dr. Allen but he does not know what it is. He has his Revlimid\par \par 12/10/18\par Patient is here for a follow-up visit.  He is feeling well with no complaints.  Most recent CBC is stable.  Patient denies fever, chills, nausea, vomiting.  The light chains are picking up but it may be related to the CKD since LUCIA is negative and ratio is normal.\par \par 1/21/19\par He is here for follow-up with his wife.  His Kappa+Lambda have slightly risen, but the ratio is stable, so this is likely due to his CKD.   Most recent CBC is stable.  He received a prescription for Revlimid 5mg so he has been taking them every other day.  He is otherwise feeling well.  The rest of the CRAB criteria remains the same.  \par \par 2/25/19\par He is here with his wife.  As both his light chains are increasing, it is likely it is from his renal failure.  His most recent Hgb is 10.8g/dL, so we may stop when he nears 11.0g/dL.  He will see Dr. Rod on 3/25/19.   \par \par 3/20/19\par He is here for follow-up of MM with his wife.  His Hgb is stable at 11.3g/dL.  His BP is slightly elevated today. He will speak with his PCP.  He is otherwise feeling well.\par \par 4/22/19\par As of February, the immunofixation is picking up weak IgG Kappa bands no CRAB criteria  or significant rise in paraprotein that suggests progression .  We will continue with Revlimid at this time.   They went to see Dr. Rod from [facility] last month.   He feels well otherwise.\par \par 5/20/19\par He is here for follow up. He is doing well. His HgB is coming down so we will restart Procrit today. MM panel from March stable.\par \par 6/17/19\par He is here for follow-up of MM with his wife.  The MM panel from May was stable.  His hgb is 11.8g/dL today, so he does not need procrit.  He is feeling well. \par \par 7/15/19\par He is here for follow up. His  Myeloma panel from June is stable, Serum LUCIA remains negative.\par \par 8/12/19\par He is here for follow up. paraproteins from July are stable with negative serum LUCIA. His HgB is 10.9. he has no complaints. \par \par 9/16/19\par He is here for follow up. On day 4 of Revlimid. No complaints. HGB is stable. Serologically still in CR.\par \par 10/21/19\par he is here for follow up. No deviance of progression on last labs. he feels well.  On his off week of Revlimid. No complaints. to get his flu shot and pneumococcal vaccine with PMD next week.\par \par 11/18/19\par He is here for follow up of MM.  He is feeling well today.  He is seated with his wife.  He denies any bony pain or weight loss.  This is his off week for Revlimid.  No new complaints.  He is due to  start new cycle of Revlimid next Monday.  Hgb has improved to 11g/dL so we will hold procrit for now.  \par \par 12/23/19\par Patient is here for a follow-up visit for myeloma.  He is feeling well with no new complaints, tolerating revlimid 2.5 every other day.  This is his D2 of the next cycle of Revlimid, no refill needed at this time.  Most recent CBC is stable with hgb 11.2g/dL.  Patient denies fever, chills, nausea, vomiting, new pain or bleeding.  We will continue to hold Procrit for now.  \par \par 1/20/2020\par Patient is here for a follow-up visit for myeloma accompanied by his spouse.  He is feeling well with no new complaints, tolerating revlimid 2.5 every other day.  He has just begun the next cycle of Revlimid, no refill needed at this time.  Most recent CBC is stable with hgb 11.5g/dL.  Patient denies fever, chills, nausea, vomiting, new bony pain or bleeding.  We will continue to hold Procrit for now as his hgb is at target.

## 2020-01-20 NOTE — ASSESSMENT
[FreeTextEntry1] : #Free light chain multiple myeloma, specifically lambda.  Initial beta-2 was 27.1.  His bone marrow cytogenetics was normal.  He is status post four cycles of VCD.  This is followed by autologous bone marrow transplant in 07/2015,he has been on  weekly Velcade maintenance, He is also being followed by Dr. Krysta Rod in [place].  He currently takes his acyclovir.  Last myeloma panel  showed increasing lambda light chains but marrow showed minimal plasma cells with PET CT no progression.  HIs lambda continues to increase supporting biochemical recurrence with worsening creatinine  He was started on Daratumumab, Revlimid 5 mg daily d1-21 and  Dexamethasone  40 mg weekly PO with great response and is nos most likey in CR. Patient was taking Revlimid every other day and it was held for pancytopenia.  On 6/18/18 went on Revlimid maintenance . Achieved a VGPR/CR. Since 2/2019 Serum LUCIA is now positive thus Relapse from complete response,  but  no evidence of clinical relapse thus would keep going with Revlimid. If affected light chain begins to rise constantly  will check PET/CT possible repeat marrow and will consider change in therapy, multiple options such as restating Esthela and going to pomalidomide with Dex or Pom Dex and Slam7 inhibition or Esthela, carfilzomib and dex or  clinical trial if available  \par - c/w Revlimid 2.5 mg every other day d1-21 every 28 days (takes 10 tabs) for now\par - Myeloma panel resent, has been stable.\par - refuses colonoscopy \par - CBC monthly, CMP serum Immunoelectophoresis monthly \par \par #Chronic kidney disease from multiple myeloma.  He has  been on dialysis in the past but is now off.  \par - creatinine has been relatively stable.  He follows up with Dr. Allen at least twice a year.\par \par #Normocytic anemia, related to chronic kidney disease and Revlimid was on   Procrit  60,000 units every  other  week and s/p IV iron .  On hold again since Hgb >10 g/dL\par - will keep saturation over 20% and Ferritin over 100 if goes back on Procrit  \par \par 4. Moderate to severe   thrombocytopenia most likely from Revlimid. \par - Resolved\par \par 5. HTN \par - on meds, will follow-up with his PCP for adjustment, stable today but systolic is still a bit high\par \par RTC in 1 month, will call with blood work results if any changes

## 2020-01-21 LAB
ALBUMIN MFR SERPL ELPH: 58.5 %
ALBUMIN SERPL-MCNC: 3.8 G/DL
ALBUMIN/GLOB SERPL: 1.4 RATIO
ALPHA1 GLOB MFR SERPL ELPH: 4.9 %
ALPHA1 GLOB SERPL ELPH-MCNC: 0.3 G/DL
ALPHA2 GLOB MFR SERPL ELPH: 11.3 %
ALPHA2 GLOB SERPL ELPH-MCNC: 0.7 G/DL
B-GLOBULIN MFR SERPL ELPH: 10.5 %
B-GLOBULIN SERPL ELPH-MCNC: 0.7 G/DL
DEPRECATED KAPPA LC FREE/LAMBDA SER: 1.2 RATIO
GAMMA GLOB FLD ELPH-MCNC: 1 G/DL
GAMMA GLOB MFR SERPL ELPH: 14.8 %
IGA SER QL IEP: 218 MG/DL
IGG SER QL IEP: 1038 MG/DL
IGM SER QL IEP: 49 MG/DL
INTERPRETATION SERPL IEP-IMP: NORMAL
KAPPA LC CSF-MCNC: 7.18 MG/DL
KAPPA LC SERPL-MCNC: 8.59 MG/DL
M PROTEIN MFR SERPL ELPH: NORMAL
M PROTEIN SPEC IFE-MCNC: NORMAL
MONOCLON BAND OBS SERPL: NORMAL
PROT SERPL-MCNC: 6.5 G/DL
PROT SERPL-MCNC: 6.5 G/DL

## 2020-02-24 ENCOUNTER — APPOINTMENT (OUTPATIENT)
Dept: HEMATOLOGY ONCOLOGY | Facility: CLINIC | Age: 70
End: 2020-02-24
Payer: MEDICARE

## 2020-02-24 ENCOUNTER — LABORATORY RESULT (OUTPATIENT)
Age: 70
End: 2020-02-24

## 2020-02-24 VITALS
DIASTOLIC BLOOD PRESSURE: 71 MMHG | HEIGHT: 66 IN | WEIGHT: 163 LBS | BODY MASS INDEX: 26.2 KG/M2 | TEMPERATURE: 96.1 F | SYSTOLIC BLOOD PRESSURE: 149 MMHG | RESPIRATION RATE: 14 BRPM | HEART RATE: 68 BPM

## 2020-02-24 LAB
HCT VFR BLD CALC: 33.9 %
HGB BLD-MCNC: 10.8 G/DL
MCHC RBC-ENTMCNC: 24.5 PG
MCHC RBC-ENTMCNC: 31.9 G/DL
MCV RBC AUTO: 77 FL
PLATELET # BLD AUTO: 202 K/UL
PMV BLD: 9.5 FL
RBC # BLD: 4.4 M/UL
RBC # FLD: 19.8 %
WBC # FLD AUTO: 7.33 K/UL

## 2020-02-24 PROCEDURE — 99213 OFFICE O/P EST LOW 20 MIN: CPT

## 2020-02-25 LAB
ALBUMIN SERPL ELPH-MCNC: 4.2 G/DL
ALP BLD-CCNC: 61 U/L
ALT SERPL-CCNC: 9 U/L
ANION GAP SERPL CALC-SCNC: 17 MMOL/L
AST SERPL-CCNC: 10 U/L
BILIRUB SERPL-MCNC: <0.2 MG/DL
BUN SERPL-MCNC: 70 MG/DL
CALCIUM SERPL-MCNC: 10.1 MG/DL
CHLORIDE SERPL-SCNC: 101 MMOL/L
CO2 SERPL-SCNC: 19 MMOL/L
CREAT SERPL-MCNC: 6.4 MG/DL
GLUCOSE SERPL-MCNC: 101 MG/DL
POTASSIUM SERPL-SCNC: 4.2 MMOL/L
PROT SERPL-MCNC: 6.7 G/DL
SODIUM SERPL-SCNC: 137 MMOL/L

## 2020-02-27 LAB
ALBUMIN MFR SERPL ELPH: 57 %
ALBUMIN SERPL-MCNC: 3.8 G/DL
ALBUMIN/GLOB SERPL: 1.4 RATIO
ALPHA1 GLOB MFR SERPL ELPH: 5.5 %
ALPHA1 GLOB SERPL ELPH-MCNC: 0.4 G/DL
ALPHA2 GLOB MFR SERPL ELPH: 12.4 %
ALPHA2 GLOB SERPL ELPH-MCNC: 0.8 G/DL
B-GLOBULIN MFR SERPL ELPH: 10.9 %
B-GLOBULIN SERPL ELPH-MCNC: 0.7 G/DL
DEPRECATED KAPPA LC FREE/LAMBDA SER: 1.26 RATIO
GAMMA GLOB FLD ELPH-MCNC: 0.9 G/DL
GAMMA GLOB MFR SERPL ELPH: 14.2 %
IGA SER QL IEP: 215 MG/DL
IGG SER QL IEP: 1053 MG/DL
IGM SER QL IEP: 36 MG/DL
INTERPRETATION SERPL IEP-IMP: NORMAL
KAPPA LC CSF-MCNC: 7.4 MG/DL
KAPPA LC SERPL-MCNC: 9.35 MG/DL
M PROTEIN MFR SERPL ELPH: NORMAL
M PROTEIN SPEC IFE-MCNC: NORMAL
MONOCLON BAND OBS SERPL: NORMAL
PROT SERPL-MCNC: 6.6 G/DL
PROT SERPL-MCNC: 6.6 G/DL

## 2020-02-28 NOTE — RESULTS/DATA
[FreeTextEntry1] :  Result Name Results Units Reference Range \par      PET CT WHOLE BODY TOP OF SKULL TO TI   SEE TEXT       \par     Patient Name: MOR, AUGUST : 1950\par Patient ID: [id number]\par Account: [id number]\par Patient Location: [facility]\par Accession: [id number]\par Procedure: PET CT WHOLE BODY TOP OF SKULL TO TIP OF TOES SUBSEQUENT [id number]\par Date of Exam: 2017 11:09 AM\par Attending Physician: JARVIS LOW\par Requesting Physician: JARVIS LOW MD\par Clinical History / Reason for exam: FDG PET CT STUDY:\par Reason for examination: Tumor imaging - PET with concurrently acquired CT for\par attenuation correction and anatomic localization;  top of the skull  to toes/\par [id number] multiple myeloma, subsequent treatment strategy.\par ICD-10 code:C90.0\par History: 66-year-old male patient with history of multiple myeloma, last\par chemotherapy received was in 2017. Patient had bone biopsy on\par 2017.\par Blood glucose pre injection 102 mg/dL\par Technique:\par Approximately 45 minutes after the intravenous administration of 13.4  mCi\par 18-Fluorine FDG, whole body PET images were acquired from top of  the skull to\par toes. In addition, non-iv and non oral contrast, low dose, non - diagnostic CT\par was acquired for attenuation correction and anatomic correlation only.\par Findings:\par Comparison: Prior PET CT study done on July 3, 2015. Correlation was performed\par with the skeletal survey done on 2016.\par Head /Neck: Physiologic FDG uptake is seen in the visualized region of the\par brain, pharyngeal lymphoid tissue, and salivary glands.\par Chest:\par Physiologic FDG avid PET is seen in the mediastinal blood pool and myocardium.\par Chest wall: Unremarkable\par Lungs: No abnormal uptake.\par Mediastinum/Pleura/pericardium: No abnormal uptake.\par Thoracic/ axillary lymph nodes: No abnormal uptake.\par Hepatobiliary: Unremarkable.\par Gallbladder: Multiple gallstones.\par Spleen: No abnormal uptake\par Pancreas: No abnormal uptake.\par Adrenal glands: No abnormal uptake.\par Kidneys/ureters/bladder: Normal excretory activity is demonstrated.\par Abdominal pelvic lymph nodes: No abnormal uptake.\par Bowel/peritoneum/mesentery: No abnormal uptake.\par Pelvic organs: Unremarkable. No abnormal increased uptake. Prosthetic\par calcifications.\par Bones/soft tissues: Osteoarthritic changes are identified in the bones. \par Patient\par has multiple extensive lytic lesions in the skeletal, seen in the skeletal\par survey  for details see the report  on 2016.\par In the prior examination there was PET positive lytic lesion right fifth rib\par with a max SUV 2.6, now non-FDG avid   and L5 vertebral body on the left side\par with a max SUV 3.8, now non-FDG avid with multiple other scattered lytic\par lesions, non-FDG avid.\par No abnormal increased uptake is seen in the lower extremities.\par Impression:\par 1. No new areas of abnormal increased uptake is seen.\par 2. Previously seen abnormal increased uptake seen in the right fifth rib and \par L5\par vertebral body on the left side, at this time both regions are non-FDG avid.\par 3. Multiple extensive lytic lesions in the skeletal consistent with multiple\par myeloma.\par 4. No other areas of abnormal increased uptake is seen.\par I the attending attest that I have personally reviewed these images and agree\par with this report.\par \par  \par

## 2020-02-28 NOTE — ASSESSMENT
[FreeTextEntry1] : #Free light chain multiple myeloma, specifically lambda.  Initial beta-2 was 27.1.  His bone marrow cytogenetics was normal.  He is status post four cycles of VCD.  This is followed by autologous bone marrow transplant in 07/2015,he has been on  weekly Velcade maintenance, He is also being followed by Dr. Krysta Rod in [place].  He currently takes his acyclovir.  Last myeloma panel  showed increasing lambda light chains but marrow showed minimal plasma cells with PET CT no progression.  HIs lambda continues to increase supporting biochemical recurrence with worsening creatinine  He was started on Daratumumab, Revlimid 5 mg daily d1-21 and  Dexamethasone  40 mg weekly PO with great response and is nos most likey in CR. Patient was taking Revlimid every other day and it was held for pancytopenia.  On 6/18/18 went on Revlimid maintenance . Achieved a VGPR/CR. Since 2/2019 Serum LUCIA is now positive thus Relapse from complete response,  but  no evidence of clinical relapse thus would keep going with Revlimid. If affected light chain begins to rise constantly  will check PET/CT possible repeat marrow and will consider change in therapy, multiple options such as restating Esthela and going to pomalidomide with Dex or Pom Dex and Slam7 inhibition or Esthela, carfilzomib and dex or  clinical trial if available  \par - c/w Revlimid 2.5 mg every other day d1-21 every 28 days (takes 10 tabs) for now\par - Myeloma panel resent, has been stable.\par - refuses colonoscopy \par - CBC monthly, CMP serum Immunoelectrophoresis monthly \par \par #Chronic kidney disease from multiple myeloma.  He has  been on dialysis in the past but is now off.  \par - creatinine has been relatively stable.  He follows up with Dr. Allen at least twice a year.\par \par #Normocytic anemia, related to chronic kidney disease and Revlimid was on   Procrit  60,000 units every  other  week and s/p IV iron .  On hold again since Hgb >10 g/dL\par - will keep saturation over 20% and Ferritin over 100 if goes back on Procrit  \par \par 4. Moderate to severe   thrombocytopenia most likely from Revlimid. \par - Resolved\par \par 5. HTN \par - on meds, will follow-up with his PCP for adjustment, stable today but systolic is still a bit high\par \par RTC in 1 month, will call with blood work results if any changes

## 2020-02-28 NOTE — REVIEW OF SYSTEMS
[Negative] : Allergic/Immunologic [Fever] : no fever [Chills] : no chills [Fatigue] : no fatigue [Dry Eyes] : no dryness of the eyes [Mucosal Pain] : no mucosal pain [Leg Claudication] : no intermittent leg claudication [Lower Ext Edema] : no lower extremity edema [SOB on Exertion] : no shortness of breath during exertion [Abdominal Pain] : no abdominal pain [Constipation] : no constipation [Muscle Pain] : no muscle pain

## 2020-02-28 NOTE — CONSULT LETTER
[Dear  ___] : Dear  [unfilled], [Sincerely,] : Sincerely, [Referral Closing:] : Thank you very much for seeing this patient.  If you have any questions, please do not hesitate to contact me. [Courtesy Letter:] : I had the pleasure of seeing your patient, [unfilled], in my office today. [DrOrquidea  ___] : Dr. RECIO [FreeTextEntry3] : Andrew Pantoja

## 2020-02-28 NOTE — HISTORY OF PRESENT ILLNESS
[Therapy: ___] : Therapy: [unfilled] [de-identified] : REASON FOR FOLLOWUP:  Maintenance Velcade for lambda light chain myeloma, status post autologous stem cell transplant.\par \par TREATMENT HISTORY:  On 05/02/2015, he was diagnosed with lambda light chain multiple myeloma when he had a syncopal episode in [place] and was found to be in acute renal failure.  He underwent a bone marrow biopsy that confirmed lambda light chain multiple myeloma.  His beta2 microglobulin was initially 27.1 on diagnosis.  His lambda light chain prior to treatment was as high as 94576.2 on 02/27/2015.  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  There was also diffuse acute tubular injury and modular tubular atrophy with interstitial fibrosis initially on dialysis, but currently been off dialysis for approximately one year.\par \par His treatment summary is as follows.  He initiated Velcade, Cytoxan, and dexamethasone, cycle started on 03/13/2015.  He completed four cycles on 05/08/2015.  He then underwent an autologous bone marrow transplant in 07/2015 at [place].  He has been on maintenance Velcade but in 6/17 was started on DRD due to  rising lambda with normal kappa.\par \par Mr. Motta is a very pleasant 66yearold gentleman with history of lambda light chain multiple myeloma, treated as above.\par  [FreeTextEntry1] : DRD started  on 6/21/17 went on  Revlimid maintenance 6/18/18 [de-identified] : He presents today for followup she is currently on maintenance Velcade.his blood work from October 27 demonstrated a stage a faint lambda band on urine immunofixation. Nomi a count 7.66 hemoglobin 8.3 platelet count of 132. The SPEP was normal. Creatinine was stable at 5.93 potassium was 5.2 total protein 5.9 unremarkable LFTs free kappa lambda ratio normal 1.27 with free kappa at 42 and free lambda at 33.1. Most recent blood work from 1110 2016 shows a hemoglobin of 7.8. \par \par he was recently seen by Dr. Rod in  [place]. He had blood work done and reports that everything was stable. Dr. Rod also recommended to decrease his Velcade dose to 1.3 mg/m2\par \par He has no complaints. He denies any neuropathy.\par \par 1/4/17\par Presents for follow up today. Doing well on Maintenance Velcade.  No complaints. No neuropathy. \par \par 2/1/17\par He presents for follow up today. He has no complaints. Tolerating Velcade well.\par \par 4/5/17\par Doing well No complaints blood work from last visit was stable.\par \par 5/1/17\par He presents for follow up. He had increasing lambda light chains. Repeat was stable. A bone marrow showed minimal plasma cells and PET CT did not show any new activity.  He has no complaints.\par \par 6/5/2017\par Presents for follow up. He is doing well. He has some fatigue. His  Lambda has been slowly increasing. He has no other complaints.  No neuropathy\par \par 6/12/17\par He was brought back to discuss his lab work. His Lambda has been increasing with stable Kappa as well as slight worsening of his of creatinine He has no complaints.\par \par 7/12/17\par He has been on DRD. His HgB dropped to 5.9 and he is s/p PRBC transfusion.  He finished Revlimid on 7/9. His only complaint is leg crams at night. \par \par 8/9/17\par He is here for follow up. His cramps went away with the lower dose of Revlimid. His His lambda is coming down. He saw Dr. Rod last week as per patient.\par \par 8/30/27\par He is doing well. His legs cramps are better. He has severe anemia but he is asymptomatic. HIs creatinine is better and his light chain is at baseline.\par \par 9/27/17\par He is here for follow up. He needed  blood transfusion and hgb has been stable at 8 since than. He is also status post Venofer x2. He is on Procrit\par  40,000 units every 2 weeks. He feels well otherwise. \par \par 11/20/17\par Patient feels well and has no complaints. States he was taking Revlimid 5mg q48hrs prior to stopping for low counts. Patient will get Procrit and Darzalex. Patient will be restarted on Revilimid 2.5mg. Patient will continue every 2week Darzalex until week 25 then it becomes every 4 weeks. \par \par 12/18/17\par He is here for follow up. He is doing well. Blood work from last month still in CR. Cr and counts are better. He has no complaints and feel great.\par \par 1/22/18\par He is here for follow up. He is recovering from a cold. He is taking a prednisone taper given by his PMD. Had mules pain from cough.\par \par 2/26/18\par He is doing well. He has no complaints. His labs have been stable his light chain only went up slightly but ratio is normal. \par \par 3/27/18\par He is here for follow up he is doing well. He had blood work in [facility]  where he saw Dr. Rod 3/26/18 WBC 6.7, HgB 11.3, Pts 140, LFT WNL, , Ca 9.7. Cr. 5.96, alb 3.2\par \par He feels well. \par \par 4/23/18\par No events feels well. No complaints\par \par 5/21/18\par He is here for follow up. Doing well. Has no complaints\par \par 6/18/18\par His hgb has been dropping on monthly Procrit, His ligh chains are normal last Serum LUCIA from april 23 showed IgG kappa band thus he achieved aVGPR for DRD. He has no complaints.\par \par 7/16/18\par He is here for follow up. Doing well. No complaints. His kitten bit him.\par \par 8/13/18\par He is doing well. No complains. His HGb has been over 11 gm/dl So we are holding his procrit.\par \par 9/10/18\par He is here for follow up. He Myleoma panel is stable. Hgb is stable. He is due for his vaccinations 2 years post Auto and will have them today. He started Revlimid about 1 week ago. He has no complaints.\par \par 10/10/18\par He is here for follow up. He is doing well. BP is a bit high will watch. He has no complaints. He has his revlamid.\par \par 11/7/18\par He is here for follow up. His last hgb was under 10 gm/dl.  He is doing well otherwise. Was started on something for his blood pressure by Dr. Allen but he does not know what it is. He has his Revlimid\par \par 12/10/18\par Patient is here for a follow-up visit.  He is feeling well with no complaints.  Most recent CBC is stable.  Patient denies fever, chills, nausea, vomiting.  The light chains are picking up but it may be related to the CKD since LUCIA is negative and ratio is normal.\par \par 1/21/19\par He is here for follow-up with his wife.  His Kappa+Lambda have slightly risen, but the ratio is stable, so this is likely due to his CKD.   Most recent CBC is stable.  He received a prescription for Revlimid 5mg so he has been taking them every other day.  He is otherwise feeling well.  The rest of the CRAB criteria remains the same.  \par \par 2/25/19\par He is here with his wife.  As both his light chains are increasing, it is likely it is from his renal failure.  His most recent Hgb is 10.8g/dL, so we may stop when he nears 11.0g/dL.  He will see Dr. Rod on 3/25/19.   \par \par 3/20/19\par He is here for follow-up of MM with his wife.  His Hgb is stable at 11.3g/dL.  His BP is slightly elevated today. He will speak with his PCP.  He is otherwise feeling well.\par \par 4/22/19\par As of February, the immunofixation is picking up weak IgG Kappa bands no CRAB criteria  or significant rise in paraprotein that suggests progression .  We will continue with Revlimid at this time.   They went to see Dr. Rod from [facility] last month.   He feels well otherwise.\par \par 5/20/19\par He is here for follow up. He is doing well. His HgB is coming down so we will restart Procrit today. MM panel from March stable.\par \par 6/17/19\par He is here for follow-up of MM with his wife.  The MM panel from May was stable.  His hgb is 11.8g/dL today, so he does not need procrit.  He is feeling well. \par \par 7/15/19\par He is here for follow up. His  Myeloma panel from June is stable, Serum LUCIA remains negative.\par \par 8/12/19\par He is here for follow up. paraproteins from July are stable with negative serum LUCIA. His HgB is 10.9. he has no complaints. \par \par 9/16/19\par He is here for follow up. On day 4 of Revlimid. No complaints. HGB is stable. Serologically still in CR.\par \par 10/21/19\par he is here for follow up. No deviance of progression on last labs. he feels well.  On his off week of Revlimid. No complaints. to get his flu shot and pneumococcal vaccine with PMD next week.\par \par 11/18/19\par He is here for follow up of MM.  He is feeling well today.  He is seated with his wife.  He denies any bony pain or weight loss.  This is his off week for Revlimid.  No new complaints.  He is due to  start new cycle of Revlimid next Monday.  Hgb has improved to 11g/dL so we will hold procrit for now.  \par \par 12/23/19\par Patient is here for a follow-up visit for myeloma.  He is feeling well with no new complaints, tolerating revlimid 2.5 every other day.  This is his D2 of the next cycle of Revlimid, no refill needed at this time.  Most recent CBC is stable with hgb 11.2g/dL.  Patient denies fever, chills, nausea, vomiting, new pain or bleeding.  We will continue to hold Procrit for now.  \par \par 1/20/2020\par Patient is here for a follow-up visit for myeloma accompanied by his spouse.  He is feeling well with no new complaints, tolerating revlimid 2.5 every other day.  He has just begun the next cycle of Revlimid, no refill needed at this time.  Most recent CBC is stable with hgb 11.5g/dL.  Patient denies fever, chills, nausea, vomiting, new bony pain or bleeding.  We will continue to hold Procrit for now as his hgb is at target.  \par \par 2/24/2020\par Patient is here for a follow-up visit for myeloma accompanied by his spouse.   He is feeling well with no new complaints, tolerating revlimid 2.5 every other day.   Most recent CBC with hgb 10.8g/dL.  Myeloma panel from 01/2020 is stable with no quantifiable M-spike.  He is approaching week off of Revlimid and requesting refill.

## 2020-03-30 ENCOUNTER — APPOINTMENT (OUTPATIENT)
Dept: HEMATOLOGY ONCOLOGY | Facility: CLINIC | Age: 70
End: 2020-03-30

## 2020-04-20 ENCOUNTER — APPOINTMENT (OUTPATIENT)
Dept: HEMATOLOGY ONCOLOGY | Facility: CLINIC | Age: 70
End: 2020-04-20
Payer: MEDICARE

## 2020-04-20 ENCOUNTER — LABORATORY RESULT (OUTPATIENT)
Age: 70
End: 2020-04-20

## 2020-04-20 VITALS
BODY MASS INDEX: 30.86 KG/M2 | RESPIRATION RATE: 14 BRPM | TEMPERATURE: 97 F | HEART RATE: 78 BPM | WEIGHT: 192 LBS | HEIGHT: 66 IN | DIASTOLIC BLOOD PRESSURE: 77 MMHG | SYSTOLIC BLOOD PRESSURE: 151 MMHG

## 2020-04-20 LAB
ALBUMIN SERPL ELPH-MCNC: 4.3 G/DL
ALP BLD-CCNC: 55 U/L
ALT SERPL-CCNC: 8 U/L
ANION GAP SERPL CALC-SCNC: 19 MMOL/L
AST SERPL-CCNC: 9 U/L
BILIRUB SERPL-MCNC: <0.2 MG/DL
BUN SERPL-MCNC: 64 MG/DL
CALCIUM SERPL-MCNC: 9.8 MG/DL
CHLORIDE SERPL-SCNC: 103 MMOL/L
CO2 SERPL-SCNC: 19 MMOL/L
CREAT SERPL-MCNC: 6.3 MG/DL
GLUCOSE SERPL-MCNC: 112 MG/DL
HCT VFR BLD CALC: 33 %
HGB BLD-MCNC: 10.2 G/DL
MCHC RBC-ENTMCNC: 24.6 PG
MCHC RBC-ENTMCNC: 30.9 G/DL
MCV RBC AUTO: 79.5 FL
PLATELET # BLD AUTO: 182 K/UL
PMV BLD: 9 FL
POTASSIUM SERPL-SCNC: 4.2 MMOL/L
PROT SERPL-MCNC: 6.9 G/DL
RBC # BLD: 4.15 M/UL
RBC # FLD: 18.8 %
SODIUM SERPL-SCNC: 141 MMOL/L
WBC # FLD AUTO: 8.07 K/UL

## 2020-04-20 PROCEDURE — 99213 OFFICE O/P EST LOW 20 MIN: CPT

## 2020-04-20 NOTE — ASSESSMENT
[FreeTextEntry1] : #Free light chain multiple myeloma, specifically lambda.  Initial beta-2 was 27.1.  His bone marrow cytogenetics was normal.  He is status post four cycles of VCD.  This is followed by autologous bone marrow transplant in 07/2015,he has been on  weekly Velcade maintenance, He is also being followed by Dr. Krysta Rod in [place].  He currently takes his acyclovir.  Last myeloma panel  showed increasing lambda light chains but marrow showed minimal plasma cells with PET CT no progression.  HIs lambda continues to increase supporting biochemical recurrence with worsening creatinine  He was started on Daratumumab, Revlimid 5 mg daily d1-21 and  Dexamethasone  40 mg weekly PO with great response and is nos most likey in CR. Patient was taking Revlimid every other day and it was held for pancytopenia.  On 6/18/18 went on Revlimid maintenance . Achieved a VGPR/CR. Since 2/2019 Serum LUCIA is now positive thus Relapse from complete response,  but  no evidence of clinical relapse thus would keep going with Revlimid. If affected light chain begins to rise constantly  will check PET/CT possible repeat marrow and will consider change in therapy, multiple options such as restating Esthela and going to pomalidomide with Dex or Pom Dex and Slam7 inhibition or Esthela, carfilzomib and dex or  clinical trial if available  \par - c/w Revlimid 2.5 mg every other day d1-21 every 28 days (takes 10 tabs) for now\par - Myeloma panel resent, has been stable.\par - refuses colonoscopy \par - CBC monthly, CMP serum Immunoelectrophoresis monthly \par \par #Chronic kidney disease from multiple myeloma.  He has  been on dialysis in the past but is now off.  \par - creatinine has been relatively stable.  He follows up with Dr. Allen at least twice a year.\par \par #Normocytic anemia, related to chronic kidney disease and Revlimid was on   Procrit  60,000 units every  other  week and s/p IV iron .  On hold again since Hgb >10 g/dL\par - will keep saturation over 20% and Ferritin over 100 if goes back on Procrit  \par \par 4. Moderate to severe   thrombocytopenia most likely from Revlimid. \par - Resolved\par \par 5. HTN \par - on meds, will follow-up with his PCP for adjustment, stable today but systolic is still a bit high\par \par RTC in 1 month, will call with blood work results if any changes

## 2020-04-20 NOTE — RESULTS/DATA
[FreeTextEntry1] :  Result Name Results Units Reference Range \par      PET CT WHOLE BODY TOP OF SKULL TO TI   SEE TEXT       \par     Patient Name: MOR, AUGUST : 1950\par Patient ID: [id number]\par Account: [id number]\par Patient Location: [facility]\par Accession: [id number]\par Procedure: PET CT WHOLE BODY TOP OF SKULL TO TIP OF TOES SUBSEQUENT [id number]\par Date of Exam: 2017 11:09 AM\par Attending Physician: JARVIS LOW\par Requesting Physician: JARVIS LOW MD\par Clinical History / Reason for exam: FDG PET CT STUDY:\par Reason for examination: Tumor imaging - PET with concurrently acquired CT for\par attenuation correction and anatomic localization;  top of the skull  to toes/\par [address] multiple myeloma, subsequent treatment strategy.\par ICD-10 code:C90.0\par History: 66-year-old male patient with history of multiple myeloma, last\par chemotherapy received was in 2017. Patient had bone biopsy on\par 2017.\par Blood glucose pre injection 102 mg/dL\par Technique:\par Approximately 45 minutes after the intravenous administration of 13.4  mCi\par 18-Fluorine FDG, whole body PET images were acquired from top of  the skull to\par toes. In addition, non-iv and non oral contrast, low dose, non - diagnostic CT\par was acquired for attenuation correction and anatomic correlation only.\par Findings:\par Comparison: Prior PET CT study done on July 3, 2015. Correlation was performed\par with the skeletal survey done on 2016.\par Head /Neck: Physiologic FDG uptake is seen in the visualized region of the\par brain, pharyngeal lymphoid tissue, and salivary glands.\par Chest:\par Physiologic FDG avid PET is seen in the mediastinal blood pool and myocardium.\par Chest wall: Unremarkable\par Lungs: No abnormal uptake.\par Mediastinum/Pleura/pericardium: No abnormal uptake.\par Thoracic/ axillary lymph nodes: No abnormal uptake.\par Hepatobiliary: Unremarkable.\par Gallbladder: Multiple gallstones.\par Spleen: No abnormal uptake\par Pancreas: No abnormal uptake.\par Adrenal glands: No abnormal uptake.\par Kidneys/ureters/bladder: Normal excretory activity is demonstrated.\par Abdominal pelvic lymph nodes: No abnormal uptake.\par Bowel/peritoneum/mesentery: No abnormal uptake.\par Pelvic organs: Unremarkable. No abnormal increased uptake. Prosthetic\par calcifications.\par Bones/soft tissues: Osteoarthritic changes are identified in the bones. \par Patient\par has multiple extensive lytic lesions in the skeletal, seen in the skeletal\par survey  for details see the report  on 2016.\par In the prior examination there was PET positive lytic lesion right fifth rib\par with a max SUV 2.6, now non-FDG avid   and L5 vertebral body on the left side\par with a max SUV 3.8, now non-FDG avid with multiple other scattered lytic\par lesions, non-FDG avid.\par No abnormal increased uptake is seen in the lower extremities.\par Impression:\par 1. No new areas of abnormal increased uptake is seen.\par 2. Previously seen abnormal increased uptake seen in the right fifth rib and \par L5\par vertebral body on the left side, at this time both regions are non-FDG avid.\par 3. Multiple extensive lytic lesions in the skeletal consistent with multiple\par myeloma.\par 4. No other areas of abnormal increased uptake is seen.\par I the attending attest that I have personally reviewed these images and agree\par with this report.\par \par  \par

## 2020-04-20 NOTE — REVIEW OF SYSTEMS
[Negative] : Allergic/Immunologic [Fever] : no fever [Chills] : no chills [Fatigue] : no fatigue [Dry Eyes] : no dryness of the eyes [Mucosal Pain] : no mucosal pain [Lower Ext Edema] : no lower extremity edema [Leg Claudication] : no intermittent leg claudication [Constipation] : no constipation [SOB on Exertion] : no shortness of breath during exertion [Abdominal Pain] : no abdominal pain [Muscle Pain] : no muscle pain

## 2020-04-20 NOTE — HISTORY OF PRESENT ILLNESS
[Therapy: ___] : Therapy: [unfilled] [de-identified] : REASON FOR FOLLOWUP:  Maintenance Velcade for lambda light chain myeloma, status post autologous stem cell transplant.\par \par TREATMENT HISTORY:  On 05/02/2015, he was diagnosed with lambda light chain multiple myeloma when he had a syncopal episode in [place] and was found to be in acute renal failure.  He underwent a bone marrow biopsy that confirmed lambda light chain multiple myeloma.  His beta2 microglobulin was initially 27.1 on diagnosis.  His lambda light chain prior to treatment was as high as 44007.2 on 02/27/2015.  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  There was also diffuse acute tubular injury and modular tubular atrophy with interstitial fibrosis initially on dialysis, but currently been off dialysis for approximately one year.\par \par His treatment summary is as follows.  He initiated Velcade, Cytoxan, and dexamethasone, cycle started on 03/13/2015.  He completed four cycles on 05/08/2015.  He then underwent an autologous bone marrow transplant in 07/2015 at [place].  He has been on maintenance Velcade but in 6/17 was started on DRD due to  rising lambda with normal kappa.\par \par Mr. Motta is a very pleasant 66yearold gentleman with history of lambda light chain multiple myeloma, treated as above.\par  [FreeTextEntry1] : DRD started  on 6/21/17 went on  Revlimid maintenance 6/18/18 [de-identified] : He presents today for followup she is currently on maintenance Velcade.his blood work from October 27 demonstrated a stage a faint lambda band on urine immunofixation. Nomi a count 7.66 hemoglobin 8.3 platelet count of 132. The SPEP was normal. Creatinine was stable at 5.93 potassium was 5.2 total protein 5.9 unremarkable LFTs free kappa lambda ratio normal 1.27 with free kappa at 42 and free lambda at 33.1. Most recent blood work from 1110 2016 shows a hemoglobin of 7.8. \par \par he was recently seen by Dr. Rod in  [place]. He had blood work done and reports that everything was stable. Dr. Rod also recommended to decrease his Velcade dose to 1.3 mg/m2\par \par He has no complaints. He denies any neuropathy.\par \par 1/4/17\par Presents for follow up today. Doing well on Maintenance Velcade.  No complaints. No neuropathy. \par \par 2/1/17\par He presents for follow up today. He has no complaints. Tolerating Velcade well.\par \par 4/5/17\par Doing well No complaints blood work from last visit was stable.\par \par 5/1/17\par He presents for follow up. He had increasing lambda light chains. Repeat was stable. A bone marrow showed minimal plasma cells and PET CT did not show any new activity.  He has no complaints.\par \par 6/5/2017\par Presents for follow up. He is doing well. He has some fatigue. His  Lambda has been slowly increasing. He has no other complaints.  No neuropathy\par \par 6/12/17\par He was brought back to discuss his lab work. His Lambda has been increasing with stable Kappa as well as slight worsening of his of creatinine He has no complaints.\par \par 7/12/17\par He has been on DRD. His HgB dropped to 5.9 and he is s/p PRBC transfusion.  He finished Revlimid on 7/9. His only complaint is leg crams at night. \par \par 8/9/17\par He is here for follow up. His cramps went away with the lower dose of Revlimid. His His lambda is coming down. He saw Dr. Rod last week as per patient.\par \par 8/30/27\par He is doing well. His legs cramps are better. He has severe anemia but he is asymptomatic. HIs creatinine is better and his light chain is at baseline.\par \par 9/27/17\par He is here for follow up. He needed  blood transfusion and hgb has been stable at 8 since than. He is also status post Venofer x2. He is on Procrit\par  40,000 units every 2 weeks. He feels well otherwise. \par \par 11/20/17\par Patient feels well and has no complaints. States he was taking Revlimid 5mg q48hrs prior to stopping for low counts. Patient will get Procrit and Darzalex. Patient will be restarted on Revilimid 2.5mg. Patient will continue every 2week Darzalex until week 25 then it becomes every 4 weeks. \par \par 12/18/17\par He is here for follow up. He is doing well. Blood work from last month still in CR. Cr and counts are better. He has no complaints and feel great.\par \par 1/22/18\par He is here for follow up. He is recovering from a cold. He is taking a prednisone taper given by his PMD. Had mules pain from cough.\par \par 2/26/18\par He is doing well. He has no complaints. His labs have been stable his light chain only went up slightly but ratio is normal. \par \par 3/27/18\par He is here for follow up he is doing well. He had blood work in [facility]  where he saw Dr. Rod 3/26/18 WBC 6.7, HgB 11.3, Pts 140, LFT WNL, , Ca 9.7. Cr. 5.96, alb 3.2\par \par He feels well. \par \par 4/23/18\par No events feels well. No complaints\par \par 5/21/18\par He is here for follow up. Doing well. Has no complaints\par \par 6/18/18\par His hgb has been dropping on monthly Procrit, His ligh chains are normal last Serum LUCIA from april 23 showed IgG kappa band thus he achieved aVGPR for DRD. He has no complaints.\par \par 7/16/18\par He is here for follow up. Doing well. No complaints. His kitten bit him.\par \par 8/13/18\par He is doing well. No complains. His HGb has been over 11 gm/dl So we are holding his procrit.\par \par 9/10/18\par He is here for follow up. He Myleoma panel is stable. Hgb is stable. He is due for his vaccinations 2 years post Auto and will have them today. He started Revlimid about 1 week ago. He has no complaints.\par \par 10/10/18\par He is here for follow up. He is doing well. BP is a bit high will watch. He has no complaints. He has his revlamid.\par \par 11/7/18\par He is here for follow up. His last hgb was under 10 gm/dl.  He is doing well otherwise. Was started on something for his blood pressure by Dr. Allen but he does not know what it is. He has his Revlimid\par \par 12/10/18\par Patient is here for a follow-up visit.  He is feeling well with no complaints.  Most recent CBC is stable.  Patient denies fever, chills, nausea, vomiting.  The light chains are picking up but it may be related to the CKD since LUCIA is negative and ratio is normal.\par \par 1/21/19\par He is here for follow-up with his wife.  His Kappa+Lambda have slightly risen, but the ratio is stable, so this is likely due to his CKD.   Most recent CBC is stable.  He received a prescription for Revlimid 5mg so he has been taking them every other day.  He is otherwise feeling well.  The rest of the CRAB criteria remains the same.  \par \par 2/25/19\par He is here with his wife.  As both his light chains are increasing, it is likely it is from his renal failure.  His most recent Hgb is 10.8g/dL, so we may stop when he nears 11.0g/dL.  He will see Dr. Rod on 3/25/19.   \par \par 3/20/19\par He is here for follow-up of MM with his wife.  His Hgb is stable at 11.3g/dL.  His BP is slightly elevated today. He will speak with his PCP.  He is otherwise feeling well.\par \par 4/22/19\par As of February, the immunofixation is picking up weak IgG Kappa bands no CRAB criteria  or significant rise in paraprotein that suggests progression .  We will continue with Revlimid at this time.   They went to see Dr. Rod from [facility] last month.   He feels well otherwise.\par \par 5/20/19\par He is here for follow up. He is doing well. His HgB is coming down so we will restart Procrit today. MM panel from March stable.\par \par 6/17/19\par He is here for follow-up of MM with his wife.  The MM panel from May was stable.  His hgb is 11.8g/dL today, so he does not need procrit.  He is feeling well. \par \par 7/15/19\par He is here for follow up. His  Myeloma panel from June is stable, Serum LUCIA remains negative.\par \par 8/12/19\par He is here for follow up. paraproteins from July are stable with negative serum LUCIA. His HgB is 10.9. he has no complaints. \par \par 9/16/19\par He is here for follow up. On day 4 of Revlimid. No complaints. HGB is stable. Serologically still in CR.\par \par 10/21/19\par he is here for follow up. No deviance of progression on last labs. he feels well.  On his off week of Revlimid. No complaints. to get his flu shot and pneumococcal vaccine with PMD next week.\par \par 11/18/19\par He is here for follow up of MM.  He is feeling well today.  He is seated with his wife.  He denies any bony pain or weight loss.  This is his off week for Revlimid.  No new complaints.  He is due to  start new cycle of Revlimid next Monday.  Hgb has improved to 11g/dL so we will hold procrit for now.  \par \par 12/23/19\par Patient is here for a follow-up visit for myeloma.  He is feeling well with no new complaints, tolerating revlimid 2.5 every other day.  This is his D2 of the next cycle of Revlimid, no refill needed at this time.  Most recent CBC is stable with hgb 11.2g/dL.  Patient denies fever, chills, nausea, vomiting, new pain or bleeding.  We will continue to hold Procrit for now.  \par \par 1/20/2020\par Patient is here for a follow-up visit for myeloma accompanied by his spouse.  He is feeling well with no new complaints, tolerating revlimid 2.5 every other day.  He has just begun the next cycle of Revlimid, no refill needed at this time.  Most recent CBC is stable with hgb 11.5g/dL.  Patient denies fever, chills, nausea, vomiting, new bony pain or bleeding.  We will continue to hold Procrit for now as his hgb is at target.  \par \par 2/24/2020\par Patient is here for a follow-up visit for myeloma accompanied by his spouse.   He is feeling well with no new complaints, tolerating revlimid 2.5 every other day.   Most recent CBC with hgb 10.8g/dL.  Myeloma panel from 01/2020 is stable with no quantifiable M-spike.  He is approaching week off of Revlimid and requesting refill.  \par \par 4/20/2020\par Patient is here for a follow-up visit for multiple myeloma.   He is feeling well with no new complaints, tolerating revlimid 2.5 every other day.   Most recent CBC with hgb 10.2g/dL.  Myeloma panel from 01/2020 remains with no quantifiable M-spike.   He has no complaints.\par \par

## 2020-04-23 LAB
ALBUMIN MFR SERPL ELPH: 59.1 %
ALBUMIN SERPL-MCNC: 3.8 G/DL
ALBUMIN/GLOB SERPL: 1.5 RATIO
ALPHA1 GLOB MFR SERPL ELPH: 5.1 %
ALPHA1 GLOB SERPL ELPH-MCNC: 0.3 G/DL
ALPHA2 GLOB MFR SERPL ELPH: 11.3 %
ALPHA2 GLOB SERPL ELPH-MCNC: 0.7 G/DL
B-GLOBULIN MFR SERPL ELPH: 11 %
B-GLOBULIN SERPL ELPH-MCNC: 0.7 G/DL
DEPRECATED KAPPA LC FREE/LAMBDA SER: 0.96 RATIO
GAMMA GLOB FLD ELPH-MCNC: 0.9 G/DL
GAMMA GLOB MFR SERPL ELPH: 13.5 %
IGA SER QL IEP: 210 MG/DL
IGG SER QL IEP: 1062 MG/DL
IGM SER QL IEP: 33 MG/DL
INTERPRETATION SERPL IEP-IMP: NORMAL
KAPPA LC CSF-MCNC: 10.57 MG/DL
KAPPA LC SERPL-MCNC: 10.14 MG/DL
M PROTEIN MFR SERPL ELPH: NORMAL
M PROTEIN SPEC IFE-MCNC: NORMAL
MONOCLON BAND OBS SERPL: NORMAL
PROT SERPL-MCNC: 6.4 G/DL
PROT SERPL-MCNC: 6.4 G/DL

## 2020-05-18 ENCOUNTER — LABORATORY RESULT (OUTPATIENT)
Age: 70
End: 2020-05-18

## 2020-05-18 ENCOUNTER — APPOINTMENT (OUTPATIENT)
Dept: HEMATOLOGY ONCOLOGY | Facility: CLINIC | Age: 70
End: 2020-05-18
Payer: MEDICARE

## 2020-05-18 VITALS
BODY MASS INDEX: 26.03 KG/M2 | RESPIRATION RATE: 14 BRPM | DIASTOLIC BLOOD PRESSURE: 70 MMHG | HEIGHT: 66 IN | HEIGHT: 66 IN | SYSTOLIC BLOOD PRESSURE: 148 MMHG | TEMPERATURE: 97.2 F | HEART RATE: 80 BPM | WEIGHT: 162 LBS

## 2020-05-18 LAB
ALBUMIN SERPL ELPH-MCNC: 4.1 G/DL
ALP BLD-CCNC: 59 U/L
ALT SERPL-CCNC: 7 U/L
ANION GAP SERPL CALC-SCNC: 17 MMOL/L
AST SERPL-CCNC: 10 U/L
BILIRUB SERPL-MCNC: <0.2 MG/DL
BUN SERPL-MCNC: 66 MG/DL
CALCIUM SERPL-MCNC: 9.8 MG/DL
CHLORIDE SERPL-SCNC: 106 MMOL/L
CO2 SERPL-SCNC: 16 MMOL/L
CREAT SERPL-MCNC: 6 MG/DL
GLUCOSE SERPL-MCNC: 109 MG/DL
HCT VFR BLD CALC: 33.7 %
HGB BLD-MCNC: 10.4 G/DL
MCHC RBC-ENTMCNC: 25 PG
MCHC RBC-ENTMCNC: 30.9 G/DL
MCV RBC AUTO: 81 FL
PLATELET # BLD AUTO: 174 K/UL
PMV BLD: 10 FL
POTASSIUM SERPL-SCNC: 4.7 MMOL/L
PROT SERPL-MCNC: 6.5 G/DL
RBC # BLD: 4.16 M/UL
RBC # FLD: 18 %
SODIUM SERPL-SCNC: 139 MMOL/L
WBC # FLD AUTO: 7.79 K/UL

## 2020-05-18 PROCEDURE — 99213 OFFICE O/P EST LOW 20 MIN: CPT

## 2020-05-18 NOTE — ASSESSMENT
[FreeTextEntry1] : #Free light chain multiple myeloma, specifically lambda.  Initial beta-2 was 27.1.  His bone marrow cytogenetics was normal.  He is status post four cycles of VCD.  This is followed by autologous bone marrow transplant in 07/2015,he has been on  weekly Velcade maintenance, He is also being followed by Dr. Krysta Rod in [place].  He currently takes his acyclovir.  Last myeloma panel  showed increasing lambda light chains but marrow showed minimal plasma cells with PET CT no progression.  HIs lambda continues to increase supporting biochemical recurrence with worsening creatinine  He was started on Daratumumab, Revlimid 5 mg daily d1-21 and  Dexamethasone  40 mg weekly PO with great response and is nos most likey in CR. Patient was taking Revlimid every other day and it was held for pancytopenia.  On 6/18/18 went on Revlimid maintenance . Achieved a VGPR/CR. Since 2/2019 Serum LUCIA is now positive thus Relapse from complete response,  but  no evidence of clinical relapse thus would keep going with Revlimid. If affected light chain begins to rise constantly  will check PET/CT possible repeat marrow and will consider change in therapy, multiple options such as restating Esthela and going to pomalidomide with Dex or Pom Dex and Slam7 inhibition or Esthela, carfilzomib and dex or  clinical trial if available  \par - c/w Revlimid 2.5 mg every other day d1-21 every 28 days (takes 10 tabs) \par - Myeloma panel resent, has been stable.\par - refuses colonoscopy \par \par #Chronic kidney disease from multiple myeloma.  He has  been on dialysis in the past but is now off.  \par - creatinine has been relatively stable.  He follows up with Dr. Allen at least twice a year.\par \par #Normocytic anemia, related to chronic kidney disease and Revlimid was on   Procrit  60,000 units every  other  week and s/p IV iron .  On hold again since Hgb >10 g/dL\par - will keep saturation over 20% and Ferritin over 100 if goes back on Procrit  \par \par 4. Moderate to severe   thrombocytopenia most likely from Revlimid. \par - Resolved\par \par 5. HTN \par - on meds, will follow-up with his PCP for adjustment, stable today but systolic is still a bit high\par \par RTC in 2 months , will call with blood work results if any changes, if does not feel well can come back in 1 month

## 2020-05-18 NOTE — RESULTS/DATA
[FreeTextEntry1] :  Result Name Results Units Reference Range \par      PET CT WHOLE BODY TOP OF SKULL TO TI   SEE TEXT       \par     Patient Name: MOR, AUGUST : 1950\par Patient ID: [id number]\par Account: [id number]\par Patient Location: [facility]\par Accession: [id number]\par Procedure: PET CT WHOLE BODY TOP OF SKULL TO TIP OF TOES SUBSEQUENT [id number]\par Date of Exam: 2017 11:09 AM\par Attending Physician: JARVIS LOW\par Requesting Physician: JARVIS LOW MD\par Clinical History / Reason for exam: FDG PET CT STUDY:\par Reason for examination: Tumor imaging - PET with concurrently acquired CT for\par attenuation correction and anatomic localization;  top of the skull  to toes/\par [address] multiple myeloma, subsequent treatment strategy.\par ICD-10 code:C90.0\par History: 66-year-old male patient with history of multiple myeloma, last\par chemotherapy received was in 2017. Patient had bone biopsy on\par 2017.\par Blood glucose pre injection 102 mg/dL\par Technique:\par Approximately 45 minutes after the intravenous administration of 13.4  mCi\par 18-Fluorine FDG, whole body PET images were acquired from top of  the skull to\par toes. In addition, non-iv and non oral contrast, low dose, non - diagnostic CT\par was acquired for attenuation correction and anatomic correlation only.\par Findings:\par Comparison: Prior PET CT study done on July 3, 2015. Correlation was performed\par with the skeletal survey done on 2016.\par Head /Neck: Physiologic FDG uptake is seen in the visualized region of the\par brain, pharyngeal lymphoid tissue, and salivary glands.\par Chest:\par Physiologic FDG avid PET is seen in the mediastinal blood pool and myocardium.\par Chest wall: Unremarkable\par Lungs: No abnormal uptake.\par Mediastinum/Pleura/pericardium: No abnormal uptake.\par Thoracic/ axillary lymph nodes: No abnormal uptake.\par Hepatobiliary: Unremarkable.\par Gallbladder: Multiple gallstones.\par Spleen: No abnormal uptake\par Pancreas: No abnormal uptake.\par Adrenal glands: No abnormal uptake.\par Kidneys/ureters/bladder: Normal excretory activity is demonstrated.\par Abdominal pelvic lymph nodes: No abnormal uptake.\par Bowel/peritoneum/mesentery: No abnormal uptake.\par Pelvic organs: Unremarkable. No abnormal increased uptake. Prosthetic\par calcifications.\par Bones/soft tissues: Osteoarthritic changes are identified in the bones. \par Patient\par has multiple extensive lytic lesions in the skeletal, seen in the skeletal\par survey  for details see the report  on 2016.\par In the prior examination there was PET positive lytic lesion right fifth rib\par with a max SUV 2.6, now non-FDG avid   and L5 vertebral body on the left side\par with a max SUV 3.8, now non-FDG avid with multiple other scattered lytic\par lesions, non-FDG avid.\par No abnormal increased uptake is seen in the lower extremities.\par Impression:\par 1. No new areas of abnormal increased uptake is seen.\par 2. Previously seen abnormal increased uptake seen in the right fifth rib and \par L5\par vertebral body on the left side, at this time both regions are non-FDG avid.\par 3. Multiple extensive lytic lesions in the skeletal consistent with multiple\par myeloma.\par 4. No other areas of abnormal increased uptake is seen.\par I the attending attest that I have personally reviewed these images and agree\par with this report.\par \par  \par

## 2020-05-18 NOTE — HISTORY OF PRESENT ILLNESS
[de-identified] : REASON FOR FOLLOWUP:  Maintenance Velcade for lambda light chain myeloma, status post autologous stem cell transplant.\par \par TREATMENT HISTORY:  On 05/02/2015, he was diagnosed with lambda light chain multiple myeloma when he had a syncopal episode in [place] and was found to be in acute renal failure.  He underwent a bone marrow biopsy that confirmed lambda light chain multiple myeloma.  His beta2 microglobulin was initially 27.1 on diagnosis.  His lambda light chain prior to treatment was as high as 25332.2 on 02/27/2015.  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  There was also diffuse acute tubular injury and modular tubular atrophy with interstitial fibrosis initially on dialysis, but currently been off dialysis for approximately one year.\par \par His treatment summary is as follows.  He initiated Velcade, Cytoxan, and dexamethasone, cycle started on 03/13/2015.  He completed four cycles on 05/08/2015.  He then underwent an autologous bone marrow transplant in 07/2015 at [place].  He has been on maintenance Velcade but in 6/17 was started on DRD due to  rising lambda with normal kappa.\par \par Mr. Motta is a very pleasant 66yearold gentleman with history of lambda light chain multiple myeloma, treated as above.\par  [Therapy: ___] : Therapy: [unfilled] [FreeTextEntry1] : DRD started  on 6/21/17 went on  Revlimid maintenance 6/18/18 [de-identified] : He presents today for followup she is currently on maintenance Velcade.his blood work from October 27 demonstrated a stage a faint lambda band on urine immunofixation. Nomi a count 7.66 hemoglobin 8.3 platelet count of 132. The SPEP was normal. Creatinine was stable at 5.93 potassium was 5.2 total protein 5.9 unremarkable LFTs free kappa lambda ratio normal 1.27 with free kappa at 42 and free lambda at 33.1. Most recent blood work from 1110 2016 shows a hemoglobin of 7.8. \par \par he was recently seen by Dr. Rod in  [place]. He had blood work done and reports that everything was stable. Dr. Rod also recommended to decrease his Velcade dose to 1.3 mg/m2\par \par He has no complaints. He denies any neuropathy.\par \par 1/4/17\par Presents for follow up today. Doing well on Maintenance Velcade.  No complaints. No neuropathy. \par \par 2/1/17\par He presents for follow up today. He has no complaints. Tolerating Velcade well.\par \par 4/5/17\par Doing well No complaints blood work from last visit was stable.\par \par 5/1/17\par He presents for follow up. He had increasing lambda light chains. Repeat was stable. A bone marrow showed minimal plasma cells and PET CT did not show any new activity.  He has no complaints.\par \par 6/5/2017\par Presents for follow up. He is doing well. He has some fatigue. His  Lambda has been slowly increasing. He has no other complaints.  No neuropathy\par \par 6/12/17\par He was brought back to discuss his lab work. His Lambda has been increasing with stable Kappa as well as slight worsening of his of creatinine He has no complaints.\par \par 7/12/17\par He has been on DRD. His HgB dropped to 5.9 and he is s/p PRBC transfusion.  He finished Revlimid on 7/9. His only complaint is leg crams at night. \par \par 8/9/17\par He is here for follow up. His cramps went away with the lower dose of Revlimid. His His lambda is coming down. He saw Dr. Rod last week as per patient.\par \par 8/30/27\par He is doing well. His legs cramps are better. He has severe anemia but he is asymptomatic. HIs creatinine is better and his light chain is at baseline.\par \par 9/27/17\par He is here for follow up. He needed  blood transfusion and hgb has been stable at 8 since than. He is also status post Venofer x2. He is on Procrit\par  40,000 units every 2 weeks. He feels well otherwise. \par \par 11/20/17\par Patient feels well and has no complaints. States he was taking Revlimid 5mg q48hrs prior to stopping for low counts. Patient will get Procrit and Darzalex. Patient will be restarted on Revilimid 2.5mg. Patient will continue every 2week Darzalex until week 25 then it becomes every 4 weeks. \par \par 12/18/17\par He is here for follow up. He is doing well. Blood work from last month still in CR. Cr and counts are better. He has no complaints and feel great.\par \par 1/22/18\par He is here for follow up. He is recovering from a cold. He is taking a prednisone taper given by his PMD. Had mules pain from cough.\par \par 2/26/18\par He is doing well. He has no complaints. His labs have been stable his light chain only went up slightly but ratio is normal. \par \par 3/27/18\par He is here for follow up he is doing well. He had blood work in [facility]  where he saw Dr. Rod 3/26/18 WBC 6.7, HgB 11.3, Pts 140, LFT WNL, , Ca 9.7. Cr. 5.96, alb 3.2\par \par He feels well. \par \par 4/23/18\par No events feels well. No complaints\par \par 5/21/18\par He is here for follow up. Doing well. Has no complaints\par \par 6/18/18\par His hgb has been dropping on monthly Procrit, His ligh chains are normal last Serum LUCIA from april 23 showed IgG kappa band thus he achieved aVGPR for DRD. He has no complaints.\par \par 7/16/18\par He is here for follow up. Doing well. No complaints. His kitten bit him.\par \par 8/13/18\par He is doing well. No complains. His HGb has been over 11 gm/dl So we are holding his procrit.\par \par 9/10/18\par He is here for follow up. He Myleoma panel is stable. Hgb is stable. He is due for his vaccinations 2 years post Auto and will have them today. He started Revlimid about 1 week ago. He has no complaints.\par \par 10/10/18\par He is here for follow up. He is doing well. BP is a bit high will watch. He has no complaints. He has his revlamid.\par \par 11/7/18\par He is here for follow up. His last hgb was under 10 gm/dl.  He is doing well otherwise. Was started on something for his blood pressure by Dr. Allen but he does not know what it is. He has his Revlimid\par \par 12/10/18\par Patient is here for a follow-up visit.  He is feeling well with no complaints.  Most recent CBC is stable.  Patient denies fever, chills, nausea, vomiting.  The light chains are picking up but it may be related to the CKD since LUCIA is negative and ratio is normal.\par \par 1/21/19\par He is here for follow-up with his wife.  His Kappa+Lambda have slightly risen, but the ratio is stable, so this is likely due to his CKD.   Most recent CBC is stable.  He received a prescription for Revlimid 5mg so he has been taking them every other day.  He is otherwise feeling well.  The rest of the CRAB criteria remains the same.  \par \par 2/25/19\par He is here with his wife.  As both his light chains are increasing, it is likely it is from his renal failure.  His most recent Hgb is 10.8g/dL, so we may stop when he nears 11.0g/dL.  He will see Dr. Rod on 3/25/19.   \par \par 3/20/19\par He is here for follow-up of MM with his wife.  His Hgb is stable at 11.3g/dL.  His BP is slightly elevated today. He will speak with his PCP.  He is otherwise feeling well.\par \par 4/22/19\par As of February, the immunofixation is picking up weak IgG Kappa bands no CRAB criteria  or significant rise in paraprotein that suggests progression .  We will continue with Revlimid at this time.   They went to see Dr. Rod from [facility] last month.   He feels well otherwise.\par \par 5/20/19\par He is here for follow up. He is doing well. His HgB is coming down so we will restart Procrit today. MM panel from March stable.\par \par 6/17/19\par He is here for follow-up of MM with his wife.  The MM panel from May was stable.  His hgb is 11.8g/dL today, so he does not need procrit.  He is feeling well. \par \par 7/15/19\par He is here for follow up. His  Myeloma panel from June is stable, Serum LUCIA remains negative.\par \par 8/12/19\par He is here for follow up. paraproteins from July are stable with negative serum LUCIA. His HgB is 10.9. he has no complaints. \par \par 9/16/19\par He is here for follow up. On day 4 of Revlimid. No complaints. HGB is stable. Serologically still in CR.\par \par 10/21/19\par he is here for follow up. No deviance of progression on last labs. he feels well.  On his off week of Revlimid. No complaints. to get his flu shot and pneumococcal vaccine with PMD next week.\par \par 11/18/19\par He is here for follow up of MM.  He is feeling well today.  He is seated with his wife.  He denies any bony pain or weight loss.  This is his off week for Revlimid.  No new complaints.  He is due to  start new cycle of Revlimid next Monday.  Hgb has improved to 11g/dL so we will hold procrit for now.  \par \par 12/23/19\par Patient is here for a follow-up visit for myeloma.  He is feeling well with no new complaints, tolerating revlimid 2.5 every other day.  This is his D2 of the next cycle of Revlimid, no refill needed at this time.  Most recent CBC is stable with hgb 11.2g/dL.  Patient denies fever, chills, nausea, vomiting, new pain or bleeding.  We will continue to hold Procrit for now.  \par \par 1/20/2020\par Patient is here for a follow-up visit for myeloma accompanied by his spouse.  He is feeling well with no new complaints, tolerating revlimid 2.5 every other day.  He has just begun the next cycle of Revlimid, no refill needed at this time.  Most recent CBC is stable with hgb 11.5g/dL.  Patient denies fever, chills, nausea, vomiting, new bony pain or bleeding.  We will continue to hold Procrit for now as his hgb is at target.  \par \par 2/24/2020\par Patient is here for a follow-up visit for myeloma accompanied by his spouse.   He is feeling well with no new complaints, tolerating revlimid 2.5 every other day.   Most recent CBC with hgb 10.8g/dL.  Myeloma panel from 01/2020 is stable with no quantifiable M-spike.  He is approaching week off of Revlimid and requesting refill.  \par \par 4/20/2020\par Patient is here for a follow-up visit for multiple myeloma.   He is feeling well with no new complaints, tolerating revlimid 2.5 every other day.   Most recent CBC with hgb 10.2g/dL.  Myeloma panel from 01/2020 remains with no quantifiable M-spike.   He has no complaints.\par \par \par 5/18/20\par He is here for follow up. he feels well. On revlamid. His last myeloma panel is without progression. His hgb is over 10 today.\par

## 2020-05-18 NOTE — REVIEW OF SYSTEMS
[Fever] : no fever [Chills] : no chills [Fatigue] : no fatigue [Mucosal Pain] : no mucosal pain [Dry Eyes] : no dryness of the eyes [Leg Claudication] : no intermittent leg claudication [Lower Ext Edema] : no lower extremity edema [SOB on Exertion] : no shortness of breath during exertion [Abdominal Pain] : no abdominal pain [Constipation] : no constipation [Muscle Pain] : no muscle pain [Negative] : Allergic/Immunologic

## 2020-05-22 LAB
ALBUMIN MFR SERPL ELPH: 58.4 %
ALBUMIN SERPL-MCNC: 3.7 G/DL
ALBUMIN/GLOB SERPL: 1.4 RATIO
ALPHA1 GLOB MFR SERPL ELPH: 5.4 %
ALPHA1 GLOB SERPL ELPH-MCNC: 0.3 G/DL
ALPHA2 GLOB MFR SERPL ELPH: 11.2 %
ALPHA2 GLOB SERPL ELPH-MCNC: 0.7 G/DL
B-GLOBULIN MFR SERPL ELPH: 11 %
B-GLOBULIN SERPL ELPH-MCNC: 0.7 G/DL
DEPRECATED KAPPA LC FREE/LAMBDA SER: 0.84 RATIO
GAMMA GLOB FLD ELPH-MCNC: 0.9 G/DL
GAMMA GLOB MFR SERPL ELPH: 14 %
IGA SER QL IEP: 209 MG/DL
IGG SER QL IEP: 956 MG/DL
IGM SER QL IEP: 35 MG/DL
INTERPRETATION SERPL IEP-IMP: NORMAL
KAPPA LC CSF-MCNC: 12 MG/DL
KAPPA LC SERPL-MCNC: 10.02 MG/DL
M PROTEIN SPEC IFE-MCNC: NORMAL
PROT SERPL-MCNC: 6.3 G/DL
PROT SERPL-MCNC: 6.3 G/DL

## 2020-07-20 ENCOUNTER — LABORATORY RESULT (OUTPATIENT)
Age: 70
End: 2020-07-20

## 2020-07-20 ENCOUNTER — APPOINTMENT (OUTPATIENT)
Dept: HEMATOLOGY ONCOLOGY | Facility: CLINIC | Age: 70
End: 2020-07-20
Payer: MEDICARE

## 2020-07-20 VITALS
HEIGHT: 60 IN | TEMPERATURE: 97.1 F | SYSTOLIC BLOOD PRESSURE: 156 MMHG | BODY MASS INDEX: 30.82 KG/M2 | DIASTOLIC BLOOD PRESSURE: 81 MMHG | HEART RATE: 74 BPM | WEIGHT: 157 LBS | RESPIRATION RATE: 14 BRPM

## 2020-07-20 LAB
ALBUMIN SERPL ELPH-MCNC: 4.1 G/DL
ALP BLD-CCNC: 59 U/L
ALT SERPL-CCNC: 9 U/L
ANION GAP SERPL CALC-SCNC: 19 MMOL/L
AST SERPL-CCNC: 9 U/L
BILIRUB SERPL-MCNC: <0.2 MG/DL
BUN SERPL-MCNC: 63 MG/DL
CALCIUM SERPL-MCNC: 10.3 MG/DL
CHLORIDE SERPL-SCNC: 104 MMOL/L
CO2 SERPL-SCNC: 15 MMOL/L
CREAT SERPL-MCNC: 6.1 MG/DL
GLUCOSE SERPL-MCNC: 107 MG/DL
HCT VFR BLD CALC: 34 %
HGB BLD-MCNC: 10.7 G/DL
MCHC RBC-ENTMCNC: 24.9 PG
MCHC RBC-ENTMCNC: 31.5 G/DL
MCV RBC AUTO: 79.3 FL
PLATELET # BLD AUTO: 170 K/UL
PMV BLD: 8.7 FL
POTASSIUM SERPL-SCNC: 3.8 MMOL/L
PROT SERPL-MCNC: 6.6 G/DL
RBC # BLD: 4.29 M/UL
RBC # FLD: 17.5 %
SODIUM SERPL-SCNC: 138 MMOL/L
WBC # FLD AUTO: 7.29 K/UL

## 2020-07-20 PROCEDURE — 99213 OFFICE O/P EST LOW 20 MIN: CPT

## 2020-07-20 NOTE — HISTORY OF PRESENT ILLNESS
[Therapy: ___] : Therapy: [unfilled] [FreeTextEntry1] : DRD started  on 6/21/17 went on  Revlimid maintenance 6/18/18 [de-identified] : He presents today for followup she is currently on maintenance Velcade.his blood work from October 27 demonstrated a stage a faint lambda band on urine immunofixation. Nomi a count 7.66 hemoglobin 8.3 platelet count of 132. The SPEP was normal. Creatinine was stable at 5.93 potassium was 5.2 total protein 5.9 unremarkable LFTs free kappa lambda ratio normal 1.27 with free kappa at 42 and free lambda at 33.1. Most recent blood work from 1110 2016 shows a hemoglobin of 7.8. \par \par he was recently seen by Dr. Rod in  [place]. He had blood work done and reports that everything was stable. Dr. Rod also recommended to decrease his Velcade dose to 1.3 mg/m2\par \par He has no complaints. He denies any neuropathy.\par \par 1/4/17\par Presents for follow up today. Doing well on Maintenance Velcade.  No complaints. No neuropathy. \par \par 2/1/17\par He presents for follow up today. He has no complaints. Tolerating Velcade well.\par \par 4/5/17\par Doing well No complaints blood work from last visit was stable.\par \par 5/1/17\par He presents for follow up. He had increasing lambda light chains. Repeat was stable. A bone marrow showed minimal plasma cells and PET CT did not show any new activity.  He has no complaints.\par \par 6/5/2017\par Presents for follow up. He is doing well. He has some fatigue. His  Lambda has been slowly increasing. He has no other complaints.  No neuropathy\par \par 6/12/17\par He was brought back to discuss his lab work. His Lambda has been increasing with stable Kappa as well as slight worsening of his of creatinine He has no complaints.\par \par 7/12/17\par He has been on DRD. His HgB dropped to 5.9 and he is s/p PRBC transfusion.  He finished Revlimid on 7/9. His only complaint is leg crams at night. \par \par 8/9/17\par He is here for follow up. His cramps went away with the lower dose of Revlimid. His His lambda is coming down. He saw Dr. Rod last week as per patient.\par \par 8/30/27\par He is doing well. His legs cramps are better. He has severe anemia but he is asymptomatic. HIs creatinine is better and his light chain is at baseline.\par \par 9/27/17\par He is here for follow up. He needed  blood transfusion and hgb has been stable at 8 since than. He is also status post Venofer x2. He is on Procrit\par  40,000 units every 2 weeks. He feels well otherwise. \par \par 11/20/17\par Patient feels well and has no complaints. States he was taking Revlimid 5mg q48hrs prior to stopping for low counts. Patient will get Procrit and Darzalex. Patient will be restarted on Revilimid 2.5mg. Patient will continue every 2week Darzalex until week 25 then it becomes every 4 weeks. \par \par 12/18/17\par He is here for follow up. He is doing well. Blood work from last month still in CR. Cr and counts are better. He has no complaints and feel great.\par \par 1/22/18\par He is here for follow up. He is recovering from a cold. He is taking a prednisone taper given by his PMD. Had mules pain from cough.\par \par 2/26/18\par He is doing well. He has no complaints. His labs have been stable his light chain only went up slightly but ratio is normal. \par \par 3/27/18\par He is here for follow up he is doing well. He had blood work in [facility]  where he saw Dr. Rod 3/26/18 WBC 6.7, HgB 11.3, Pts 140, LFT WNL, , Ca 9.7. Cr. 5.96, alb 3.2\par \par He feels well. \par \par 4/23/18\par No events feels well. No complaints\par \par 5/21/18\par He is here for follow up. Doing well. Has no complaints\par \par 6/18/18\par His hgb has been dropping on monthly Procrit, His ligh chains are normal last Serum LUCIA from april 23 showed IgG kappa band thus he achieved aVGPR for DRD. He has no complaints.\par \par 7/16/18\par He is here for follow up. Doing well. No complaints. His kitten bit him.\par \par 8/13/18\par He is doing well. No complains. His HGb has been over 11 gm/dl So we are holding his procrit.\par \par 9/10/18\par He is here for follow up. He Myleoma panel is stable. Hgb is stable. He is due for his vaccinations 2 years post Auto and will have them today. He started Revlimid about 1 week ago. He has no complaints.\par \par 10/10/18\par He is here for follow up. He is doing well. BP is a bit high will watch. He has no complaints. He has his revlamid.\par \par 11/7/18\par He is here for follow up. His last hgb was under 10 gm/dl.  He is doing well otherwise. Was started on something for his blood pressure by Dr. Allen but he does not know what it is. He has his Revlimid\par \par 12/10/18\par Patient is here for a follow-up visit.  He is feeling well with no complaints.  Most recent CBC is stable.  Patient denies fever, chills, nausea, vomiting.  The light chains are picking up but it may be related to the CKD since LUCIA is negative and ratio is normal.\par \par 1/21/19\par He is here for follow-up with his wife.  His Kappa+Lambda have slightly risen, but the ratio is stable, so this is likely due to his CKD.   Most recent CBC is stable.  He received a prescription for Revlimid 5mg so he has been taking them every other day.  He is otherwise feeling well.  The rest of the CRAB criteria remains the same.  \par \par 2/25/19\par He is here with his wife.  As both his light chains are increasing, it is likely it is from his renal failure.  His most recent Hgb is 10.8g/dL, so we may stop when he nears 11.0g/dL.  He will see Dr. Rod on 3/25/19.   \par \par 3/20/19\par He is here for follow-up of MM with his wife.  His Hgb is stable at 11.3g/dL.  His BP is slightly elevated today. He will speak with his PCP.  He is otherwise feeling well.\par \par 4/22/19\par As of February, the immunofixation is picking up weak IgG Kappa bands no CRAB criteria  or significant rise in paraprotein that suggests progression .  We will continue with Revlimid at this time.   They went to see Dr. Rod from [facility] last month.   He feels well otherwise.\par \par 5/20/19\par He is here for follow up. He is doing well. His HgB is coming down so we will restart Procrit today. MM panel from March stable.\par \par 6/17/19\par He is here for follow-up of MM with his wife.  The MM panel from May was stable.  His hgb is 11.8g/dL today, so he does not need procrit.  He is feeling well. \par \par 7/15/19\par He is here for follow up. His  Myeloma panel from June is stable, Serum LUCIA remains negative.\par \par 8/12/19\par He is here for follow up. paraproteins from July are stable with negative serum LUCIA. His HgB is 10.9. he has no complaints. \par \par 9/16/19\par He is here for follow up. On day 4 of Revlimid. No complaints. HGB is stable. Serologically still in CR.\par \par 10/21/19\par he is here for follow up. No deviance of progression on last labs. he feels well.  On his off week of Revlimid. No complaints. to get his flu shot and pneumococcal vaccine with PMD next week.\par \par 11/18/19\par He is here for follow up of MM.  He is feeling well today.  He is seated with his wife.  He denies any bony pain or weight loss.  This is his off week for Revlimid.  No new complaints.  He is due to  start new cycle of Revlimid next Monday.  Hgb has improved to 11g/dL so we will hold procrit for now.  \par \par 12/23/19\par Patient is here for a follow-up visit for myeloma.  He is feeling well with no new complaints, tolerating revlimid 2.5 every other day.  This is his D2 of the next cycle of Revlimid, no refill needed at this time.  Most recent CBC is stable with hgb 11.2g/dL.  Patient denies fever, chills, nausea, vomiting, new pain or bleeding.  We will continue to hold Procrit for now.  \par \par 1/20/2020\par Patient is here for a follow-up visit for myeloma accompanied by his spouse.  He is feeling well with no new complaints, tolerating revlimid 2.5 every other day.  He has just begun the next cycle of Revlimid, no refill needed at this time.  Most recent CBC is stable with hgb 11.5g/dL.  Patient denies fever, chills, nausea, vomiting, new bony pain or bleeding.  We will continue to hold Procrit for now as his hgb is at target.  \par \par 2/24/2020\par Patient is here for a follow-up visit for myeloma accompanied by his spouse.   He is feeling well with no new complaints, tolerating revlimid 2.5 every other day.   Most recent CBC with hgb 10.8g/dL.  Myeloma panel from 01/2020 is stable with no quantifiable M-spike.  He is approaching week off of Revlimid and requesting refill.  \par \par 4/20/2020\par Patient is here for a follow-up visit for multiple myeloma.   He is feeling well with no new complaints, tolerating revlimid 2.5 every other day.   Most recent CBC with hgb 10.2g/dL.  Myeloma panel from 01/2020 remains with no quantifiable M-spike.   He has no complaints.\par \par \par 5/18/20\par He is here for follow up. he feels well. On revlamid. His last myeloma panel is without progression. His hgb is over 10 today.\par \par 7/20/20\par He is doing well he has no complaints. CBC showed stable hgb at 10.7.  [de-identified] : REASON FOR FOLLOWUP:  Maintenance Velcade for lambda light chain myeloma, status post autologous stem cell transplant.\par \par TREATMENT HISTORY:  On 05/02/2015, he was diagnosed with lambda light chain multiple myeloma when he had a syncopal episode in [place] and was found to be in acute renal failure.  He underwent a bone marrow biopsy that confirmed lambda light chain multiple myeloma.  His beta2 microglobulin was initially 27.1 on diagnosis.  His lambda light chain prior to treatment was as high as 30108.2 on 02/27/2015.  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  There was also diffuse acute tubular injury and modular tubular atrophy with interstitial fibrosis initially on dialysis, but currently been off dialysis for approximately one year.\par \par His treatment summary is as follows.  He initiated Velcade, Cytoxan, and dexamethasone, cycle started on 03/13/2015.  He completed four cycles on 05/08/2015.  He then underwent an autologous bone marrow transplant in 07/2015 at [place].  He has been on maintenance Velcade but in 6/17 was started on DRD due to  rising lambda with normal kappa.\par \par Mr. Motta is a very pleasant 66yearold gentleman with history of lambda light chain multiple myeloma, treated as above.\par

## 2020-07-20 NOTE — RESULTS/DATA
[FreeTextEntry1] :  Result Name Results Units Reference Range \par      PET CT WHOLE BODY TOP OF SKULL TO TI   SEE TEXT       \par     Patient Name: MOR, AUGUST : 1950\par Patient ID: [id number]\par Account: [id number]\par Patient Location: [facility]\par Accession: [id number]\par Procedure: PET CT WHOLE BODY TOP OF SKULL TO TIP OF TOES SUBSEQUENT [id number]\par Date of Exam: 2017 11:09 AM\par Attending Physician: JARVIS LOW\par Requesting Physician: JARVIS LOW MD\par Clinical History / Reason for exam: FDG PET CT STUDY:\par Reason for examination: Tumor imaging - PET with concurrently acquired CT for\par attenuation correction and anatomic localization;  top of the skull  to toes/\par [address] multiple myeloma, subsequent treatment strategy.\par ICD-10 code:C90.0\par History: 66-year-old male patient with history of multiple myeloma, last\par chemotherapy received was in 2017. Patient had bone biopsy on\par 2017.\par Blood glucose pre injection 102 mg/dL\par Technique:\par Approximately 45 minutes after the intravenous administration of 13.4  mCi\par 18-Fluorine FDG, whole body PET images were acquired from top of  the skull to\par toes. In addition, non-iv and non oral contrast, low dose, non - diagnostic CT\par was acquired for attenuation correction and anatomic correlation only.\par Findings:\par Comparison: Prior PET CT study done on July 3, 2015. Correlation was performed\par with the skeletal survey done on 2016.\par Head /Neck: Physiologic FDG uptake is seen in the visualized region of the\par brain, pharyngeal lymphoid tissue, and salivary glands.\par Chest:\par Physiologic FDG avid PET is seen in the mediastinal blood pool and myocardium.\par Chest wall: Unremarkable\par Lungs: No abnormal uptake.\par Mediastinum/Pleura/pericardium: No abnormal uptake.\par Thoracic/ axillary lymph nodes: No abnormal uptake.\par Hepatobiliary: Unremarkable.\par Gallbladder: Multiple gallstones.\par Spleen: No abnormal uptake\par Pancreas: No abnormal uptake.\par Adrenal glands: No abnormal uptake.\par Kidneys/ureters/bladder: Normal excretory activity is demonstrated.\par Abdominal pelvic lymph nodes: No abnormal uptake.\par Bowel/peritoneum/mesentery: No abnormal uptake.\par Pelvic organs: Unremarkable. No abnormal increased uptake. Prosthetic\par calcifications.\par Bones/soft tissues: Osteoarthritic changes are identified in the bones. \par Patient\par has multiple extensive lytic lesions in the skeletal, seen in the skeletal\par survey  for details see the report  on 2016.\par In the prior examination there was PET positive lytic lesion right fifth rib\par with a max SUV 2.6, now non-FDG avid   and L5 vertebral body on the left side\par with a max SUV 3.8, now non-FDG avid with multiple other scattered lytic\par lesions, non-FDG avid.\par No abnormal increased uptake is seen in the lower extremities.\par Impression:\par 1. No new areas of abnormal increased uptake is seen.\par 2. Previously seen abnormal increased uptake seen in the right fifth rib and \par L5\par vertebral body on the left side, at this time both regions are non-FDG avid.\par 3. Multiple extensive lytic lesions in the skeletal consistent with multiple\par myeloma.\par 4. No other areas of abnormal increased uptake is seen.\par I the attending attest that I have personally reviewed these images and agree\par with this report.\par \par  \par

## 2020-07-20 NOTE — ASSESSMENT
[FreeTextEntry1] : #Free light chain multiple myeloma, specifically lambda.  Initial beta-2 was 27.1.  His bone marrow cytogenetics was normal.  He is status post four cycles of VCD.  This is followed by autologous bone marrow transplant in 07/2015,he has been on  weekly Velcade maintenance, He is also being followed by Dr. Krysta Rod in [place].  He currently takes his acyclovir.  Last myeloma panel  showed increasing lambda light chains but marrow showed minimal plasma cells with PET CT no progression.  HIs lambda continues to increase supporting biochemical recurrence with worsening creatinine  He was started on Daratumumab, Revlimid 5 mg daily d1-21 and  Dexamethasone  40 mg weekly PO with great response and is nos most likey in CR. Patient was taking Revlimid every other day and it was held for pancytopenia.  On 6/18/18 went on Revlimid maintenance . Achieved a VGPR/CR. Since 2/2019 Serum LUCIA is now positive thus Relapse from complete response,  but  no evidence of clinical relapse thus would keep going with Revlimid. If affected light chain begins to rise constantly  will check PET/CT possible repeat marrow and will consider change in therapy, multiple options such as restating Esthela and going to pomalidomide with Dex or Pom Dex and Slam7 inhibition or Esthela, carfilzomib and dex or  clinical trial if available  \par - c/w Revlimid 2.5 mg every other day d1-21 every 28 days (takes 10 tabs) \par - Myeloma panel resent, has been stable.\par - refuses colonoscopy \par \par #Chronic kidney disease from multiple myeloma.  He has  been on dialysis in the past but is now off.  \par - creatinine has been relatively stable.  He follows up with Dr. Allen at least twice a year.\par \par #Normocytic anemia, related to chronic kidney disease and Revlimid was on   Procrit  60,000 units every  other  week and s/p IV iron .  On hold again since Hgb >10 g/dL\par - will keep saturation over 20% and Ferritin over 100 if goes back on Procrit  \par \par 4. Moderate to severe   thrombocytopenia most likely from Revlimid. \par - Resolved\par \par 5. HTN \par - on meds, will follow-up with his PCP for adjustment, stable today but systolic is still a bit high\par \par RTC in  1 month.

## 2020-07-20 NOTE — REVIEW OF SYSTEMS
[Negative] : Allergic/Immunologic [Chills] : no chills [Fever] : no fever [Fatigue] : no fatigue [Mucosal Pain] : no mucosal pain [Leg Claudication] : no intermittent leg claudication [Dry Eyes] : no dryness of the eyes [Lower Ext Edema] : no lower extremity edema [Abdominal Pain] : no abdominal pain [SOB on Exertion] : no shortness of breath during exertion [Constipation] : no constipation [Muscle Pain] : no muscle pain

## 2020-07-23 LAB
ALBUMIN MFR SERPL ELPH: 58.6 %
ALBUMIN SERPL-MCNC: 3.7 G/DL
ALBUMIN/GLOB SERPL: 1.4 RATIO
ALPHA1 GLOB MFR SERPL ELPH: 6.1 %
ALPHA1 GLOB SERPL ELPH-MCNC: 0.4 G/DL
ALPHA2 GLOB MFR SERPL ELPH: 11.4 %
ALPHA2 GLOB SERPL ELPH-MCNC: 0.7 G/DL
B-GLOBULIN MFR SERPL ELPH: 10.6 %
B-GLOBULIN SERPL ELPH-MCNC: 0.7 G/DL
DEPRECATED KAPPA LC FREE/LAMBDA SER: 0.83 RATIO
GAMMA GLOB FLD ELPH-MCNC: 0.8 G/DL
GAMMA GLOB MFR SERPL ELPH: 13.3 %
IGA SER QL IEP: 177 MG/DL
IGG SER QL IEP: 742 MG/DL
IGM SER QL IEP: 24 MG/DL
INTERPRETATION SERPL IEP-IMP: NORMAL
KAPPA LC CSF-MCNC: 7.86 MG/DL
KAPPA LC SERPL-MCNC: 6.53 MG/DL
M PROTEIN SPEC IFE-MCNC: NORMAL
PROT SERPL-MCNC: 6.3 G/DL
PROT SERPL-MCNC: 6.3 G/DL

## 2020-08-24 ENCOUNTER — APPOINTMENT (OUTPATIENT)
Dept: HEMATOLOGY ONCOLOGY | Facility: CLINIC | Age: 70
End: 2020-08-24

## 2020-08-31 ENCOUNTER — OUTPATIENT (OUTPATIENT)
Dept: OUTPATIENT SERVICES | Facility: HOSPITAL | Age: 70
LOS: 1 days | Discharge: HOME | End: 2020-08-31

## 2020-08-31 ENCOUNTER — LABORATORY RESULT (OUTPATIENT)
Age: 70
End: 2020-08-31

## 2020-08-31 ENCOUNTER — APPOINTMENT (OUTPATIENT)
Dept: HEMATOLOGY ONCOLOGY | Facility: CLINIC | Age: 70
End: 2020-08-31
Payer: MEDICARE

## 2020-08-31 VITALS
TEMPERATURE: 96.1 F | DIASTOLIC BLOOD PRESSURE: 71 MMHG | WEIGHT: 156 LBS | RESPIRATION RATE: 14 BRPM | BODY MASS INDEX: 25.07 KG/M2 | SYSTOLIC BLOOD PRESSURE: 164 MMHG | HEIGHT: 66 IN | HEART RATE: 72 BPM

## 2020-08-31 DIAGNOSIS — N18.9 CHRONIC KIDNEY DISEASE, UNSPECIFIED: ICD-10-CM

## 2020-08-31 DIAGNOSIS — C90.00 MULTIPLE MYELOMA NOT HAVING ACHIEVED REMISSION: ICD-10-CM

## 2020-08-31 LAB
ALBUMIN SERPL ELPH-MCNC: 3.9 G/DL
ALP BLD-CCNC: 54 U/L
ALT SERPL-CCNC: 8 U/L
ANION GAP SERPL CALC-SCNC: 16 MMOL/L
AST SERPL-CCNC: 11 U/L
BILIRUB SERPL-MCNC: <0.2 MG/DL
BUN SERPL-MCNC: 63 MG/DL
CALCIUM SERPL-MCNC: 9.8 MG/DL
CHLORIDE SERPL-SCNC: 106 MMOL/L
CO2 SERPL-SCNC: 17 MMOL/L
CREAT SERPL-MCNC: 6.5 MG/DL
GLUCOSE SERPL-MCNC: 116 MG/DL
HCT VFR BLD CALC: 33.5 %
HGB BLD-MCNC: 10.6 G/DL
MCHC RBC-ENTMCNC: 24.9 PG
MCHC RBC-ENTMCNC: 31.6 G/DL
MCV RBC AUTO: 78.6 FL
PLATELET # BLD AUTO: 168 K/UL
PMV BLD: 10.1 FL
POTASSIUM SERPL-SCNC: 4.3 MMOL/L
PROT SERPL-MCNC: 6.1 G/DL
RBC # BLD: 4.26 M/UL
RBC # FLD: 17.4 %
SODIUM SERPL-SCNC: 139 MMOL/L
WBC # FLD AUTO: 7.97 K/UL

## 2020-08-31 PROCEDURE — 99213 OFFICE O/P EST LOW 20 MIN: CPT

## 2020-08-31 NOTE — ASSESSMENT
[FreeTextEntry1] : #Free light chain multiple myeloma, specifically lambda.  Initial beta-2 was 27.1.  His bone marrow cytogenetics was normal.  He is status post four cycles of VCD.  This is followed by autologous bone marrow transplant in 07/2015,he has been on  weekly Velcade maintenance, He is also being followed by Dr. Krysta Rod in [place].  He currently takes his acyclovir.  Last myeloma panel  showed increasing lambda light chains but marrow showed minimal plasma cells with PET CT no progression.  HIs lambda continues to increase supporting biochemical recurrence with worsening creatinine  He was started on Daratumumab, Revlimid 5 mg daily d1-21 and  Dexamethasone  40 mg weekly PO with great response and is nos most likey in CR. Patient was taking Revlimid every other day and it was held for pancytopenia.  On 6/18/18 went on Revlimid maintenance . Achieved a VGPR/CR. Since 2/2019 Serum LUCIA is now positive thus Relapse from complete response,  but  no evidence of clinical relapse thus would keep going with Revlimid. If affected light chain begins to rise constantly  will check PET/CT possible repeat marrow and will consider change in therapy, multiple options such as restating Esthela and going to pomalidomide with Dex or Pom Dex and Slam7 inhibition or Esthela, carfilzomib and dex or  clinical trial if available  \par - c/w Revlimid 2.5 mg every other day d1-21 every 28 days (takes 10 tabs) \par - Myeloma panel resent, has been stable.\par - refuses colonoscopy \par \par #Chronic kidney disease from multiple myeloma.  He has  been on dialysis in the past but is now off.  \par - creatinine has been relatively stable.  He follows up with Dr. Allen at least twice a year.\par \par #Normocytic anemia, related to chronic kidney disease and Revlimid was on   Procrit  60,000 units every  other  week and s/p IV iron .  On hold again since Hgb >10 g/dL\par - will keep saturation over 20% and Ferritin over 100 if goes back on Procrit  \par \par 4. Moderate to severe   thrombocytopenia most likely from Revlimid. \par - Resolved\par \par 5. HTN \par - on meds, will follow-up with his PCP for adjustment, stable today but systolic is still a bit high\par \par RTC in  1 month.

## 2020-08-31 NOTE — CONSULT LETTER
[Dear  ___] : Dear  [unfilled], [Courtesy Letter:] : I had the pleasure of seeing your patient, [unfilled], in my office today. [Sincerely,] : Sincerely, [Referral Closing:] : Thank you very much for seeing this patient.  If you have any questions, please do not hesitate to contact me. [FreeTextEntry3] : Andrew Pantoja [DrOrquidea  ___] : Dr. RECIO

## 2020-08-31 NOTE — HISTORY OF PRESENT ILLNESS
[de-identified] : REASON FOR FOLLOWUP:  Maintenance Velcade for lambda light chain myeloma, status post autologous stem cell transplant.\par \par TREATMENT HISTORY:  On 05/02/2015, he was diagnosed with lambda light chain multiple myeloma when he had a syncopal episode in [place] and was found to be in acute renal failure.  He underwent a bone marrow biopsy that confirmed lambda light chain multiple myeloma.  His beta2 microglobulin was initially 27.1 on diagnosis.  His lambda light chain prior to treatment was as high as 31601.2 on 02/27/2015.  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  There was also diffuse acute tubular injury and modular tubular atrophy with interstitial fibrosis initially on dialysis, but currently been off dialysis for approximately one year.\par \par His treatment summary is as follows.  He initiated Velcade, Cytoxan, and dexamethasone, cycle started on 03/13/2015.  He completed four cycles on 05/08/2015.  He then underwent an autologous bone marrow transplant in 07/2015 at [place].  He has been on maintenance Velcade but in 6/17 was started on DRD due to  rising lambda with normal kappa.\par \par Mr. Motta is a very pleasant 66yearold gentleman with history of lambda light chain multiple myeloma, treated as above.\par  [FreeTextEntry1] : DRD started  on 6/21/17 went on  Revlimid maintenance 6/18/18 [Therapy: ___] : Therapy: [unfilled] [de-identified] : He presents today for followup she is currently on maintenance Velcade.his blood work from October 27 demonstrated a stage a faint lambda band on urine immunofixation. Nomi a count 7.66 hemoglobin 8.3 platelet count of 132. The SPEP was normal. Creatinine was stable at 5.93 potassium was 5.2 total protein 5.9 unremarkable LFTs free kappa lambda ratio normal 1.27 with free kappa at 42 and free lambda at 33.1. Most recent blood work from 1110 2016 shows a hemoglobin of 7.8. \par \par he was recently seen by Dr. Rod in  [place]. He had blood work done and reports that everything was stable. Dr. Rod also recommended to decrease his Velcade dose to 1.3 mg/m2\par \par He has no complaints. He denies any neuropathy.\par \par 1/4/17\par Presents for follow up today. Doing well on Maintenance Velcade.  No complaints. No neuropathy. \par \par 2/1/17\par He presents for follow up today. He has no complaints. Tolerating Velcade well.\par \par 4/5/17\par Doing well No complaints blood work from last visit was stable.\par \par 5/1/17\par He presents for follow up. He had increasing lambda light chains. Repeat was stable. A bone marrow showed minimal plasma cells and PET CT did not show any new activity.  He has no complaints.\par \par 6/5/2017\par Presents for follow up. He is doing well. He has some fatigue. His  Lambda has been slowly increasing. He has no other complaints.  No neuropathy\par \par 6/12/17\par He was brought back to discuss his lab work. His Lambda has been increasing with stable Kappa as well as slight worsening of his of creatinine He has no complaints.\par \par 7/12/17\par He has been on DRD. His HgB dropped to 5.9 and he is s/p PRBC transfusion.  He finished Revlimid on 7/9. His only complaint is leg crams at night. \par \par 8/9/17\par He is here for follow up. His cramps went away with the lower dose of Revlimid. His His lambda is coming down. He saw Dr. Rod last week as per patient.\par \par 8/30/27\par He is doing well. His legs cramps are better. He has severe anemia but he is asymptomatic. HIs creatinine is better and his light chain is at baseline.\par \par 9/27/17\par He is here for follow up. He needed  blood transfusion and hgb has been stable at 8 since than. He is also status post Venofer x2. He is on Procrit\par  40,000 units every 2 weeks. He feels well otherwise. \par \par 11/20/17\par Patient feels well and has no complaints. States he was taking Revlimid 5mg q48hrs prior to stopping for low counts. Patient will get Procrit and Darzalex. Patient will be restarted on Revilimid 2.5mg. Patient will continue every 2week Darzalex until week 25 then it becomes every 4 weeks. \par \par 12/18/17\par He is here for follow up. He is doing well. Blood work from last month still in CR. Cr and counts are better. He has no complaints and feel great.\par \par 1/22/18\par He is here for follow up. He is recovering from a cold. He is taking a prednisone taper given by his PMD. Had mules pain from cough.\par \par 2/26/18\par He is doing well. He has no complaints. His labs have been stable his light chain only went up slightly but ratio is normal. \par \par 3/27/18\par He is here for follow up he is doing well. He had blood work in [facility]  where he saw Dr. Rod 3/26/18 WBC 6.7, HgB 11.3, Pts 140, LFT WNL, , Ca 9.7. Cr. 5.96, alb 3.2\par \par He feels well. \par \par 4/23/18\par No events feels well. No complaints\par \par 5/21/18\par He is here for follow up. Doing well. Has no complaints\par \par 6/18/18\par His hgb has been dropping on monthly Procrit, His ligh chains are normal last Serum LUCIA from april 23 showed IgG kappa band thus he achieved aVGPR for DRD. He has no complaints.\par \par 7/16/18\par He is here for follow up. Doing well. No complaints. His kitten bit him.\par \par 8/13/18\par He is doing well. No complains. His HGb has been over 11 gm/dl So we are holding his procrit.\par \par 9/10/18\par He is here for follow up. He Myleoma panel is stable. Hgb is stable. He is due for his vaccinations 2 years post Auto and will have them today. He started Revlimid about 1 week ago. He has no complaints.\par \par 10/10/18\par He is here for follow up. He is doing well. BP is a bit high will watch. He has no complaints. He has his revlamid.\par \par 11/7/18\par He is here for follow up. His last hgb was under 10 gm/dl.  He is doing well otherwise. Was started on something for his blood pressure by Dr. Allen but he does not know what it is. He has his Revlimid\par \par 12/10/18\par Patient is here for a follow-up visit.  He is feeling well with no complaints.  Most recent CBC is stable.  Patient denies fever, chills, nausea, vomiting.  The light chains are picking up but it may be related to the CKD since LUCIA is negative and ratio is normal.\par \par 1/21/19\par He is here for follow-up with his wife.  His Kappa+Lambda have slightly risen, but the ratio is stable, so this is likely due to his CKD.   Most recent CBC is stable.  He received a prescription for Revlimid 5mg so he has been taking them every other day.  He is otherwise feeling well.  The rest of the CRAB criteria remains the same.  \par \par 2/25/19\par He is here with his wife.  As both his light chains are increasing, it is likely it is from his renal failure.  His most recent Hgb is 10.8g/dL, so we may stop when he nears 11.0g/dL.  He will see Dr. Rod on 3/25/19.   \par \par 3/20/19\par He is here for follow-up of MM with his wife.  His Hgb is stable at 11.3g/dL.  His BP is slightly elevated today. He will speak with his PCP.  He is otherwise feeling well.\par \par 4/22/19\par As of February, the immunofixation is picking up weak IgG Kappa bands no CRAB criteria  or significant rise in paraprotein that suggests progression .  We will continue with Revlimid at this time.   They went to see Dr. Rod from [facility] last month.   He feels well otherwise.\par \par 5/20/19\par He is here for follow up. He is doing well. His HgB is coming down so we will restart Procrit today. MM panel from March stable.\par \par 6/17/19\par He is here for follow-up of MM with his wife.  The MM panel from May was stable.  His hgb is 11.8g/dL today, so he does not need procrit.  He is feeling well. \par \par 7/15/19\par He is here for follow up. His  Myeloma panel from June is stable, Serum LUCIA remains negative.\par \par 8/12/19\par He is here for follow up. paraproteins from July are stable with negative serum LUCIA. His HgB is 10.9. he has no complaints. \par \par 9/16/19\par He is here for follow up. On day 4 of Revlimid. No complaints. HGB is stable. Serologically still in CR.\par \par 10/21/19\par he is here for follow up. No deviance of progression on last labs. he feels well.  On his off week of Revlimid. No complaints. to get his flu shot and pneumococcal vaccine with PMD next week.\par \par 11/18/19\par He is here for follow up of MM.  He is feeling well today.  He is seated with his wife.  He denies any bony pain or weight loss.  This is his off week for Revlimid.  No new complaints.  He is due to  start new cycle of Revlimid next Monday.  Hgb has improved to 11g/dL so we will hold procrit for now.  \par \par 12/23/19\par Patient is here for a follow-up visit for myeloma.  He is feeling well with no new complaints, tolerating revlimid 2.5 every other day.  This is his D2 of the next cycle of Revlimid, no refill needed at this time.  Most recent CBC is stable with hgb 11.2g/dL.  Patient denies fever, chills, nausea, vomiting, new pain or bleeding.  We will continue to hold Procrit for now.  \par \par 1/20/2020\par Patient is here for a follow-up visit for myeloma accompanied by his spouse.  He is feeling well with no new complaints, tolerating revlimid 2.5 every other day.  He has just begun the next cycle of Revlimid, no refill needed at this time.  Most recent CBC is stable with hgb 11.5g/dL.  Patient denies fever, chills, nausea, vomiting, new bony pain or bleeding.  We will continue to hold Procrit for now as his hgb is at target.  \par \par 2/24/2020\par Patient is here for a follow-up visit for myeloma accompanied by his spouse.   He is feeling well with no new complaints, tolerating revlimid 2.5 every other day.   Most recent CBC with hgb 10.8g/dL.  Myeloma panel from 01/2020 is stable with no quantifiable M-spike.  He is approaching week off of Revlimid and requesting refill.  \par \par 4/20/2020\par Patient is here for a follow-up visit for multiple myeloma.   He is feeling well with no new complaints, tolerating revlimid 2.5 every other day.   Most recent CBC with hgb 10.2g/dL.  Myeloma panel from 01/2020 remains with no quantifiable M-spike.   He has no complaints.\par \par \par 5/18/20\par He is here for follow up. he feels well. On revlamid. His last myeloma panel is without progression. His hgb is over 10 today.\par \par 7/20/20\par He is doing well he has no complaints. CBC showed stable hgb at 10.7. \par \par \par 8/31/20\par He is here for follow up. He is doing wel.. No isseus with Revlimid. Hgb remains over 10.  Myeloma labs remain unchanged with negative  serum LUCIA

## 2020-08-31 NOTE — RESULTS/DATA
[FreeTextEntry1] :  Result Name Results Units Reference Range \par      PET CT WHOLE BODY TOP OF SKULL TO TI   SEE TEXT       \par     Patient Name: MOR, AUGUST : 1950\par Patient ID: [id number]\par Account: [id number]\par Patient Location: [facility]\par Accession: [id number]\par Procedure: PET CT WHOLE BODY TOP OF SKULL TO TIP OF TOES SUBSEQUENT [id number]\par Date of Exam: 2017 11:09 AM\par Attending Physician: JARVIS LOW\par Requesting Physician: JARVIS LOW MD\par Clinical History / Reason for exam: FDG PET CT STUDY:\par Reason for examination: Tumor imaging - PET with concurrently acquired CT for\par attenuation correction and anatomic localization;  top of the skull  to toes/\par [address] multiple myeloma, subsequent treatment strategy.\par ICD-10 code:C90.0\par History: 66-year-old male patient with history of multiple myeloma, last\par chemotherapy received was in 2017. Patient had bone biopsy on\par 2017.\par Blood glucose pre injection 102 mg/dL\par Technique:\par Approximately 45 minutes after the intravenous administration of 13.4  mCi\par 18-Fluorine FDG, whole body PET images were acquired from top of  the skull to\par toes. In addition, non-iv and non oral contrast, low dose, non - diagnostic CT\par was acquired for attenuation correction and anatomic correlation only.\par Findings:\par Comparison: Prior PET CT study done on July 3, 2015. Correlation was performed\par with the skeletal survey done on 2016.\par Head /Neck: Physiologic FDG uptake is seen in the visualized region of the\par brain, pharyngeal lymphoid tissue, and salivary glands.\par Chest:\par Physiologic FDG avid PET is seen in the mediastinal blood pool and myocardium.\par Chest wall: Unremarkable\par Lungs: No abnormal uptake.\par Mediastinum/Pleura/pericardium: No abnormal uptake.\par Thoracic/ axillary lymph nodes: No abnormal uptake.\par Hepatobiliary: Unremarkable.\par Gallbladder: Multiple gallstones.\par Spleen: No abnormal uptake\par Pancreas: No abnormal uptake.\par Adrenal glands: No abnormal uptake.\par Kidneys/ureters/bladder: Normal excretory activity is demonstrated.\par Abdominal pelvic lymph nodes: No abnormal uptake.\par Bowel/peritoneum/mesentery: No abnormal uptake.\par Pelvic organs: Unremarkable. No abnormal increased uptake. Prosthetic\par calcifications.\par Bones/soft tissues: Osteoarthritic changes are identified in the bones. \par Patient\par has multiple extensive lytic lesions in the skeletal, seen in the skeletal\par survey  for details see the report  on 2016.\par In the prior examination there was PET positive lytic lesion right fifth rib\par with a max SUV 2.6, now non-FDG avid   and L5 vertebral body on the left side\par with a max SUV 3.8, now non-FDG avid with multiple other scattered lytic\par lesions, non-FDG avid.\par No abnormal increased uptake is seen in the lower extremities.\par Impression:\par 1. No new areas of abnormal increased uptake is seen.\par 2. Previously seen abnormal increased uptake seen in the right fifth rib and \par L5\par vertebral body on the left side, at this time both regions are non-FDG avid.\par 3. Multiple extensive lytic lesions in the skeletal consistent with multiple\par myeloma.\par 4. No other areas of abnormal increased uptake is seen.\par I the attending attest that I have personally reviewed these images and agree\par with this report.\par \par  \par

## 2020-08-31 NOTE — REVIEW OF SYSTEMS
[Fever] : no fever [Fatigue] : no fatigue [Chills] : no chills [Mucosal Pain] : no mucosal pain [Dry Eyes] : no dryness of the eyes [Leg Claudication] : no intermittent leg claudication [Lower Ext Edema] : no lower extremity edema [SOB on Exertion] : no shortness of breath during exertion [Abdominal Pain] : no abdominal pain [Constipation] : no constipation [Muscle Pain] : no muscle pain [Negative] : Psychiatric

## 2020-09-02 LAB
ALBUMIN MFR SERPL ELPH: 58.7 %
ALBUMIN SERPL-MCNC: 3.6 G/DL
ALBUMIN/GLOB SERPL: 1.4 RATIO
ALPHA1 GLOB MFR SERPL ELPH: 5.6 %
ALPHA1 GLOB SERPL ELPH-MCNC: 0.3 G/DL
ALPHA2 GLOB MFR SERPL ELPH: 11.9 %
ALPHA2 GLOB SERPL ELPH-MCNC: 0.7 G/DL
B-GLOBULIN MFR SERPL ELPH: 10.7 %
B-GLOBULIN SERPL ELPH-MCNC: 0.7 G/DL
DEPRECATED KAPPA LC FREE/LAMBDA SER: 0.75 RATIO
GAMMA GLOB FLD ELPH-MCNC: 0.8 G/DL
GAMMA GLOB MFR SERPL ELPH: 13.1 %
IGA SER QL IEP: 182 MG/DL
IGG SER QL IEP: 758 MG/DL
IGM SER QL IEP: 23 MG/DL
INTERPRETATION SERPL IEP-IMP: NORMAL
KAPPA LC CSF-MCNC: 14.91 MG/DL
KAPPA LC SERPL-MCNC: 11.15 MG/DL
M PROTEIN SPEC IFE-MCNC: NORMAL
PROT SERPL-MCNC: 6.1 G/DL
PROT SERPL-MCNC: 6.1 G/DL

## 2020-09-21 ENCOUNTER — LABORATORY RESULT (OUTPATIENT)
Age: 70
End: 2020-09-21

## 2020-09-21 ENCOUNTER — APPOINTMENT (OUTPATIENT)
Dept: HEMATOLOGY ONCOLOGY | Facility: CLINIC | Age: 70
End: 2020-09-21
Payer: MEDICARE

## 2020-09-21 LAB
HCT VFR BLD CALC: 34 %
HGB BLD-MCNC: 10.5 G/DL
MCHC RBC-ENTMCNC: 24.9 PG
MCHC RBC-ENTMCNC: 30.9 G/DL
MCV RBC AUTO: 80.8 FL
PLATELET # BLD AUTO: 195 K/UL
PMV BLD: 8.8 FL
RBC # BLD: 4.21 M/UL
RBC # FLD: 18.6 %
WBC # FLD AUTO: 6.77 K/UL

## 2020-09-21 PROCEDURE — 99213 OFFICE O/P EST LOW 20 MIN: CPT

## 2020-09-21 NOTE — REVIEW OF SYSTEMS
[Negative] : Psychiatric [Fever] : no fever [Chills] : no chills [Fatigue] : no fatigue [Dry Eyes] : no dryness of the eyes [Mucosal Pain] : no mucosal pain [Leg Claudication] : no intermittent leg claudication [Lower Ext Edema] : no lower extremity edema [SOB on Exertion] : no shortness of breath during exertion [Abdominal Pain] : no abdominal pain [Constipation] : no constipation [Muscle Pain] : no muscle pain

## 2020-09-21 NOTE — HISTORY OF PRESENT ILLNESS
[Therapy: ___] : Therapy: [unfilled] [FreeTextEntry1] : DRD started  on 6/21/17 went on  Revlimid maintenance 6/18/18 [de-identified] : He presents today for followup she is currently on maintenance Velcade.his blood work from October 27 demonstrated a stage a faint lambda band on urine immunofixation. Nomi a count 7.66 hemoglobin 8.3 platelet count of 132. The SPEP was normal. Creatinine was stable at 5.93 potassium was 5.2 total protein 5.9 unremarkable LFTs free kappa lambda ratio normal 1.27 with free kappa at 42 and free lambda at 33.1. Most recent blood work from 1110 2016 shows a hemoglobin of 7.8. \par \par he was recently seen by Dr. Rod in  [place]. He had blood work done and reports that everything was stable. Dr. Rod also recommended to decrease his Velcade dose to 1.3 mg/m2\par \par He has no complaints. He denies any neuropathy.\par \par 1/4/17\par Presents for follow up today. Doing well on Maintenance Velcade.  No complaints. No neuropathy. \par \par 2/1/17\par He presents for follow up today. He has no complaints. Tolerating Velcade well.\par \par 4/5/17\par Doing well No complaints blood work from last visit was stable.\par \par 5/1/17\par He presents for follow up. He had increasing lambda light chains. Repeat was stable. A bone marrow showed minimal plasma cells and PET CT did not show any new activity.  He has no complaints.\par \par 6/5/2017\par Presents for follow up. He is doing well. He has some fatigue. His  Lambda has been slowly increasing. He has no other complaints.  No neuropathy\par \par 6/12/17\par He was brought back to discuss his lab work. His Lambda has been increasing with stable Kappa as well as slight worsening of his of creatinine He has no complaints.\par \par 7/12/17\par He has been on DRD. His HgB dropped to 5.9 and he is s/p PRBC transfusion.  He finished Revlimid on 7/9. His only complaint is leg crams at night. \par \par 8/9/17\par He is here for follow up. His cramps went away with the lower dose of Revlimid. His His lambda is coming down. He saw Dr. Rod last week as per patient.\par \par 8/30/27\par He is doing well. His legs cramps are better. He has severe anemia but he is asymptomatic. HIs creatinine is better and his light chain is at baseline.\par \par 9/27/17\par He is here for follow up. He needed  blood transfusion and hgb has been stable at 8 since than. He is also status post Venofer x2. He is on Procrit\par  40,000 units every 2 weeks. He feels well otherwise. \par \par 11/20/17\par Patient feels well and has no complaints. States he was taking Revlimid 5mg q48hrs prior to stopping for low counts. Patient will get Procrit and Darzalex. Patient will be restarted on Revilimid 2.5mg. Patient will continue every 2week Darzalex until week 25 then it becomes every 4 weeks. \par \par 12/18/17\par He is here for follow up. He is doing well. Blood work from last month still in CR. Cr and counts are better. He has no complaints and feel great.\par \par 1/22/18\par He is here for follow up. He is recovering from a cold. He is taking a prednisone taper given by his PMD. Had mules pain from cough.\par \par 2/26/18\par He is doing well. He has no complaints. His labs have been stable his light chain only went up slightly but ratio is normal. \par \par 3/27/18\par He is here for follow up he is doing well. He had blood work in [facility]  where he saw Dr. Rod 3/26/18 WBC 6.7, HgB 11.3, Pts 140, LFT WNL, , Ca 9.7. Cr. 5.96, alb 3.2\par \par He feels well. \par \par 4/23/18\par No events feels well. No complaints\par \par 5/21/18\par He is here for follow up. Doing well. Has no complaints\par \par 6/18/18\par His hgb has been dropping on monthly Procrit, His ligh chains are normal last Serum LUCIA from april 23 showed IgG kappa band thus he achieved aVGPR for DRD. He has no complaints.\par \par 7/16/18\par He is here for follow up. Doing well. No complaints. His kitten bit him.\par \par 8/13/18\par He is doing well. No complains. His HGb has been over 11 gm/dl So we are holding his procrit.\par \par 9/10/18\par He is here for follow up. He Myleoma panel is stable. Hgb is stable. He is due for his vaccinations 2 years post Auto and will have them today. He started Revlimid about 1 week ago. He has no complaints.\par \par 10/10/18\par He is here for follow up. He is doing well. BP is a bit high will watch. He has no complaints. He has his revlamid.\par \par 11/7/18\par He is here for follow up. His last hgb was under 10 gm/dl.  He is doing well otherwise. Was started on something for his blood pressure by Dr. Allen but he does not know what it is. He has his Revlimid\par \par 12/10/18\par Patient is here for a follow-up visit.  He is feeling well with no complaints.  Most recent CBC is stable.  Patient denies fever, chills, nausea, vomiting.  The light chains are picking up but it may be related to the CKD since LUCIA is negative and ratio is normal.\par \par 1/21/19\par He is here for follow-up with his wife.  His Kappa+Lambda have slightly risen, but the ratio is stable, so this is likely due to his CKD.   Most recent CBC is stable.  He received a prescription for Revlimid 5mg so he has been taking them every other day.  He is otherwise feeling well.  The rest of the CRAB criteria remains the same.  \par \par 2/25/19\par He is here with his wife.  As both his light chains are increasing, it is likely it is from his renal failure.  His most recent Hgb is 10.8g/dL, so we may stop when he nears 11.0g/dL.  He will see Dr. Rod on 3/25/19.   \par \par 3/20/19\par He is here for follow-up of MM with his wife.  His Hgb is stable at 11.3g/dL.  His BP is slightly elevated today. He will speak with his PCP.  He is otherwise feeling well.\par \par 4/22/19\par As of February, the immunofixation is picking up weak IgG Kappa bands no CRAB criteria  or significant rise in paraprotein that suggests progression .  We will continue with Revlimid at this time.   They went to see Dr. Rod from [facility] last month.   He feels well otherwise.\par \par 5/20/19\par He is here for follow up. He is doing well. His HgB is coming down so we will restart Procrit today. MM panel from March stable.\par \par 6/17/19\par He is here for follow-up of MM with his wife.  The MM panel from May was stable.  His hgb is 11.8g/dL today, so he does not need procrit.  He is feeling well. \par \par 7/15/19\par He is here for follow up. His  Myeloma panel from June is stable, Serum LUCIA remains negative.\par \par 8/12/19\par He is here for follow up. paraproteins from July are stable with negative serum LUCIA. His HgB is 10.9. he has no complaints. \par \par 9/16/19\par He is here for follow up. On day 4 of Revlimid. No complaints. HGB is stable. Serologically still in CR.\par \par 10/21/19\par he is here for follow up. No deviance of progression on last labs. he feels well.  On his off week of Revlimid. No complaints. to get his flu shot and pneumococcal vaccine with PMD next week.\par \par 11/18/19\par He is here for follow up of MM.  He is feeling well today.  He is seated with his wife.  He denies any bony pain or weight loss.  This is his off week for Revlimid.  No new complaints.  He is due to  start new cycle of Revlimid next Monday.  Hgb has improved to 11g/dL so we will hold procrit for now.  \par \par 12/23/19\par Patient is here for a follow-up visit for myeloma.  He is feeling well with no new complaints, tolerating revlimid 2.5 every other day.  This is his D2 of the next cycle of Revlimid, no refill needed at this time.  Most recent CBC is stable with hgb 11.2g/dL.  Patient denies fever, chills, nausea, vomiting, new pain or bleeding.  We will continue to hold Procrit for now.  \par \par 1/20/2020\par Patient is here for a follow-up visit for myeloma accompanied by his spouse.  He is feeling well with no new complaints, tolerating revlimid 2.5 every other day.  He has just begun the next cycle of Revlimid, no refill needed at this time.  Most recent CBC is stable with hgb 11.5g/dL.  Patient denies fever, chills, nausea, vomiting, new bony pain or bleeding.  We will continue to hold Procrit for now as his hgb is at target.  \par \par 2/24/2020\par Patient is here for a follow-up visit for myeloma accompanied by his spouse.   He is feeling well with no new complaints, tolerating revlimid 2.5 every other day.   Most recent CBC with hgb 10.8g/dL.  Myeloma panel from 01/2020 is stable with no quantifiable M-spike.  He is approaching week off of Revlimid and requesting refill.  \par \par 4/20/2020\par Patient is here for a follow-up visit for multiple myeloma.   He is feeling well with no new complaints, tolerating revlimid 2.5 every other day.   Most recent CBC with hgb 10.2g/dL.  Myeloma panel from 01/2020 remains with no quantifiable M-spike.   He has no complaints.\par \par \par 5/18/20\par He is here for follow up. he feels well. On revlamid. His last myeloma panel is without progression. His hgb is over 10 today.\par \par 7/20/20\par He is doing well he has no complaints. CBC showed stable hgb at 10.7. \par \par \par 8/31/20\par He is here for follow up. He is doing wel.. No isseus with Revlimid. Hgb remains over 10.  Myeloma labs remain unchanged with negative  serum LUCIA.\par \par \par 9/21/20\par He is here for follow up. His last Myeloma panel was unchanged bala still in CR SIFE is negative  CBC is stable.  He is on revlmaid just recently started this cycle.  [de-identified] : REASON FOR FOLLOWUP:  Maintenance Velcade for lambda light chain myeloma, status post autologous stem cell transplant.\par \par TREATMENT HISTORY:  On 05/02/2015, he was diagnosed with lambda light chain multiple myeloma when he had a syncopal episode in [place] and was found to be in acute renal failure.  He underwent a bone marrow biopsy that confirmed lambda light chain multiple myeloma.  His beta2 microglobulin was initially 27.1 on diagnosis.  His lambda light chain prior to treatment was as high as 23098.2 on 02/27/2015.  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  There was also diffuse acute tubular injury and modular tubular atrophy with interstitial fibrosis initially on dialysis, but currently been off dialysis for approximately one year.\par \par His treatment summary is as follows.  He initiated Velcade, Cytoxan, and dexamethasone, cycle started on 03/13/2015.  He completed four cycles on 05/08/2015.  He then underwent an autologous bone marrow transplant in 07/2015 at [place].  He has been on maintenance Velcade but in 6/17 was started on DRD due to  rising lambda with normal kappa.\par \par Mr. Motta is a very pleasant 66yearold gentleman with history of lambda light chain multiple myeloma, treated as above.\par

## 2020-09-21 NOTE — RESULTS/DATA
[FreeTextEntry1] :  Result Name Results Units Reference Range \par      PET CT WHOLE BODY TOP OF SKULL TO TI   SEE TEXT       \par     Patient Name: MOR, AUGUST : 1950\par Patient ID: [id number]\par Account: [id number]\par Patient Location: [facility]\par Accession: [id number]\par Procedure: PET CT WHOLE BODY TOP OF SKULL TO TIP OF TOES SUBSEQUENT 250-0036\par Date of Exam: 2017 11:09 AM\par Attending Physician: JARVIS LOW\par Requesting Physician: JARVIS LOW MD\par Clinical History / Reason for exam: FDG PET CT STUDY:\par Reason for examination: Tumor imaging - PET with concurrently acquired CT for\par attenuation correction and anatomic localization;  top of the skull  to toes/\par 18899 multiple myeloma, subsequent treatment strategy.\par ICD-10 code:C90.0\par History: 66-year-old male patient with history of multiple myeloma, last\par chemotherapy received was in 2017. Patient had bone biopsy on\par 2017.\par Blood glucose pre injection 102 mg/dL\par Technique:\par Approximately 45 minutes after the intravenous administration of 13.4  mCi\par 18-Fluorine FDG, whole body PET images were acquired from top of  the skull to\par toes. In addition, non-iv and non oral contrast, low dose, non - diagnostic CT\par was acquired for attenuation correction and anatomic correlation only.\par Findings:\par Comparison: Prior PET CT study done on July 3, 2015. Correlation was performed\par with the skeletal survey done on 2016.\par Head /Neck: Physiologic FDG uptake is seen in the visualized region of the\par brain, pharyngeal lymphoid tissue, and salivary glands.\par Chest:\par Physiologic FDG avid PET is seen in the mediastinal blood pool and myocardium.\par Chest wall: Unremarkable\par Lungs: No abnormal uptake.\par Mediastinum/Pleura/pericardium: No abnormal uptake.\par Thoracic/ axillary lymph nodes: No abnormal uptake.\par Hepatobiliary: Unremarkable.\par Gallbladder: Multiple gallstones.\par Spleen: No abnormal uptake\par Pancreas: No abnormal uptake.\par Adrenal glands: No abnormal uptake.\par Kidneys/ureters/bladder: Normal excretory activity is demonstrated.\par Abdominal pelvic lymph nodes: No abnormal uptake.\par Bowel/peritoneum/mesentery: No abnormal uptake.\par Pelvic organs: Unremarkable. No abnormal increased uptake. Prosthetic\par calcifications.\par Bones/soft tissues: Osteoarthritic changes are identified in the bones. \par Patient\par has multiple extensive lytic lesions in the skeletal, seen in the skeletal\par survey  for details see the report  on 2016.\par In the prior examination there was PET positive lytic lesion right fifth rib\par with a max SUV 2.6, now non-FDG avid   and L5 vertebral body on the left side\par with a max SUV 3.8, now non-FDG avid with multiple other scattered lytic\par lesions, non-FDG avid.\par No abnormal increased uptake is seen in the lower extremities.\par Impression:\par 1. No new areas of abnormal increased uptake is seen.\par 2. Previously seen abnormal increased uptake seen in the right fifth rib and \par L5\par vertebral body on the left side, at this time both regions are non-FDG avid.\par 3. Multiple extensive lytic lesions in the skeletal consistent with multiple\par myeloma.\par 4. No other areas of abnormal increased uptake is seen.\par I the attending attest that I have personally reviewed these images and agree\par with this report.\par \par  \par

## 2020-09-21 NOTE — ASSESSMENT
[FreeTextEntry1] : #Free light chain multiple myeloma, specifically lambda.  Initial beta-2 was 27.1.  His bone marrow cytogenetics was normal.  He is status post four cycles of VCD.  This is followed by autologous bone marrow transplant in 07/2015,he has been on  weekly Velcade maintenance, He is also being followed by Dr. Krysta Rod in [place].  He currently takes his acyclovir.  Last myeloma panel  showed increasing lambda light chains but marrow showed minimal plasma cells with PET CT no progression.  HIs lambda continues to increase supporting biochemical recurrence with worsening creatinine  He was started on Daratumumab, Revlimid 5 mg daily d1-21 and  Dexamethasone  40 mg weekly PO with great response and is nos most likey in CR. Patient was taking Revlimid every other day and it was held for pancytopenia.  On 6/18/18 went on Revlimid maintenance . Achieved a VGPR/CR. Since 2/2019 Serum LUCIA is now positive thus Relapse from complete response,  but  no evidence of clinical relapse thus would keep going with Revlimid. If affected light chain begins to rise constantly  will check PET/CT possible repeat marrow and will consider change in therapy, multiple options such as restating Esthela and going to pomalidomide with Dex or Pom Dex and Slam7 inhibition or Esthela, carfilzomib and dex or  clinical trial if available  \par - c/w Revlimid 2.5 mg every other day d1-21 every 28 days (takes 10 tabs) \par - Myeloma panel resent, has been stable.\par - refuses colonoscopy \par \par #Chronic kidney disease from multiple myeloma.  He has  been on dialysis in the past but is now off.  \par - creatinine has been relatively stable.  He follows up with Dr. Allen at least twice a year.\par \par #Normocytic anemia, related to chronic kidney disease and Revlimid was on   Procrit  60,000 units every  other  week and s/p IV iron .  On hold again since Hgb >10 g/dL\par - will keep saturation over 20% and Ferritin over 100 if goes back on Procrit  \par \par 4. Moderate to severe   thrombocytopenia most likely from Revlimid. \par - Resolved\par \par 5. HTN \par - on meds, will follow-up with his PCP for adjustment, stable today but systolic is still a bit high and he will reach out to Dr. Dooley\par \par RTC in   2 months if blood work is stable.

## 2020-09-22 LAB
ALBUMIN SERPL ELPH-MCNC: 4.1 G/DL
ALP BLD-CCNC: 58 U/L
ALT SERPL-CCNC: 11 U/L
ANION GAP SERPL CALC-SCNC: 13 MMOL/L
AST SERPL-CCNC: 11 U/L
BILIRUB SERPL-MCNC: 0.2 MG/DL
BUN SERPL-MCNC: 59 MG/DL
CALCIUM SERPL-MCNC: 10 MG/DL
CHLORIDE SERPL-SCNC: 108 MMOL/L
CO2 SERPL-SCNC: 19 MMOL/L
CREAT SERPL-MCNC: 5.9 MG/DL
GLUCOSE SERPL-MCNC: 117 MG/DL
POTASSIUM SERPL-SCNC: 4.5 MMOL/L
PROT SERPL-MCNC: 6.1 G/DL
SODIUM SERPL-SCNC: 140 MMOL/L

## 2020-09-23 LAB
ALBUMIN MFR SERPL ELPH: 57.7 %
ALBUMIN SERPL-MCNC: 3.5 G/DL
ALBUMIN/GLOB SERPL: 1.3 RATIO
ALPHA1 GLOB MFR SERPL ELPH: 5.6 %
ALPHA1 GLOB SERPL ELPH-MCNC: 0.3 G/DL
ALPHA2 GLOB MFR SERPL ELPH: 12.3 %
ALPHA2 GLOB SERPL ELPH-MCNC: 0.8 G/DL
B-GLOBULIN MFR SERPL ELPH: 11.2 %
B-GLOBULIN SERPL ELPH-MCNC: 0.7 G/DL
DEPRECATED KAPPA LC FREE/LAMBDA SER: 0.31 RATIO
GAMMA GLOB FLD ELPH-MCNC: 0.8 G/DL
GAMMA GLOB MFR SERPL ELPH: 13.2 %
IGA SER QL IEP: 196 MG/DL
IGG SER QL IEP: 902 MG/DL
IGM SER QL IEP: 24 MG/DL
INTERPRETATION SERPL IEP-IMP: NORMAL
KAPPA LC CSF-MCNC: 30.71 MG/DL
KAPPA LC SERPL-MCNC: 9.54 MG/DL
M PROTEIN SPEC IFE-MCNC: NORMAL
PROT SERPL-MCNC: 6.1 G/DL

## 2020-10-27 ENCOUNTER — NON-APPOINTMENT (OUTPATIENT)
Age: 70
End: 2020-10-27

## 2020-10-28 ENCOUNTER — NON-APPOINTMENT (OUTPATIENT)
Age: 70
End: 2020-10-28

## 2020-11-11 ENCOUNTER — RX RENEWAL (OUTPATIENT)
Age: 70
End: 2020-11-11

## 2020-11-18 ENCOUNTER — APPOINTMENT (OUTPATIENT)
Dept: HEMATOLOGY ONCOLOGY | Facility: CLINIC | Age: 70
End: 2020-11-18
Payer: MEDICARE

## 2020-11-18 ENCOUNTER — OUTPATIENT (OUTPATIENT)
Dept: OUTPATIENT SERVICES | Facility: HOSPITAL | Age: 70
LOS: 1 days | Discharge: HOME | End: 2020-11-18

## 2020-11-18 ENCOUNTER — LABORATORY RESULT (OUTPATIENT)
Age: 70
End: 2020-11-18

## 2020-11-18 VITALS
WEIGHT: 156 LBS | TEMPERATURE: 97.6 F | DIASTOLIC BLOOD PRESSURE: 70 MMHG | BODY MASS INDEX: 25.07 KG/M2 | SYSTOLIC BLOOD PRESSURE: 172 MMHG | HEIGHT: 66 IN | HEART RATE: 75 BPM

## 2020-11-18 DIAGNOSIS — C90.00 MULTIPLE MYELOMA NOT HAVING ACHIEVED REMISSION: ICD-10-CM

## 2020-11-18 LAB
ALBUMIN SERPL ELPH-MCNC: 4.1 G/DL
ALP BLD-CCNC: 59 U/L
ALT SERPL-CCNC: 8 U/L
ANION GAP SERPL CALC-SCNC: 13 MMOL/L
AST SERPL-CCNC: 10 U/L
BILIRUB SERPL-MCNC: <0.2 MG/DL
BUN SERPL-MCNC: 62 MG/DL
CALCIUM SERPL-MCNC: 9.6 MG/DL
CHLORIDE SERPL-SCNC: 105 MMOL/L
CO2 SERPL-SCNC: 18 MMOL/L
CREAT SERPL-MCNC: 5.9 MG/DL
GLUCOSE SERPL-MCNC: 99 MG/DL
HCT VFR BLD CALC: 34.6 %
HGB BLD-MCNC: 10.5 G/DL
MCHC RBC-ENTMCNC: 24.7 PG
MCHC RBC-ENTMCNC: 30.3 G/DL
MCV RBC AUTO: 81.4 FL
PLATELET # BLD AUTO: 201 K/UL
PMV BLD: 9.3 FL
POTASSIUM SERPL-SCNC: 4.5 MMOL/L
PROT SERPL-MCNC: 6.5 G/DL
RBC # BLD: 4.25 M/UL
RBC # FLD: 18.7 %
SODIUM SERPL-SCNC: 136 MMOL/L
WBC # FLD AUTO: 6.62 K/UL

## 2020-11-18 PROCEDURE — 99213 OFFICE O/P EST LOW 20 MIN: CPT

## 2020-11-18 NOTE — CONSULT LETTER
[Dear  ___] : Dear  [unfilled], [Courtesy Letter:] : I had the pleasure of seeing your patient, [unfilled], in my office today. [Referral Closing:] : Thank you very much for seeing this patient.  If you have any questions, please do not hesitate to contact me. [Sincerely,] : Sincerely, [FreeTextEntry3] : Andrew Pantoja [DrOrquidea  ___] : Dr. RECIO

## 2020-11-18 NOTE — RESULTS/DATA
[FreeTextEntry1] :  Result Name Results Units Reference Range \par      PET CT WHOLE BODY TOP OF SKULL TO TI   SEE TEXT       \par     Patient Name: MOR, AUGUST : 1950\par Patient ID: [id number]\par Account: [id number]\par Patient Location: [facility]\par Accession: [id number]\par Procedure: PET CT WHOLE BODY TOP OF SKULL TO TIP OF TOES SUBSEQUENT 250-0036\par Date of Exam: 2017 11:09 AM\par Attending Physician: JARVIS LOW\par Requesting Physician: JARVIS LOW MD\par Clinical History / Reason for exam: FDG PET CT STUDY:\par Reason for examination: Tumor imaging - PET with concurrently acquired CT for\par attenuation correction and anatomic localization;  top of the skull  to toes/\par [address]23 multiple myeloma, subsequent treatment strategy.\par ICD-10 code:C90.0\par History: 66-year-old male patient with history of multiple myeloma, last\par chemotherapy received was in 2017. Patient had bone biopsy on\par 2017.\par Blood glucose pre injection 102 mg/dL\par Technique:\par Approximately 45 minutes after the intravenous administration of 13.4  mCi\par 18-Fluorine FDG, whole body PET images were acquired from top of  the skull to\par toes. In addition, non-iv and non oral contrast, low dose, non - diagnostic CT\par was acquired for attenuation correction and anatomic correlation only.\par Findings:\par Comparison: Prior PET CT study done on July 3, 2015. Correlation was performed\par with the skeletal survey done on 2016.\par Head /Neck: Physiologic FDG uptake is seen in the visualized region of the\par brain, pharyngeal lymphoid tissue, and salivary glands.\par Chest:\par Physiologic FDG avid PET is seen in the mediastinal blood pool and myocardium.\par Chest wall: Unremarkable\par Lungs: No abnormal uptake.\par Mediastinum/Pleura/pericardium: No abnormal uptake.\par Thoracic/ axillary lymph nodes: No abnormal uptake.\par Hepatobiliary: Unremarkable.\par Gallbladder: Multiple gallstones.\par Spleen: No abnormal uptake\par Pancreas: No abnormal uptake.\par Adrenal glands: No abnormal uptake.\par Kidneys/ureters/bladder: Normal excretory activity is demonstrated.\par Abdominal pelvic lymph nodes: No abnormal uptake.\par Bowel/peritoneum/mesentery: No abnormal uptake.\par Pelvic organs: Unremarkable. No abnormal increased uptake. Prosthetic\par calcifications.\par Bones/soft tissues: Osteoarthritic changes are identified in the bones. \par Patient\par has multiple extensive lytic lesions in the skeletal, seen in the skeletal\par survey  for details see the report  on 2016.\par In the prior examination there was PET positive lytic lesion right fifth rib\par with a max SUV 2.6, now non-FDG avid   and L5 vertebral body on the left side\par with a max SUV 3.8, now non-FDG avid with multiple other scattered lytic\par lesions, non-FDG avid.\par No abnormal increased uptake is seen in the lower extremities.\par Impression:\par 1. No new areas of abnormal increased uptake is seen.\par 2. Previously seen abnormal increased uptake seen in the right fifth rib and \par L5\par vertebral body on the left side, at this time both regions are non-FDG avid.\par 3. Multiple extensive lytic lesions in the skeletal consistent with multiple\par myeloma.\par 4. No other areas of abnormal increased uptake is seen.\par I the attending attest that I have personally reviewed these images and agree\par with this report.\par \par  \par

## 2020-11-18 NOTE — REVIEW OF SYSTEMS
[Fever] : no fever [Chills] : no chills [Fatigue] : no fatigue [Dry Eyes] : no dryness of the eyes [Mucosal Pain] : no mucosal pain [Leg Claudication] : no intermittent leg claudication [Lower Ext Edema] : no lower extremity edema [SOB on Exertion] : no shortness of breath during exertion [Abdominal Pain] : no abdominal pain [Constipation] : no constipation [Muscle Pain] : no muscle pain [Negative] : Heme/Lymph

## 2020-11-18 NOTE — ASSESSMENT
[FreeTextEntry1] : #Free light chain multiple myeloma, specifically lambda.  Initial beta-2 was 27.1.  His bone marrow cytogenetics was normal.  He is status post four cycles of VCD.  This is followed by autologous bone marrow transplant in 07/2015,he has been on  weekly Velcade maintenance, He is also being followed by Dr. Krysta Rod in [place].  He currently takes his acyclovir.  Last myeloma panel  showed increasing lambda light chains but marrow showed minimal plasma cells with PET CT no progression.  HIs lambda continues to increase supporting biochemical recurrence with worsening creatinine  He was started on Daratumumab, Revlimid 5 mg daily d1-21 and  Dexamethasone  40 mg weekly PO with great response and is nos most likey in CR. Patient was taking Revlimid every other day and it was held for pancytopenia.  On 6/18/18 went on Revlimid maintenance . Achieved a VGPR/CR. Since 2/2019 Serum LUCIA is now positive thus Relapse from complete response,  but  no evidence of clinical relapse thus would keep going with Revlimid. If affected light chain begins to rise constantly  will check PET/CT possible repeat marrow and will consider change in therapy, multiple options such as restating Esthela and going to pomalidomide with Dex or Pom Dex and Slam7 inhibition or Esthela, carfilzomib and dex or  clinical trial if available  \par - c/w Revlimid 2.5 mg every other day d1-21 every 28 days (takes 10 tabs) \par - Myeloma panel resent, has been stable. Serum LUCIA negative\par - refuses colonoscopy \par \par #Chronic kidney disease from multiple myeloma.  He has  been on dialysis in the past but is now off.  \par - creatinine has been relatively stable.  He follows up with Dr. Allen at least twice a year.\par \par #Normocytic anemia, related to chronic kidney disease and Revlimid was on   Procrit  60,000 units every  other  week and s/p IV iron .  On hold again since Hgb >10 g/dL\par - will keep saturation over 20% and Ferritin over 100 if goes back on Procrit  \par \par 4. Moderate to severe   thrombocytopenia most likely from Revlimid. \par - Resolved\par \par 5. HTN \par - on Meds, will follow-up with his PCP for adjustment, stable today but systolic is still a bit high and he will reach out to Dr. Dooley\par \par RTC in   2 months if blood work is stable.

## 2020-11-18 NOTE — HISTORY OF PRESENT ILLNESS
[de-identified] : REASON FOR FOLLOWUP:  Maintenance Velcade for lambda light chain myeloma, status post autologous stem cell transplant.\par \par TREATMENT HISTORY:  On 05/02/2015, he was diagnosed with lambda light chain multiple myeloma when he had a syncopal episode in [place] and was found to be in acute renal failure.  He underwent a bone marrow biopsy that confirmed lambda light chain multiple myeloma.  His beta2 microglobulin was initially 27.1 on diagnosis.  His lambda light chain prior to treatment was as high as 17463.2 on 02/27/2015.  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  There was also diffuse acute tubular injury and modular tubular atrophy with interstitial fibrosis initially on dialysis, but currently been off dialysis for approximately one year.\par \par His treatment summary is as follows.  He initiated Velcade, Cytoxan, and dexamethasone, cycle started on 03/13/2015.  He completed four cycles on 05/08/2015.  He then underwent an autologous bone marrow transplant in 07/2015 at [facility].  He has been on maintenance Velcade but in 6/17 was started on DRD due to  rising lambda with normal kappa.\par \par Mr. Motta is a very pleasant 66yearold gentleman with history of lambda light chain multiple myeloma, treated as above.\par  [Therapy: ___] : Therapy: [unfilled] [FreeTextEntry1] : DRD started  on 6/21/17 went on  Revlimid maintenance 6/18/18 [de-identified] : He presents today for followup she is currently on maintenance Velcade.his blood work from October 27 demonstrated a stage a faint lambda band on urine immunofixation. Nomi a count 7.66 hemoglobin 8.3 platelet count of 132. The SPEP was normal. Creatinine was stable at 5.93 potassium was 5.2 total protein 5.9 unremarkable LFTs free kappa lambda ratio normal 1.27 with free kappa at 42 and free lambda at 33.1. Most recent blood work from 1110 2016 shows a hemoglobin of 7.8. \par \par he was recently seen by Dr. Rod in  [place]. He had blood work done and reports that everything was stable. Dr. Rod also recommended to decrease his Velcade dose to 1.3 mg/m2\par \par He has no complaints. He denies any neuropathy.\par \par 1/4/17\par Presents for follow up today. Doing well on Maintenance Velcade.  No complaints. No neuropathy. \par \par 2/1/17\par He presents for follow up today. He has no complaints. Tolerating Velcade well.\par \par 4/5/17\par Doing well No complaints blood work from last visit was stable.\par \par 5/1/17\par He presents for follow up. He had increasing lambda light chains. Repeat was stable. A bone marrow showed minimal plasma cells and PET CT did not show any new activity.  He has no complaints.\par \par 6/5/2017\par Presents for follow up. He is doing well. He has some fatigue. His  Lambda has been slowly increasing. He has no other complaints.  No neuropathy\par \par 6/12/17\par He was brought back to discuss his lab work. His Lambda has been increasing with stable Kappa as well as slight worsening of his of creatinine He has no complaints.\par \par 7/12/17\par He has been on DRD. His HgB dropped to 5.9 and he is s/p PRBC transfusion.  He finished Revlimid on 7/9. His only complaint is leg crams at night. \par \par 8/9/17\par He is here for follow up. His cramps went away with the lower dose of Revlimid. His His lambda is coming down. He saw Dr. Rod last week as per patient.\par \par 8/30/27\par He is doing well. His legs cramps are better. He has severe anemia but he is asymptomatic. HIs creatinine is better and his light chain is at baseline.\par \par 9/27/17\par He is here for follow up. He needed  blood transfusion and hgb has been stable at 8 since than. He is also status post Venofer x2. He is on Procrit\par  40,000 units every 2 weeks. He feels well otherwise. \par \par 11/20/17\par Patient feels well and has no complaints. States he was taking Revlimid 5mg q48hrs prior to stopping for low counts. Patient will get Procrit and Darzalex. Patient will be restarted on Revilimid 2.5mg. Patient will continue every 2week Darzalex until week 25 then it becomes every 4 weeks. \par \par 12/18/17\par He is here for follow up. He is doing well. Blood work from last month still in CR. Cr and counts are better. He has no complaints and feel great.\par \par 1/22/18\par He is here for follow up. He is recovering from a cold. He is taking a prednisone taper given by his PMD. Had mules pain from cough.\par \par 2/26/18\par He is doing well. He has no complaints. His labs have been stable his light chain only went up slightly but ratio is normal. \par \par 3/27/18\par He is here for follow up he is doing well. He had blood work in [facility]  where he saw Dr. Rod 3/26/18 WBC 6.7, HgB 11.3, Pts 140, LFT WNL, , Ca 9.7. Cr. 5.96, alb 3.2\par \par He feels well. \par \par 4/23/18\par No events feels well. No complaints\par \par 5/21/18\par He is here for follow up. Doing well. Has no complaints\par \par 6/18/18\par His hgb has been dropping on monthly Procrit, His ligh chains are normal last Serum LUCIA from april 23 showed IgG kappa band thus he achieved aVGPR for DRD. He has no complaints.\par \par 7/16/18\par He is here for follow up. Doing well. No complaints. His kitten bit him.\par \par 8/13/18\par He is doing well. No complains. His HGb has been over 11 gm/dl So we are holding his procrit.\par \par 9/10/18\par He is here for follow up. He Myleoma panel is stable. Hgb is stable. He is due for his vaccinations 2 years post Auto and will have them today. He started Revlimid about 1 week ago. He has no complaints.\par \par 10/10/18\par He is here for follow up. He is doing well. BP is a bit high will watch. He has no complaints. He has his revlamid.\par \par 11/7/18\par He is here for follow up. His last hgb was under 10 gm/dl.  He is doing well otherwise. Was started on something for his blood pressure by Dr. Allen but he does not know what it is. He has his Revlimid\par \par 12/10/18\par Patient is here for a follow-up visit.  He is feeling well with no complaints.  Most recent CBC is stable.  Patient denies fever, chills, nausea, vomiting.  The light chains are picking up but it may be related to the CKD since LUCIA is negative and ratio is normal.\par \par 1/21/19\par He is here for follow-up with his wife.  His Kappa+Lambda have slightly risen, but the ratio is stable, so this is likely due to his CKD.   Most recent CBC is stable.  He received a prescription for Revlimid 5mg so he has been taking them every other day.  He is otherwise feeling well.  The rest of the CRAB criteria remains the same.  \par \par 2/25/19\par He is here with his wife.  As both his light chains are increasing, it is likely it is from his renal failure.  His most recent Hgb is 10.8g/dL, so we may stop when he nears 11.0g/dL.  He will see Dr. Rod on 3/25/19.   \par \par 3/20/19\par He is here for follow-up of MM with his wife.  His Hgb is stable at 11.3g/dL.  His BP is slightly elevated today. He will speak with his PCP.  He is otherwise feeling well.\par \par 4/22/19\par As of February, the immunofixation is picking up weak IgG Kappa bands no CRAB criteria  or significant rise in paraprotein that suggests progression .  We will continue with Revlimid at this time.   They went to see Dr. Rod from [facility] last month.   He feels well otherwise.\par \par 5/20/19\par He is here for follow up. He is doing well. His HgB is coming down so we will restart Procrit today. MM panel from March stable.\par \par 6/17/19\par He is here for follow-up of MM with his wife.  The MM panel from May was stable.  His hgb is 11.8g/dL today, so he does not need procrit.  He is feeling well. \par \par 7/15/19\par He is here for follow up. His  Myeloma panel from June is stable, Serum LUCIA remains negative.\par \par 8/12/19\par He is here for follow up. paraproteins from July are stable with negative serum LUCIA. His HgB is 10.9. he has no complaints. \par \par 9/16/19\par He is here for follow up. On day 4 of Revlimid. No complaints. HGB is stable. Serologically still in CR.\par \par 10/21/19\par he is here for follow up. No deviance of progression on last labs. he feels well.  On his off week of Revlimid. No complaints. to get his flu shot and pneumococcal vaccine with PMD next week.\par \par 11/18/19\par He is here for follow up of MM.  He is feeling well today.  He is seated with his wife.  He denies any bony pain or weight loss.  This is his off week for Revlimid.  No new complaints.  He is due to  start new cycle of Revlimid next Monday.  Hgb has improved to 11g/dL so we will hold procrit for now.  \par \par 12/23/19\par Patient is here for a follow-up visit for myeloma.  He is feeling well with no new complaints, tolerating revlimid 2.5 every other day.  This is his D2 of the next cycle of Revlimid, no refill needed at this time.  Most recent CBC is stable with hgb 11.2g/dL.  Patient denies fever, chills, nausea, vomiting, new pain or bleeding.  We will continue to hold Procrit for now.  \par \par 1/20/2020\par Patient is here for a follow-up visit for myeloma accompanied by his spouse.  He is feeling well with no new complaints, tolerating revlimid 2.5 every other day.  He has just begun the next cycle of Revlimid, no refill needed at this time.  Most recent CBC is stable with hgb 11.5g/dL.  Patient denies fever, chills, nausea, vomiting, new bony pain or bleeding.  We will continue to hold Procrit for now as his hgb is at target.  \par \par 2/24/2020\par Patient is here for a follow-up visit for myeloma accompanied by his spouse.   He is feeling well with no new complaints, tolerating revlimid 2.5 every other day.   Most recent CBC with hgb 10.8g/dL.  Myeloma panel from 01/2020 is stable with no quantifiable M-spike.  He is approaching week off of Revlimid and requesting refill.  \par \par 4/20/2020\par Patient is here for a follow-up visit for multiple myeloma.   He is feeling well with no new complaints, tolerating revlimid 2.5 every other day.   Most recent CBC with hgb 10.2g/dL.  Myeloma panel from 01/2020 remains with no quantifiable M-spike.   He has no complaints.\par \par \par 5/18/20\par He is here for follow up. he feels well. On revlamid. His last myeloma panel is without progression. His hgb is over 10 today.\par \par 7/20/20\par He is doing well he has no complaints. CBC showed stable hgb at 10.7. \par \par \par 8/31/20\par He is here for follow up. He is doing wel.. No isseus with Revlimid. Hgb remains over 10.  Myeloma labs remain unchanged with negative  serum LUCIA.\par \par \par 9/21/20\par He is here for follow up. His last Myeloma panel was unchanged bala still in CR SIFE is negative  CBC is stable.  He is on revlmaid just recently started this cycle. \par \par 11/18/20\par He is here for follow up. Doing well. No complinats. His BP meds were just adjusted by Dr. Allen. On revlamid and reports no side effects. Limited exam today due to COVID 19

## 2020-11-20 LAB
ALBUMIN MFR SERPL ELPH: 56.7 %
ALBUMIN SERPL-MCNC: 3.7 G/DL
ALBUMIN/GLOB SERPL: 1.3 RATIO
ALPHA1 GLOB MFR SERPL ELPH: 5.8 %
ALPHA1 GLOB SERPL ELPH-MCNC: 0.4 G/DL
ALPHA2 GLOB MFR SERPL ELPH: 12.7 %
ALPHA2 GLOB SERPL ELPH-MCNC: 0.8 G/DL
B-GLOBULIN MFR SERPL ELPH: 11.5 %
B-GLOBULIN SERPL ELPH-MCNC: 0.7 G/DL
DEPRECATED KAPPA LC FREE/LAMBDA SER: 0.13 RATIO
GAMMA GLOB FLD ELPH-MCNC: 0.9 G/DL
GAMMA GLOB MFR SERPL ELPH: 13.3 %
IGA SER QL IEP: 205 MG/DL
IGG SER QL IEP: 975 MG/DL
IGM SER QL IEP: 34 MG/DL
INTERPRETATION SERPL IEP-IMP: NORMAL
KAPPA LC CSF-MCNC: 70.08 MG/DL
KAPPA LC SERPL-MCNC: 8.95 MG/DL
M PROTEIN SPEC IFE-MCNC: NORMAL
PROT SERPL-MCNC: 6.5 G/DL
PROT SERPL-MCNC: 6.5 G/DL

## 2020-11-23 ENCOUNTER — NON-APPOINTMENT (OUTPATIENT)
Age: 70
End: 2020-11-23

## 2020-12-02 DIAGNOSIS — N18.9 CHRONIC KIDNEY DISEASE, UNSPECIFIED: ICD-10-CM

## 2020-12-23 ENCOUNTER — NON-APPOINTMENT (OUTPATIENT)
Age: 70
End: 2020-12-23

## 2021-01-20 ENCOUNTER — NON-APPOINTMENT (OUTPATIENT)
Age: 71
End: 2021-01-20

## 2021-02-08 ENCOUNTER — LABORATORY RESULT (OUTPATIENT)
Age: 71
End: 2021-02-08

## 2021-02-08 ENCOUNTER — NON-APPOINTMENT (OUTPATIENT)
Age: 71
End: 2021-02-08

## 2021-02-08 ENCOUNTER — APPOINTMENT (OUTPATIENT)
Dept: HEMATOLOGY ONCOLOGY | Facility: CLINIC | Age: 71
End: 2021-02-08
Payer: MEDICARE

## 2021-02-08 LAB
ALBUMIN SERPL ELPH-MCNC: 4.1 G/DL
ALP BLD-CCNC: 84 U/L
ALT SERPL-CCNC: 11 U/L
ANION GAP SERPL CALC-SCNC: 16 MMOL/L
AST SERPL-CCNC: 11 U/L
BILIRUB SERPL-MCNC: <0.2 MG/DL
BUN SERPL-MCNC: 54 MG/DL
CALCIUM SERPL-MCNC: 9.6 MG/DL
CHLORIDE SERPL-SCNC: 108 MMOL/L
CO2 SERPL-SCNC: 18 MMOL/L
CREAT SERPL-MCNC: 6 MG/DL
GLUCOSE SERPL-MCNC: 100 MG/DL
HCT VFR BLD CALC: 35.1 %
HGB BLD-MCNC: 11.2 G/DL
MAGNESIUM SERPL-MCNC: 2 MG/DL
MCHC RBC-ENTMCNC: 25.1 PG
MCHC RBC-ENTMCNC: 31.9 G/DL
MCV RBC AUTO: 78.7 FL
PHOSPHATE SERPL-MCNC: 4.7 MG/DL
PLATELET # BLD AUTO: 197 K/UL
PMV BLD: 9.4 FL
POTASSIUM SERPL-SCNC: 4.2 MMOL/L
PROT SERPL-MCNC: 6.2 G/DL
RBC # BLD: 4.46 M/UL
RBC # FLD: 17.7 %
SODIUM SERPL-SCNC: 142 MMOL/L
WBC # FLD AUTO: 6.17 K/UL

## 2021-02-08 PROCEDURE — 99213 OFFICE O/P EST LOW 20 MIN: CPT

## 2021-02-08 NOTE — RESULTS/DATA
[FreeTextEntry1] :  Result Name Results Units Reference Range \par      PET CT WHOLE BODY TOP OF SKULL TO TI   SEE TEXT       \par     Patient Name: MOR, AUGUST : 1950\par Patient ID: [id number]\par Account: [id number]\par Patient Location: [facility]\par Accession: [id number]\par Procedure: PET CT WHOLE BODY TOP OF SKULL TO TIP OF TOES SUBSEQUENT [id number]\par Date of Exam: 2017 11:09 AM\par Attending Physician: JARVIS LOW\par Requesting Physician: JARVIS LOW MD\par Clinical History / Reason for exam: FDG PET CT STUDY:\par Reason for examination: Tumor imaging - PET with concurrently acquired CT for\par attenuation correction and anatomic localization;  top of the skull  to toes/\par [address] multiple myeloma, subsequent treatment strategy.\par ICD-10 code:C90.0\par History: 66-year-old male patient with history of multiple myeloma, last\par chemotherapy received was in 2017. Patient had bone biopsy on\par 2017.\par Blood glucose pre injection 102 mg/dL\par Technique:\par Approximately 45 minutes after the intravenous administration of 13.4  mCi\par 18-Fluorine FDG, whole body PET images were acquired from top of  the skull to\par toes. In addition, non-iv and non oral contrast, low dose, non - diagnostic CT\par was acquired for attenuation correction and anatomic correlation only.\par Findings:\par Comparison: Prior PET CT study done on July 3, 2015. Correlation was performed\par with the skeletal survey done on 2016.\par Head /Neck: Physiologic FDG uptake is seen in the visualized region of the\par brain, pharyngeal lymphoid tissue, and salivary glands.\par Chest:\par Physiologic FDG avid PET is seen in the mediastinal blood pool and myocardium.\par Chest wall: Unremarkable\par Lungs: No abnormal uptake.\par Mediastinum/Pleura/pericardium: No abnormal uptake.\par Thoracic/ axillary lymph nodes: No abnormal uptake.\par Hepatobiliary: Unremarkable.\par Gallbladder: Multiple gallstones.\par Spleen: No abnormal uptake\par Pancreas: No abnormal uptake.\par Adrenal glands: No abnormal uptake.\par Kidneys/ureters/bladder: Normal excretory activity is demonstrated.\par Abdominal pelvic lymph nodes: No abnormal uptake.\par Bowel/peritoneum/mesentery: No abnormal uptake.\par Pelvic organs: Unremarkable. No abnormal increased uptake. Prosthetic\par calcifications.\par Bones/soft tissues: Osteoarthritic changes are identified in the bones. \par Patient\par has multiple extensive lytic lesions in the skeletal, seen in the skeletal\par survey  for details see the report  on 2016.\par In the prior examination there was PET positive lytic lesion right fifth rib\par with a max SUV 2.6, now non-FDG avid   and L5 vertebral body on the left side\par with a max SUV 3.8, now non-FDG avid with multiple other scattered lytic\par lesions, non-FDG avid.\par No abnormal increased uptake is seen in the lower extremities.\par Impression:\par 1. No new areas of abnormal increased uptake is seen.\par 2. Previously seen abnormal increased uptake seen in the right fifth rib and \par L5\par vertebral body on the left side, at this time both regions are non-FDG avid.\par 3. Multiple extensive lytic lesions in the skeletal consistent with multiple\par myeloma.\par 4. No other areas of abnormal increased uptake is seen.\par I the attending attest that I have personally reviewed these images and agree\par with this report.\par \par  \par

## 2021-02-08 NOTE — HISTORY OF PRESENT ILLNESS
[Therapy: ___] : Therapy: [unfilled] [FreeTextEntry1] : DRD started  on 6/21/17 went on  Revlimid maintenance 6/18/18 [de-identified] : He presents today for followup she is currently on maintenance Velcade.his blood work from October 27 demonstrated a stage a faint lambda band on urine immunofixation. Nomi a count 7.66 hemoglobin 8.3 platelet count of 132. The SPEP was normal. Creatinine was stable at 5.93 potassium was 5.2 total protein 5.9 unremarkable LFTs free kappa lambda ratio normal 1.27 with free kappa at 42 and free lambda at 33.1. Most recent blood work from 1110 2016 shows a hemoglobin of 7.8. \par \par he was recently seen by Dr. Rod in  [place]. He had blood work done and reports that everything was stable. Dr. Rod also recommended to decrease his Velcade dose to 1.3 mg/m2\par \par He has no complaints. He denies any neuropathy.\par \par 1/4/17\par Presents for follow up today. Doing well on Maintenance Velcade.  No complaints. No neuropathy. \par \par 2/1/17\par He presents for follow up today. He has no complaints. Tolerating Velcade well.\par \par 4/5/17\par Doing well No complaints blood work from last visit was stable.\par \par 5/1/17\par He presents for follow up. He had increasing lambda light chains. Repeat was stable. A bone marrow showed minimal plasma cells and PET CT did not show any new activity.  He has no complaints.\par \par 6/5/2017\par Presents for follow up. He is doing well. He has some fatigue. His  Lambda has been slowly increasing. He has no other complaints.  No neuropathy\par \par 6/12/17\par He was brought back to discuss his lab work. His Lambda has been increasing with stable Kappa as well as slight worsening of his of creatinine He has no complaints.\par \par 7/12/17\par He has been on DRD. His HgB dropped to 5.9 and he is s/p PRBC transfusion.  He finished Revlimid on 7/9. His only complaint is leg crams at night. \par \par 8/9/17\par He is here for follow up. His cramps went away with the lower dose of Revlimid. His His lambda is coming down. He saw Dr. Rod last week as per patient.\par \par 8/30/27\par He is doing well. His legs cramps are better. He has severe anemia but he is asymptomatic. HIs creatinine is better and his light chain is at baseline.\par \par 9/27/17\par He is here for follow up. He needed  blood transfusion and hgb has been stable at 8 since than. He is also status post Venofer x2. He is on Procrit\par  40,000 units every 2 weeks. He feels well otherwise. \par \par 11/20/17\par Patient feels well and has no complaints. States he was taking Revlimid 5mg q48hrs prior to stopping for low counts. Patient will get Procrit and Darzalex. Patient will be restarted on Revilimid 2.5mg. Patient will continue every 2week Darzalex until week 25 then it becomes every 4 weeks. \par \par 12/18/17\par He is here for follow up. He is doing well. Blood work from last month still in CR. Cr and counts are better. He has no complaints and feel great.\par \par 1/22/18\par He is here for follow up. He is recovering from a cold. He is taking a prednisone taper given by his PMD. Had mules pain from cough.\par \par 2/26/18\par He is doing well. He has no complaints. His labs have been stable his light chain only went up slightly but ratio is normal. \par \par 3/27/18\par He is here for follow up he is doing well. He had blood work in [facility]  where he saw Dr. Rod 3/26/18 WBC 6.7, HgB 11.3, Pts 140, LFT WNL, , Ca 9.7. Cr. 5.96, alb 3.2\par \par He feels well. \par \par 4/23/18\par No events feels well. No complaints\par \par 5/21/18\par He is here for follow up. Doing well. Has no complaints\par \par 6/18/18\par His hgb has been dropping on monthly Procrit, His ligh chains are normal last Serum LUCIA from april 23 showed IgG kappa band thus he achieved aVGPR for DRD. He has no complaints.\par \par 7/16/18\par He is here for follow up. Doing well. No complaints. His kitten bit him.\par \par 8/13/18\par He is doing well. No complains. His HGb has been over 11 gm/dl So we are holding his procrit.\par \par 9/10/18\par He is here for follow up. He Myleoma panel is stable. Hgb is stable. He is due for his vaccinations 2 years post Auto and will have them today. He started Revlimid about 1 week ago. He has no complaints.\par \par 10/10/18\par He is here for follow up. He is doing well. BP is a bit high will watch. He has no complaints. He has his revlamid.\par \par 11/7/18\par He is here for follow up. His last hgb was under 10 gm/dl.  He is doing well otherwise. Was started on something for his blood pressure by Dr. Allen but he does not know what it is. He has his Revlimid\par \par 12/10/18\par Patient is here for a follow-up visit.  He is feeling well with no complaints.  Most recent CBC is stable.  Patient denies fever, chills, nausea, vomiting.  The light chains are picking up but it may be related to the CKD since LUCIA is negative and ratio is normal.\par \par 1/21/19\par He is here for follow-up with his wife.  His Kappa+Lambda have slightly risen, but the ratio is stable, so this is likely due to his CKD.   Most recent CBC is stable.  He received a prescription for Revlimid 5mg so he has been taking them every other day.  He is otherwise feeling well.  The rest of the CRAB criteria remains the same.  \par \par 2/25/19\par He is here with his wife.  As both his light chains are increasing, it is likely it is from his renal failure.  His most recent Hgb is 10.8g/dL, so we may stop when he nears 11.0g/dL.  He will see Dr. Rod on 3/25/19.   \par \par 3/20/19\par He is here for follow-up of MM with his wife.  His Hgb is stable at 11.3g/dL.  His BP is slightly elevated today. He will speak with his PCP.  He is otherwise feeling well.\par \par 4/22/19\par As of February, the immunofixation is picking up weak IgG Kappa bands no CRAB criteria  or significant rise in paraprotein that suggests progression .  We will continue with Revlimid at this time.   They went to see Dr. Rod from [facility] last month.   He feels well otherwise.\par \par 5/20/19\par He is here for follow up. He is doing well. His HgB is coming down so we will restart Procrit today. MM panel from March stable.\par \par 6/17/19\par He is here for follow-up of MM with his wife.  The MM panel from May was stable.  His hgb is 11.8g/dL today, so he does not need procrit.  He is feeling well. \par \par 7/15/19\par He is here for follow up. His  Myeloma panel from June is stable, Serum LUCIA remains negative.\par \par 8/12/19\par He is here for follow up. paraproteins from July are stable with negative serum LUCIA. His HgB is 10.9. he has no complaints. \par \par 9/16/19\par He is here for follow up. On day 4 of Revlimid. No complaints. HGB is stable. Serologically still in CR.\par \par 10/21/19\par he is here for follow up. No deviance of progression on last labs. he feels well.  On his off week of Revlimid. No complaints. to get his flu shot and pneumococcal vaccine with PMD next week.\par \par 11/18/19\par He is here for follow up of MM.  He is feeling well today.  He is seated with his wife.  He denies any bony pain or weight loss.  This is his off week for Revlimid.  No new complaints.  He is due to  start new cycle of Revlimid next Monday.  Hgb has improved to 11g/dL so we will hold procrit for now.  \par \par 12/23/19\par Patient is here for a follow-up visit for myeloma.  He is feeling well with no new complaints, tolerating revlimid 2.5 every other day.  This is his D2 of the next cycle of Revlimid, no refill needed at this time.  Most recent CBC is stable with hgb 11.2g/dL.  Patient denies fever, chills, nausea, vomiting, new pain or bleeding.  We will continue to hold Procrit for now.  \par \par 1/20/2020\par Patient is here for a follow-up visit for myeloma accompanied by his spouse.  He is feeling well with no new complaints, tolerating revlimid 2.5 every other day.  He has just begun the next cycle of Revlimid, no refill needed at this time.  Most recent CBC is stable with hgb 11.5g/dL.  Patient denies fever, chills, nausea, vomiting, new bony pain or bleeding.  We will continue to hold Procrit for now as his hgb is at target.  \par \par 2/24/2020\par Patient is here for a follow-up visit for myeloma accompanied by his spouse.   He is feeling well with no new complaints, tolerating revlimid 2.5 every other day.   Most recent CBC with hgb 10.8g/dL.  Myeloma panel from 01/2020 is stable with no quantifiable M-spike.  He is approaching week off of Revlimid and requesting refill.  \par \par 4/20/2020\par Patient is here for a follow-up visit for multiple myeloma.   He is feeling well with no new complaints, tolerating revlimid 2.5 every other day.   Most recent CBC with hgb 10.2g/dL.  Myeloma panel from 01/2020 remains with no quantifiable M-spike.   He has no complaints.\par \par \par 5/18/20\par He is here for follow up. he feels well. On revlamid. His last myeloma panel is without progression. His hgb is over 10 today.\par \par 7/20/20\par He is doing well he has no complaints. CBC showed stable hgb at 10.7. \par \par \par 8/31/20\par He is here for follow up. He is doing wel.. No isseus with Revlimid. Hgb remains over 10.  Myeloma labs remain unchanged with negative  serum LUCIA.\par \par \par 9/21/20\par He is here for follow up. His last Myeloma panel was unchanged bala still in CR SIFE is negative  CBC is stable.  He is on revlmaid just recently started this cycle. \par \par 11/18/20\par He is here for follow up. Doing well. No complinats. His BP meds were just adjusted by Dr. Allen. On revlamid and reports no side effects. Limited exam today due to COVID 19\par \par 2/8/2021: The patient is here for follow up for his multiple myeloma .His last blood work showed increasing serum light chain. This was in November. He feels great and shoveled snow. His CBC from today is stable. Mild anemia. He is on his second week of Revlimid.  [de-identified] : REASON FOR FOLLOWUP:  Maintenance Velcade for lambda light chain myeloma, status post autologous stem cell transplant.\par \par TREATMENT HISTORY:  On 05/02/2015, he was diagnosed with lambda light chain multiple myeloma when he had a syncopal episode in [place] and was found to be in acute renal failure.  He underwent a bone marrow biopsy that confirmed lambda light chain multiple myeloma.  His beta2 microglobulin was initially 27.1 on diagnosis.  His lambda light chain prior to treatment was as high as 95072.2 on 02/27/2015.  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  There was also diffuse acute tubular injury and modular tubular atrophy with interstitial fibrosis initially on dialysis, but currently been off dialysis for approximately one year.\par \par His treatment summary is as follows.  He initiated Velcade, Cytoxan, and dexamethasone, cycle started on 03/13/2015.  He completed four cycles on 05/08/2015.  He then underwent an autologous bone marrow transplant in 07/2015 at [place].  He has been on maintenance Velcade but in 6/17 was started on DRD due to  rising lambda with normal kappa.\par \par Mr. Motta is a very pleasant 66yearold gentleman with history of lambda light chain multiple myeloma, treated as above.\par

## 2021-02-08 NOTE — REVIEW OF SYSTEMS
[Negative] : Heme/Lymph [Fever] : no fever [Chills] : no chills [Fatigue] : no fatigue [Dry Eyes] : no dryness of the eyes [Leg Claudication] : no intermittent leg claudication [Mucosal Pain] : no mucosal pain [Lower Ext Edema] : no lower extremity edema [SOB on Exertion] : no shortness of breath during exertion [Abdominal Pain] : no abdominal pain [Constipation] : no constipation [Muscle Pain] : no muscle pain

## 2021-02-08 NOTE — ASSESSMENT
[FreeTextEntry1] : #Free light chain multiple myeloma, specifically lambda.  Initial beta-2 was 27.1.  His bone marrow cytogenetics was normal.  He is status post four cycles of VCD.  This is followed by autologous bone marrow transplant in 07/2015,he has been on  weekly Velcade maintenance, He is also being followed by Dr. Krysta Rod in [place].  He currently takes his acyclovir.  Last myeloma panel  showed increasing lambda light chains but marrow showed minimal plasma cells with PET CT no progression.  HIs lambda continues to increase supporting biochemical recurrence with worsening creatinine  He was started on Daratumumab, Revlimid 5 mg daily d1-21 and  Dexamethasone  40 mg weekly PO with great response and is nos most likey in CR. Patient was taking Revlimid every other day and it was held for pancytopenia.  On 6/18/18 went on Revlimid maintenance . Achieved a VGPR/CR. His Lambda has been going up but LUCIA remains negative. \par - hold revmalid me may be progressing \par - Myeloma panel resent,  with urine. will also check Mg, PTH, Vit D, CMP \par =will check PET/CT and will consider a bone marrow biopsy as well \par -RTC in one weeks if progressing will start Esthela, Pom Dex or Esthela, Carfilzomib and dex\par - refuses colonoscopy \par \par #Chronic kidney disease from multiple myeloma.  He has  been on dialysis in the past but is now off.  \par - creatinine has been relatively stable.  He follows up with Dr. Allen at least twice a year.\par \par #Normocytic anemia, related to chronic kidney disease and Revlimid was on   Procrit  60,000 units every  other  week and s/p IV iron .  On hold again since Hgb >10 g/dL\par - will keep saturation over 20% and Ferritin over 100 if goes back on Procrit  \par \par 4. Moderate to severe   thrombocytopenia most likely from Revlimid. \par - Resolved\par \par 5. HTN \par - on Meds, will follow-up with his PCP for adjustment, stable today but systolic is still a bit high and he will reach out to Dr. Dooley\par \par RTC in  1  week

## 2021-02-08 NOTE — END OF VISIT
--------------  ADMISSION REVIEW     Payor: [employer]  Subscriber #:  [id number]  Authorization Number: [id number]    Admit date: 3/31/18  Admit time: 2131       Admitting Physician: Jamia Davis MD  Attending Physician:  Walter Gomez MD and reactive to light. Neck: Normal range of motion. Neck supple. No carotid bruit on auscultation. Cardiovascular: Normal rate, regular rhythm and intact and equal distal pulses. No obvious murmur.   Pulmonary/Chest: Effort normal. No respiratory dist 4.5 x 5.2 x 4.1 cm (transverse, AP, CC). There is marked  effacement of the subjacent sulci with some perilesional edema. The mass approaches but does not contact the falx cerebri. There is mild right to left midline shift of approximately 2 mm.  There is s increased central pulmonary vascular markings. PLEURA: No effusion on a single view. No pneumothorax. BONES: No visible acute osseous lesion. Partially imaged lower cervical fusion plate.       CONCLUSION:   Findings that could relate to central pulmonary dipropionate (DIPROSONE) 0.05 % Apply Externally Cream Apply 1 Application topically 2 (two) times daily. Review of Systems:     Eyes: Positive for visual disturbance. Respiratory: Positive for wheezing. Cardiovascular: Negative.     Neurological based extra-axial mass along the high right parietal convexity. There is mild resultant mass effect upon the right frontal lobe with minimal perilesional edema. Notably, there are mildly prominent vessels within the lesion.  Meningioma is the favored differ Given 1 puff Inhalation Klarissa Sauceda RCP      Gadoterate Meglumine (DOTAREM) 7.5 MMOL/15ML injection 15 mL     Date Action Dose Route User    4/2/2018 0843 Given 15 mL Intravenous Marilyne Jhonatan H      ipratropium-albuterol (DUONEB) nebulizer solut [] : Fellow the right hemisphere. He was subsequently admitted for further evaluation. He feels over the past several weeks he has been more forgetful. He had some difficulty figuring out how to do a task at work, which is unusual for him.   He denies headache or recommended a craniotomy for resection of this tumor. I have gone over the risks of surgery including seizures, hemorrhage, paralysis, sensory abnormalities, or other neurologic problem. They understand and wish to proceed.   I have told the patient that

## 2021-02-09 ENCOUNTER — LABORATORY RESULT (OUTPATIENT)
Age: 71
End: 2021-02-09

## 2021-02-09 LAB
24R-OH-CALCIDIOL SERPL-MCNC: 19.6 PG/ML
25(OH)D3 SERPL-MCNC: 39 NG/ML
APPEARANCE: CLEAR
BILIRUBIN URINE: NEGATIVE
BLOOD URINE: NEGATIVE
CALCIUM SERPL-MCNC: 9.6 MG/DL
COLOR: COLORLESS
GLUCOSE QUALITATIVE U: NEGATIVE
KETONES URINE: NEGATIVE
LEUKOCYTE ESTERASE URINE: NEGATIVE
NITRITE URINE: NEGATIVE
PARATHYROID HORMONE INTACT: 106 PG/ML
PH URINE: 6
PROTEIN URINE: ABNORMAL
SPECIFIC GRAVITY URINE: 1.01
UROBILINOGEN URINE: NORMAL

## 2021-02-16 ENCOUNTER — NON-APPOINTMENT (OUTPATIENT)
Age: 71
End: 2021-02-16

## 2021-02-17 ENCOUNTER — NON-APPOINTMENT (OUTPATIENT)
Age: 71
End: 2021-02-17

## 2021-02-17 ENCOUNTER — LABORATORY RESULT (OUTPATIENT)
Age: 71
End: 2021-02-17

## 2021-02-17 ENCOUNTER — APPOINTMENT (OUTPATIENT)
Dept: HEMATOLOGY ONCOLOGY | Facility: CLINIC | Age: 71
End: 2021-02-17
Payer: MEDICARE

## 2021-02-17 VITALS
TEMPERATURE: 98.1 F | HEART RATE: 79 BPM | RESPIRATION RATE: 14 BRPM | HEIGHT: 66 IN | DIASTOLIC BLOOD PRESSURE: 77 MMHG | SYSTOLIC BLOOD PRESSURE: 172 MMHG | WEIGHT: 159 LBS | BODY MASS INDEX: 25.55 KG/M2

## 2021-02-17 LAB
ALBUMIN MFR SERPL ELPH: 58.4 %
ALBUMIN SERPL ELPH-MCNC: 3.9 G/DL
ALBUMIN SERPL-MCNC: 3.9 G/DL
ALBUMIN/GLOB SERPL: 1.4 RATIO
ALBUPE: 6.2 %
ALKPISO INTERP: NORMAL
ALP BLD-CCNC: 75 U/L
ALP BLD-CCNC: 91 U/L
ALP BONE CFR SERPL: 28 %
ALP INTEST CFR SERPL: 3 %
ALP LIVER CFR SERPL: 69 %
ALP MACRO CFR SERPL: 0 %
ALP PLAC CFR SERPL: 0 %
ALPHA1 GLOB MFR SERPL ELPH: 5.2 %
ALPHA1 GLOB SERPL ELPH-MCNC: 0.3 G/DL
ALPHA1UPE: 13.3 %
ALPHA2 GLOB MFR SERPL ELPH: 12.3 %
ALPHA2 GLOB SERPL ELPH-MCNC: 0.8 G/DL
ALPHA2UPE: 16.8 %
ALT SERPL-CCNC: 11 U/L
ANION GAP SERPL CALC-SCNC: 15 MMOL/L
AST SERPL-CCNC: 11 U/L
B-GLOBULIN MFR SERPL ELPH: 11.9 %
B-GLOBULIN SERPL ELPH-MCNC: 0.8 G/DL
BETAUPE: 57.7 %
BILIRUB SERPL-MCNC: <0.2 MG/DL
BUN SERPL-MCNC: 64 MG/DL
CALCIUM SERPL-MCNC: 9 MG/DL
CHLORIDE SERPL-SCNC: 109 MMOL/L
CO2 SERPL-SCNC: 17 MMOL/L
CREAT 24H UR-MCNC: NORMAL G/24 H
CREAT SERPL-MCNC: 5.4 MG/DL
CREATININE UR (MAYO): 82 MG/DL
DEPRECATED KAPPA LC FREE/LAMBDA SER: 0.03 RATIO
GAMMA GLOB FLD ELPH-MCNC: 0.8 G/DL
GAMMA GLOB MFR SERPL ELPH: 12.2 %
GAMMAUPE: 6 %
GLUCOSE SERPL-MCNC: 98 MG/DL
HCT VFR BLD CALC: 34.9 %
HGB BLD-MCNC: 11 G/DL
IGA 24H UR QL IFE: NORMAL
IGA SER QL IEP: 158 MG/DL
IGG SER QL IEP: 903 MG/DL
IGM SER QL IEP: 21 MG/DL
INTERPRETATION SERPL IEP-IMP: NORMAL
KAPPA LC 24H UR QL: PRESENT
KAPPA LC CSF-MCNC: 254.12 MG/DL
KAPPA LC SERPL-MCNC: 6.71 MG/DL
M PROTEIN MFR SERPL ELPH: NORMAL
M PROTEIN SPEC IFE-MCNC: NORMAL
MCHC RBC-ENTMCNC: 24.9 PG
MCHC RBC-ENTMCNC: 31.5 G/DL
MCV RBC AUTO: 79 FL
MONOCLON BAND OBS SERPL: NORMAL
PLATELET # BLD AUTO: 180 K/UL
PMV BLD: 9.7 FL
POTASSIUM SERPL-SCNC: 4.6 MMOL/L
PROT PATTERN 24H UR ELPH-IMP: NORMAL
PROT SERPL-MCNC: 6.4 G/DL
PROT SERPL-MCNC: 6.6 G/DL
PROT SERPL-MCNC: 6.6 G/DL
PROT UR-MCNC: 30 MG/DL
PROT UR-MCNC: 30 MG/DL
RBC # BLD: 4.42 M/UL
RBC # FLD: 18.2 %
SODIUM SERPL-SCNC: 141 MMOL/L
SPECIMEN VOL 24H UR: NORMAL ML
WBC # FLD AUTO: 6.98 K/UL

## 2021-02-17 PROCEDURE — 99214 OFFICE O/P EST MOD 30 MIN: CPT

## 2021-02-17 RX ORDER — LENALIDOMIDE 2.5 MG/1
2.5 CAPSULE ORAL
Qty: 10 | Refills: 0 | Status: COMPLETED | COMMUNITY
Start: 2017-11-29 | End: 2021-02-17

## 2021-02-19 LAB
ALBUMIN MFR SERPL ELPH: 58.8 %
ALBUMIN SERPL-MCNC: 3.8 G/DL
ALBUMIN/GLOB SERPL: 1.5 RATIO
ALPHA1 GLOB MFR SERPL ELPH: 5.4 %
ALPHA1 GLOB SERPL ELPH-MCNC: 0.3 G/DL
ALPHA2 GLOB MFR SERPL ELPH: 13.1 %
ALPHA2 GLOB SERPL ELPH-MCNC: 0.8 G/DL
B-GLOBULIN MFR SERPL ELPH: 11.6 %
B-GLOBULIN SERPL ELPH-MCNC: 0.7 G/DL
DEPRECATED KAPPA LC FREE/LAMBDA SER: 0.02 RATIO
GAMMA GLOB FLD ELPH-MCNC: 0.7 G/DL
GAMMA GLOB MFR SERPL ELPH: 11.1 %
IGA SER QL IEP: 148 MG/DL
IGG SER QL IEP: 794 MG/DL
IGM SER QL IEP: 18 MG/DL
INTERPRETATION SERPL IEP-IMP: NORMAL
KAPPA LC CSF-MCNC: 336.79 MG/DL
KAPPA LC SERPL-MCNC: 5.19 MG/DL
M PROTEIN MFR SERPL ELPH: NORMAL
M PROTEIN SPEC IFE-MCNC: NORMAL
MONOCLON BAND OBS SERPL: NORMAL
PROT SERPL-MCNC: 6.4 G/DL

## 2021-02-20 RX ORDER — AMLODIPINE BESYLATE 5 MG/1
5 TABLET ORAL
Qty: 90 | Refills: 0 | Status: ACTIVE | COMMUNITY
Start: 2021-01-10

## 2021-02-20 NOTE — RESULTS/DATA
[FreeTextEntry1] :  Result Name Results Units Reference Range \par      PET CT WHOLE BODY TOP OF SKULL TO TI   SEE TEXT       \par     Patient Name: MOR, AUGUST : 1950\par Patient ID: [id number]\par Account: [id number]\par Patient Location: [facility]\par Accession: [id number]\par Procedure: PET CT WHOLE BODY TOP OF SKULL TO TIP OF TOES SUBSEQUENT 250-0036\par Date of Exam: 2017 11:09 AM\par Attending Physician: JARVIS LOW\par Requesting Physician: JARVIS LOW MD\par Clinical History / Reason for exam: FDG PET CT STUDY:\par Reason for examination: Tumor imaging - PET with concurrently acquired CT for\par attenuation correction and anatomic localization;  top of the skull  to toes/\par [address] multiple myeloma, subsequent treatment strategy.\par ICD-10 code:C90.0\par History: 66-year-old male patient with history of multiple myeloma, last\par chemotherapy received was in 2017. Patient had bone biopsy on\par 2017.\par Blood glucose pre injection 102 mg/dL\par Technique:\par Approximately 45 minutes after the intravenous administration of 13.4  mCi\par 18-Fluorine FDG, whole body PET images were acquired from top of  the skull to\par toes. In addition, non-iv and non oral contrast, low dose, non - diagnostic CT\par was acquired for attenuation correction and anatomic correlation only.\par Findings:\par Comparison: Prior PET CT study done on July 3, 2015. Correlation was performed\par with the skeletal survey done on 2016.\par Head /Neck: Physiologic FDG uptake is seen in the visualized region of the\par brain, pharyngeal lymphoid tissue, and salivary glands.\par Chest:\par Physiologic FDG avid PET is seen in the mediastinal blood pool and myocardium.\par Chest wall: Unremarkable\par Lungs: No abnormal uptake.\par Mediastinum/Pleura/pericardium: No abnormal uptake.\par Thoracic/ axillary lymph nodes: No abnormal uptake.\par Hepatobiliary: Unremarkable.\par Gallbladder: Multiple gallstones.\par Spleen: No abnormal uptake\par Pancreas: No abnormal uptake.\par Adrenal glands: No abnormal uptake.\par Kidneys/ureters/bladder: Normal excretory activity is demonstrated.\par Abdominal pelvic lymph nodes: No abnormal uptake.\par Bowel/peritoneum/mesentery: No abnormal uptake.\par Pelvic organs: Unremarkable. No abnormal increased uptake. Prosthetic\par calcifications.\par Bones/soft tissues: Osteoarthritic changes are identified in the bones. \par Patient\par has multiple extensive lytic lesions in the skeletal, seen in the skeletal\par survey  for details see the report  on 2016.\par In the prior examination there was PET positive lytic lesion right fifth rib\par with a max SUV 2.6, now non-FDG avid   and L5 vertebral body on the left side\par with a max SUV 3.8, now non-FDG avid with multiple other scattered lytic\par lesions, non-FDG avid.\par No abnormal increased uptake is seen in the lower extremities.\par Impression:\par 1. No new areas of abnormal increased uptake is seen.\par 2. Previously seen abnormal increased uptake seen in the right fifth rib and \par L5\par vertebral body on the left side, at this time both regions are non-FDG avid.\par 3. Multiple extensive lytic lesions in the skeletal consistent with multiple\par myeloma.\par 4. No other areas of abnormal increased uptake is seen.\par I the attending attest that I have personally reviewed these images and agree\par with this report.\par \par  \par

## 2021-02-20 NOTE — ASSESSMENT
[FreeTextEntry1] : #Free light chain multiple myeloma, specifically lambda.  Initial beta-2 was 27.1.  His bone marrow cytogenetics was normal.  He is status post four cycles of VCD.  This is followed by autologous bone marrow transplant in 07/2015,he has been on  weekly Velcade maintenance, He is also being followed by Dr. Krysta Rod in [place].  He currently takes his acyclovir.  Last myeloma panel  showed increasing lambda light chains but marrow showed minimal plasma cells with PET CT no progression.  HIs lambda continues to increase supporting biochemical recurrence with worsening creatinine  He was started on Daratumumab, Revlimid 5 mg daily d1-21 and  Dexamethasone  40 mg weekly PO with great response and is nos most likely was in  CR. Patient was taking Revlimid every other day and it was held for pancytopenia.  On 6/18/18 went on Revlimid maintenance . Achieved a VGPR/CR. His Lambda has been going up LUCIA is now positive and has Bence Philip is now possitive so will switch  course  \par - Decided to  restart Daratumumab SC  with Pomalyst 3 mg d1-21 every 28 days  and Dexamethasone weekly 40 mg. We  went over side effects. He is on ASA and ofelia redstart Acyclovir. \par = PET/CT is pending will call with results \par -RTC  with cycle 2 \par \par #Chronic kidney disease from multiple myeloma.  He has  been on dialysis in the past but is now off.  \par - creatinine has been relatively stable.  He follows up with Dr. Allen \par \par #Normocytic anemia, related to chronic kidney disease and Revlimid was on   Procrit  60,000 units every  other  week and s/p IV iron .  On hold again since Hgb >10 g/dL\par - will keep saturation over 20% and Ferritin over 100 if goes back on Procrit  \par \par 4. Moderate to severe   thrombocytopenia most likely from Revlimid. \par - Resolved\par \par 5. HTN \par - on Meds, will follow-up with his PCP for adjustment, stable today but systolic is still a bit high and he will reach out to Dr. Dooley\par \par RTC  with cycle 2.

## 2021-02-20 NOTE — CONSULT LETTER
[Dear  ___] : Dear  [unfilled], [Courtesy Letter:] : I had the pleasure of seeing your patient, [unfilled], in my office today. [Referral Closing:] : Thank you very much for seeing this patient.  If you have any questions, please do not hesitate to contact me. [Sincerely,] : Sincerely, [FreeTextEntry3] : Andrew

## 2021-02-20 NOTE — HISTORY OF PRESENT ILLNESS
[de-identified] : REASON FOR FOLLOWUP:  Maintenance Velcade for lambda light chain myeloma, status post autologous stem cell transplant.\par \par TREATMENT HISTORY:  On 05/02/2015, he was diagnosed with lambda light chain multiple myeloma when he had a syncopal episode in [place] and was found to be in acute renal failure.  He underwent a bone marrow biopsy that confirmed lambda light chain multiple myeloma.  His beta2 microglobulin was initially 27.1 on diagnosis.  His lambda light chain prior to treatment was as high as 02272.2 on 02/27/2015.  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  There was also diffuse acute tubular injury and modular tubular atrophy with interstitial fibrosis initially on dialysis, but currently been off dialysis for approximately one year.\par \par His treatment summary is as follows.  He initiated Velcade, Cytoxan, and dexamethasone, cycle started on 03/13/2015.  He completed four cycles on 05/08/2015.  He then underwent an autologous bone marrow transplant in 07/2015 at [place].  He has been on maintenance Velcade but in 6/17 was started on DRD due to  rising lambda with normal kappa.\par \par Mr. Motta is a very pleasant 66yearold gentleman with history of lambda light chain multiple myeloma, treated as above.\par  [FreeTextEntry1] : DRD started  on 6/21/17 went on  Revlimid maintenance 6/18/18 and stopped on 2/8/2021 [de-identified] : He presents today for followup she is currently on maintenance Velcade.his blood work from October 27 demonstrated a stage a faint lambda band on urine immunofixation. Nomi a count 7.66 hemoglobin 8.3 platelet count of 132. The SPEP was normal. Creatinine was stable at 5.93 potassium was 5.2 total protein 5.9 unremarkable LFTs free kappa lambda ratio normal 1.27 with free kappa at 42 and free lambda at 33.1. Most recent blood work from 1110 2016 shows a hemoglobin of 7.8. \par \par he was recently seen by Dr. Rod in  [place]. He had blood work done and reports that everything was stable. Dr. Rod also recommended to decrease his Velcade dose to 1.3 mg/m2\par \par He has no complaints. He denies any neuropathy.\par \par 1/4/17\par Presents for follow up today. Doing well on Maintenance Velcade.  No complaints. No neuropathy. \par \par 2/1/17\par He presents for follow up today. He has no complaints. Tolerating Velcade well.\par \par 4/5/17\par Doing well No complaints blood work from last visit was stable.\par \par 5/1/17\par He presents for follow up. He had increasing lambda light chains. Repeat was stable. A bone marrow showed minimal plasma cells and PET CT did not show any new activity.  He has no complaints.\par \par 6/5/2017\par Presents for follow up. He is doing well. He has some fatigue. His  Lambda has been slowly increasing. He has no other complaints.  No neuropathy\par \par 6/12/17\par He was brought back to discuss his lab work. His Lambda has been increasing with stable Kappa as well as slight worsening of his of creatinine He has no complaints.\par \par 7/12/17\par He has been on DRD. His HgB dropped to 5.9 and he is s/p PRBC transfusion.  He finished Revlimid on 7/9. His only complaint is leg crams at night. \par \par 8/9/17\par He is here for follow up. His cramps went away with the lower dose of Revlimid. His His lambda is coming down. He saw Dr. Rod last week as per patient.\par \par 8/30/27\par He is doing well. His legs cramps are better. He has severe anemia but he is asymptomatic. HIs creatinine is better and his light chain is at baseline.\par \par 9/27/17\par He is here for follow up. He needed  blood transfusion and hgb has been stable at 8 since than. He is also status post Venofer x2. He is on Procrit\par  40,000 units every 2 weeks. He feels well otherwise. \par \par 11/20/17\par Patient feels well and has no complaints. States he was taking Revlimid 5mg q48hrs prior to stopping for low counts. Patient will get Procrit and Darzalex. Patient will be restarted on Revilimid 2.5mg. Patient will continue every 2week Darzalex until week 25 then it becomes every 4 weeks. \par \par 12/18/17\par He is here for follow up. He is doing well. Blood work from last month still in CR. Cr and counts are better. He has no complaints and feel great.\par \par 1/22/18\par He is here for follow up. He is recovering from a cold. He is taking a prednisone taper given by his PMD. Had mules pain from cough.\par \par 2/26/18\par He is doing well. He has no complaints. His labs have been stable his light chain only went up slightly but ratio is normal. \par \par 3/27/18\par He is here for follow up he is doing well. He had blood work in [facility]  where he saw Dr. Rod 3/26/18 WBC 6.7, HgB 11.3, Pts 140, LFT WNL, , Ca 9.7. Cr. 5.96, alb 3.2\par \par He feels well. \par \par 4/23/18\par No events feels well. No complaints\par \par 5/21/18\par He is here for follow up. Doing well. Has no complaints\par \par 6/18/18\par His hgb has been dropping on monthly Procrit, His ligh chains are normal last Serum LUCIA from april 23 showed IgG kappa band thus he achieved aVGPR for DRD. He has no complaints.\par \par 7/16/18\par He is here for follow up. Doing well. No complaints. His kitten bit him.\par \par 8/13/18\par He is doing well. No complains. His HGb has been over 11 gm/dl So we are holding his procrit.\par \par 9/10/18\par He is here for follow up. He Myleoma panel is stable. Hgb is stable. He is due for his vaccinations 2 years post Auto and will have them today. He started Revlimid about 1 week ago. He has no complaints.\par \par 10/10/18\par He is here for follow up. He is doing well. BP is a bit high will watch. He has no complaints. He has his revlamid.\par \par 11/7/18\par He is here for follow up. His last hgb was under 10 gm/dl.  He is doing well otherwise. Was started on something for his blood pressure by Dr. Allen but he does not know what it is. He has his Revlimid\par \par 12/10/18\par Patient is here for a follow-up visit.  He is feeling well with no complaints.  Most recent CBC is stable.  Patient denies fever, chills, nausea, vomiting.  The light chains are picking up but it may be related to the CKD since LUCIA is negative and ratio is normal.\par \par 1/21/19\par He is here for follow-up with his wife.  His Kappa+Lambda have slightly risen, but the ratio is stable, so this is likely due to his CKD.   Most recent CBC is stable.  He received a prescription for Revlimid 5mg so he has been taking them every other day.  He is otherwise feeling well.  The rest of the CRAB criteria remains the same.  \par \par 2/25/19\par He is here with his wife.  As both his light chains are increasing, it is likely it is from his renal failure.  His most recent Hgb is 10.8g/dL, so we may stop when he nears 11.0g/dL.  He will see Dr. Rod on 3/25/19.   \par \par 3/20/19\par He is here for follow-up of MM with his wife.  His Hgb is stable at 11.3g/dL.  His BP is slightly elevated today. He will speak with his PCP.  He is otherwise feeling well.\par \par 4/22/19\par As of February, the immunofixation is picking up weak IgG Kappa bands no CRAB criteria  or significant rise in paraprotein that suggests progression .  We will continue with Revlimid at this time.   They went to see Dr. Rod from [facility] last month.   He feels well otherwise.\par \par 5/20/19\par He is here for follow up. He is doing well. His HgB is coming down so we will restart Procrit today. MM panel from March stable.\par \par 6/17/19\par He is here for follow-up of MM with his wife.  The MM panel from May was stable.  His hgb is 11.8g/dL today, so he does not need procrit.  He is feeling well. \par \par 7/15/19\par He is here for follow up. His  Myeloma panel from June is stable, Serum LUCIA remains negative.\par \par 8/12/19\par He is here for follow up. paraproteins from July are stable with negative serum LUCIA. His HgB is 10.9. he has no complaints. \par \par 9/16/19\par He is here for follow up. On day 4 of Revlimid. No complaints. HGB is stable. Serologically still in CR.\par \par 10/21/19\par he is here for follow up. No deviance of progression on last labs. he feels well.  On his off week of Revlimid. No complaints. to get his flu shot and pneumococcal vaccine with PMD next week.\par \par 11/18/19\par He is here for follow up of MM.  He is feeling well today.  He is seated with his wife.  He denies any bony pain or weight loss.  This is his off week for Revlimid.  No new complaints.  He is due to  start new cycle of Revlimid next Monday.  Hgb has improved to 11g/dL so we will hold procrit for now.  \par \par 12/23/19\par Patient is here for a follow-up visit for myeloma.  He is feeling well with no new complaints, tolerating revlimid 2.5 every other day.  This is his D2 of the next cycle of Revlimid, no refill needed at this time.  Most recent CBC is stable with hgb 11.2g/dL.  Patient denies fever, chills, nausea, vomiting, new pain or bleeding.  We will continue to hold Procrit for now.  \par \par 1/20/2020\par Patient is here for a follow-up visit for myeloma accompanied by his spouse.  He is feeling well with no new complaints, tolerating revlimid 2.5 every other day.  He has just begun the next cycle of Revlimid, no refill needed at this time.  Most recent CBC is stable with hgb 11.5g/dL.  Patient denies fever, chills, nausea, vomiting, new bony pain or bleeding.  We will continue to hold Procrit for now as his hgb is at target.  \par \par 2/24/2020\par Patient is here for a follow-up visit for myeloma accompanied by his spouse.   He is feeling well with no new complaints, tolerating revlimid 2.5 every other day.   Most recent CBC with hgb 10.8g/dL.  Myeloma panel from 01/2020 is stable with no quantifiable M-spike.  He is approaching week off of Revlimid and requesting refill.  \par \par 4/20/2020\par Patient is here for a follow-up visit for multiple myeloma.   He is feeling well with no new complaints, tolerating revlimid 2.5 every other day.   Most recent CBC with hgb 10.2g/dL.  Myeloma panel from 01/2020 remains with no quantifiable M-spike.   He has no complaints.\par \par \par 5/18/20\par He is here for follow up. he feels well. On revlamid. His last myeloma panel is without progression. His hgb is over 10 today.\par \par 7/20/20\par He is doing well he has no complaints. CBC showed stable hgb at 10.7. \par \par \par 8/31/20\par He is here for follow up. He is doing wel.. No isseus with Revlimid. Hgb remains over 10.  Myeloma labs remain unchanged with negative  serum LUCIA.\par \par \par 9/21/20\par He is here for follow up. His last Myeloma panel was unchanged bala still in CR SIFE is negative  CBC is stable.  He is on revlmaid just recently started this cycle. \par \par 11/18/20\par He is here for follow up. Doing well. No complinats. His BP meds were just adjusted by Dr. Allen. On revlamid and reports no side effects. Limited exam today due to COVID 19\par \par 2/8/2021: The patient is here for follow up for his multiple myeloma .His last blood work showed increasing serum light chain. This was in November. He feels great and shoveled snow. His CBC from today is stable. Mild anemia. He is on his second week of Revlimid. \par \par \par 2/17/21\par He is  here for  follow up. He feels well. 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 He feesl well otherwise. His PET/CT is pending. Since he had cast nephropathy in the past we went over options and decide to switch treatment.

## 2021-02-25 ENCOUNTER — OUTPATIENT (OUTPATIENT)
Dept: OUTPATIENT SERVICES | Facility: HOSPITAL | Age: 71
LOS: 1 days | Discharge: HOME | End: 2021-02-25
Payer: MEDICARE

## 2021-02-25 ENCOUNTER — RESULT REVIEW (OUTPATIENT)
Age: 71
End: 2021-02-25

## 2021-02-25 DIAGNOSIS — C90.00 MULTIPLE MYELOMA NOT HAVING ACHIEVED REMISSION: ICD-10-CM

## 2021-02-25 LAB — GLUCOSE BLDC GLUCOMTR-MCNC: 95 MG/DL — SIGNIFICANT CHANGE UP (ref 70–99)

## 2021-02-25 PROCEDURE — 78816 PET IMAGE W/CT FULL BODY: CPT | Mod: 26,PS

## 2021-03-01 ENCOUNTER — LABORATORY RESULT (OUTPATIENT)
Age: 71
End: 2021-03-01

## 2021-03-01 ENCOUNTER — APPOINTMENT (OUTPATIENT)
Dept: INFUSION THERAPY | Facility: CLINIC | Age: 71
End: 2021-03-01

## 2021-03-01 LAB
ALBUMIN SERPL ELPH-MCNC: 3.8 G/DL
ALP BLD-CCNC: 93 U/L
ALT SERPL-CCNC: 11 U/L
ANION GAP SERPL CALC-SCNC: 14 MMOL/L
AST SERPL-CCNC: 13 U/L
BILIRUB SERPL-MCNC: <0.2 MG/DL
BUN SERPL-MCNC: 57 MG/DL
CALCIUM SERPL-MCNC: 9.3 MG/DL
CHLORIDE SERPL-SCNC: 110 MMOL/L
CO2 SERPL-SCNC: 16 MMOL/L
CREAT SERPL-MCNC: 5 MG/DL
GLUCOSE SERPL-MCNC: 119 MG/DL
HCT VFR BLD CALC: 35 %
HGB BLD-MCNC: 11 G/DL
MCHC RBC-ENTMCNC: 25.2 PG
MCHC RBC-ENTMCNC: 31.4 G/DL
MCV RBC AUTO: 80.3 FL
PLATELET # BLD AUTO: 207 K/UL
PMV BLD: 8.6 FL
POTASSIUM SERPL-SCNC: 4.4 MMOL/L
PROT SERPL-MCNC: 6.4 G/DL
RBC # BLD: 4.36 M/UL
RBC # FLD: 18.6 %
SODIUM SERPL-SCNC: 140 MMOL/L
WBC # FLD AUTO: 6.97 K/UL

## 2021-03-01 RX ORDER — DIPHENHYDRAMINE HCL 50 MG
25 CAPSULE ORAL ONCE
Refills: 0 | Status: COMPLETED | OUTPATIENT
Start: 2021-03-01 | End: 2021-03-01

## 2021-03-01 RX ORDER — DARATUMUMAB AND HYALURONIDASE-FIHJ (HUMAN RECOMBINANT) 1800; 30000 MG/15ML; U/15ML
15 INJECTION SUBCUTANEOUS ONCE
Refills: 0 | Status: COMPLETED | OUTPATIENT
Start: 2021-03-01 | End: 2021-03-01

## 2021-03-01 RX ORDER — ACETAMINOPHEN 500 MG
650 TABLET ORAL ONCE
Refills: 0 | Status: COMPLETED | OUTPATIENT
Start: 2021-03-01 | End: 2021-03-01

## 2021-03-01 RX ADMIN — Medication 650 MILLIGRAM(S): at 10:27

## 2021-03-01 RX ADMIN — DARATUMUMAB AND HYALURONIDASE-FIHJ (HUMAN RECOMBINANT) 15 MILLILITER(S): 1800; 30000 INJECTION SUBCUTANEOUS at 11:17

## 2021-03-01 RX ADMIN — Medication 25 MILLIGRAM(S): at 10:27

## 2021-03-08 ENCOUNTER — APPOINTMENT (OUTPATIENT)
Dept: INFUSION THERAPY | Facility: CLINIC | Age: 71
End: 2021-03-08

## 2021-03-08 ENCOUNTER — LABORATORY RESULT (OUTPATIENT)
Age: 71
End: 2021-03-08

## 2021-03-08 LAB
ALBUMIN SERPL ELPH-MCNC: 3.9 G/DL
ALP BLD-CCNC: 83 U/L
ALT SERPL-CCNC: 15 U/L
ANION GAP SERPL CALC-SCNC: 15 MMOL/L
AST SERPL-CCNC: 13 U/L
BILIRUB SERPL-MCNC: <0.2 MG/DL
BUN SERPL-MCNC: 69 MG/DL
CALCIUM SERPL-MCNC: 9.3 MG/DL
CHLORIDE SERPL-SCNC: 105 MMOL/L
CO2 SERPL-SCNC: 17 MMOL/L
CREAT SERPL-MCNC: 5.1 MG/DL
GLUCOSE SERPL-MCNC: 124 MG/DL
HCT VFR BLD CALC: 34.2 %
HGB BLD-MCNC: 11 G/DL
MCHC RBC-ENTMCNC: 25.3 PG
MCHC RBC-ENTMCNC: 32.2 G/DL
MCV RBC AUTO: 78.8 FL
PLATELET # BLD AUTO: 159 K/UL
PMV BLD: 8.8 FL
POTASSIUM SERPL-SCNC: 4.7 MMOL/L
PROT SERPL-MCNC: 6.3 G/DL
RBC # BLD: 4.34 M/UL
RBC # FLD: 18.7 %
SODIUM SERPL-SCNC: 137 MMOL/L
WBC # FLD AUTO: 7.88 K/UL

## 2021-03-08 RX ORDER — DARATUMUMAB AND HYALURONIDASE-FIHJ (HUMAN RECOMBINANT) 1800; 30000 MG/15ML; U/15ML
15 INJECTION SUBCUTANEOUS ONCE
Refills: 0 | Status: COMPLETED | OUTPATIENT
Start: 2021-03-08 | End: 2021-03-08

## 2021-03-08 RX ORDER — ACETAMINOPHEN 500 MG
650 TABLET ORAL ONCE
Refills: 0 | Status: COMPLETED | OUTPATIENT
Start: 2021-03-08 | End: 2021-03-08

## 2021-03-08 RX ORDER — DIPHENHYDRAMINE HCL 50 MG
25 CAPSULE ORAL ONCE
Refills: 0 | Status: COMPLETED | OUTPATIENT
Start: 2021-03-08 | End: 2021-03-08

## 2021-03-08 RX ADMIN — Medication 650 MILLIGRAM(S): at 10:24

## 2021-03-08 RX ADMIN — Medication 25 MILLIGRAM(S): at 09:10

## 2021-03-08 RX ADMIN — DARATUMUMAB AND HYALURONIDASE-FIHJ (HUMAN RECOMBINANT) 15 MILLILITER(S): 1800; 30000 INJECTION SUBCUTANEOUS at 10:23

## 2021-03-08 RX ADMIN — Medication 650 MILLIGRAM(S): at 09:10

## 2021-03-15 ENCOUNTER — APPOINTMENT (OUTPATIENT)
Dept: INFUSION THERAPY | Facility: CLINIC | Age: 71
End: 2021-03-15

## 2021-03-15 ENCOUNTER — LABORATORY RESULT (OUTPATIENT)
Age: 71
End: 2021-03-15

## 2021-03-15 RX ORDER — DARATUMUMAB AND HYALURONIDASE-FIHJ (HUMAN RECOMBINANT) 1800; 30000 MG/15ML; U/15ML
15 INJECTION SUBCUTANEOUS ONCE
Refills: 0 | Status: COMPLETED | OUTPATIENT
Start: 2021-03-15 | End: 2021-03-15

## 2021-03-15 RX ORDER — DIPHENHYDRAMINE HCL 50 MG
25 CAPSULE ORAL ONCE
Refills: 0 | Status: COMPLETED | OUTPATIENT
Start: 2021-03-15 | End: 2021-03-15

## 2021-03-15 RX ORDER — ACETAMINOPHEN 500 MG
650 TABLET ORAL ONCE
Refills: 0 | Status: COMPLETED | OUTPATIENT
Start: 2021-03-15 | End: 2021-03-15

## 2021-03-15 RX ADMIN — Medication 650 MILLIGRAM(S): at 09:02

## 2021-03-15 RX ADMIN — Medication 25 MILLIGRAM(S): at 09:02

## 2021-03-15 RX ADMIN — DARATUMUMAB AND HYALURONIDASE-FIHJ (HUMAN RECOMBINANT) 15 MILLILITER(S): 1800; 30000 INJECTION SUBCUTANEOUS at 09:27

## 2021-03-19 ENCOUNTER — NON-APPOINTMENT (OUTPATIENT)
Age: 71
End: 2021-03-19

## 2021-03-22 ENCOUNTER — APPOINTMENT (OUTPATIENT)
Dept: INFUSION THERAPY | Facility: CLINIC | Age: 71
End: 2021-03-22

## 2021-03-22 ENCOUNTER — LABORATORY RESULT (OUTPATIENT)
Age: 71
End: 2021-03-22

## 2021-03-22 LAB
ALBUMIN SERPL ELPH-MCNC: 3.9 G/DL
ALP BLD-CCNC: 69 U/L
ALT SERPL-CCNC: 12 U/L
ANION GAP SERPL CALC-SCNC: 15 MMOL/L
AST SERPL-CCNC: 9 U/L
BILIRUB SERPL-MCNC: <0.2 MG/DL
BUN SERPL-MCNC: 68 MG/DL
CALCIUM SERPL-MCNC: 9.2 MG/DL
CHLORIDE SERPL-SCNC: 106 MMOL/L
CO2 SERPL-SCNC: 17 MMOL/L
CREAT SERPL-MCNC: 5.4 MG/DL
GLUCOSE SERPL-MCNC: 120 MG/DL
HCT VFR BLD CALC: 31.6 %
HGB BLD-MCNC: 10 G/DL
MCHC RBC-ENTMCNC: 25 PG
MCHC RBC-ENTMCNC: 31.6 G/DL
MCV RBC AUTO: 79 FL
PLATELET # BLD AUTO: 106 K/UL
PMV BLD: 9.2 FL
POTASSIUM SERPL-SCNC: 4.5 MMOL/L
PROT SERPL-MCNC: 5.9 G/DL
RBC # BLD: 4 M/UL
RBC # FLD: 18.8 %
SODIUM SERPL-SCNC: 138 MMOL/L
WBC # FLD AUTO: 3.89 K/UL

## 2021-03-22 RX ORDER — DIPHENHYDRAMINE HCL 50 MG
25 CAPSULE ORAL ONCE
Refills: 0 | Status: COMPLETED | OUTPATIENT
Start: 2021-03-22 | End: 2021-03-22

## 2021-03-22 RX ORDER — DARATUMUMAB AND HYALURONIDASE-FIHJ (HUMAN RECOMBINANT) 1800; 30000 MG/15ML; U/15ML
15 INJECTION SUBCUTANEOUS ONCE
Refills: 0 | Status: COMPLETED | OUTPATIENT
Start: 2021-03-22 | End: 2021-03-22

## 2021-03-22 RX ORDER — ACETAMINOPHEN 500 MG
650 TABLET ORAL ONCE
Refills: 0 | Status: COMPLETED | OUTPATIENT
Start: 2021-03-22 | End: 2021-03-22

## 2021-03-22 RX ADMIN — Medication 650 MILLIGRAM(S): at 08:57

## 2021-03-22 RX ADMIN — DARATUMUMAB AND HYALURONIDASE-FIHJ (HUMAN RECOMBINANT) 15 MILLILITER(S): 1800; 30000 INJECTION SUBCUTANEOUS at 09:30

## 2021-03-22 RX ADMIN — Medication 25 MILLIGRAM(S): at 08:57

## 2021-03-29 ENCOUNTER — APPOINTMENT (OUTPATIENT)
Dept: INFUSION THERAPY | Facility: CLINIC | Age: 71
End: 2021-03-29
Payer: MEDICARE

## 2021-03-29 ENCOUNTER — APPOINTMENT (OUTPATIENT)
Dept: HEMATOLOGY ONCOLOGY | Facility: CLINIC | Age: 71
End: 2021-03-29
Payer: MEDICARE

## 2021-03-29 ENCOUNTER — LABORATORY RESULT (OUTPATIENT)
Age: 71
End: 2021-03-29

## 2021-03-29 VITALS
HEART RATE: 86 BPM | HEIGHT: 66 IN | WEIGHT: 156 LBS | SYSTOLIC BLOOD PRESSURE: 157 MMHG | BODY MASS INDEX: 25.07 KG/M2 | TEMPERATURE: 97.1 F | DIASTOLIC BLOOD PRESSURE: 71 MMHG | RESPIRATION RATE: 14 BRPM

## 2021-03-29 DIAGNOSIS — N18.9 CHRONIC KIDNEY DISEASE, UNSPECIFIED: ICD-10-CM

## 2021-03-29 LAB
ALBUMIN SERPL ELPH-MCNC: 3.7 G/DL
ALBUMIN SERPL ELPH-MCNC: 3.8 G/DL
ALP BLD-CCNC: 71 U/L
ALP BLD-CCNC: 73 U/L
ALT SERPL-CCNC: 10 U/L
ALT SERPL-CCNC: 9 U/L
ANION GAP SERPL CALC-SCNC: 14 MMOL/L
ANION GAP SERPL CALC-SCNC: 14 MMOL/L
AST SERPL-CCNC: 7 U/L
AST SERPL-CCNC: 7 U/L
BILIRUB SERPL-MCNC: 0.3 MG/DL
BILIRUB SERPL-MCNC: <0.2 MG/DL
BUN SERPL-MCNC: 57 MG/DL
BUN SERPL-MCNC: 68 MG/DL
CALCIUM SERPL-MCNC: 8.6 MG/DL
CALCIUM SERPL-MCNC: 8.7 MG/DL
CHLORIDE SERPL-SCNC: 107 MMOL/L
CHLORIDE SERPL-SCNC: 107 MMOL/L
CO2 SERPL-SCNC: 17 MMOL/L
CO2 SERPL-SCNC: 18 MMOL/L
CREAT SERPL-MCNC: 4.9 MG/DL
CREAT SERPL-MCNC: 5.5 MG/DL
GLUCOSE SERPL-MCNC: 116 MG/DL
GLUCOSE SERPL-MCNC: 135 MG/DL
HCT VFR BLD CALC: 30 %
HCT VFR BLD CALC: 30.3 %
HGB BLD-MCNC: 9.7 G/DL
HGB BLD-MCNC: 9.9 G/DL
MCHC RBC-ENTMCNC: 25.3 PG
MCHC RBC-ENTMCNC: 25.6 PG
MCHC RBC-ENTMCNC: 32.3 G/DL
MCHC RBC-ENTMCNC: 32.7 G/DL
MCV RBC AUTO: 77.3 FL
MCV RBC AUTO: 79.2 FL
PLATELET # BLD AUTO: 102 K/UL
PLATELET # BLD AUTO: 164 K/UL
PMV BLD: 8.7 FL
PMV BLD: 9.6 FL
POTASSIUM SERPL-SCNC: 4.4 MMOL/L
POTASSIUM SERPL-SCNC: 4.5 MMOL/L
PROT SERPL-MCNC: 5.8 G/DL
PROT SERPL-MCNC: 5.9 G/DL
RBC # BLD: 3.79 M/UL
RBC # BLD: 3.92 M/UL
RBC # FLD: 18.3 %
RBC # FLD: 19.5 %
SODIUM SERPL-SCNC: 138 MMOL/L
SODIUM SERPL-SCNC: 139 MMOL/L
WBC # FLD AUTO: 4.47 K/UL
WBC # FLD AUTO: 8.71 K/UL

## 2021-03-29 PROCEDURE — 99213 OFFICE O/P EST LOW 20 MIN: CPT

## 2021-03-29 RX ORDER — DIPHENHYDRAMINE HCL 50 MG
25 CAPSULE ORAL ONCE
Refills: 0 | Status: COMPLETED | OUTPATIENT
Start: 2021-03-29 | End: 2021-03-29

## 2021-03-29 RX ORDER — DARATUMUMAB AND HYALURONIDASE-FIHJ (HUMAN RECOMBINANT) 1800; 30000 MG/15ML; U/15ML
15 INJECTION SUBCUTANEOUS ONCE
Refills: 0 | Status: COMPLETED | OUTPATIENT
Start: 2021-03-29 | End: 2021-03-29

## 2021-03-29 RX ORDER — ACETAMINOPHEN 500 MG
650 TABLET ORAL ONCE
Refills: 0 | Status: COMPLETED | OUTPATIENT
Start: 2021-03-29 | End: 2021-03-29

## 2021-03-29 RX ADMIN — Medication 25 MILLIGRAM(S): at 09:13

## 2021-03-29 RX ADMIN — DARATUMUMAB AND HYALURONIDASE-FIHJ (HUMAN RECOMBINANT) 15 MILLILITER(S): 1800; 30000 INJECTION SUBCUTANEOUS at 09:30

## 2021-03-29 RX ADMIN — Medication 650 MILLIGRAM(S): at 09:13

## 2021-03-29 NOTE — HISTORY OF PRESENT ILLNESS
[Therapy: ___] : Therapy: [unfilled] [Cycle: ___] : Cycle: [unfilled] [de-identified] : REASON FOR FOLLOWUP:  Maintenance Velcade for lambda light chain myeloma, status post autologous stem cell transplant.\par \par TREATMENT HISTORY:  On 05/02/2015, he was diagnosed with lambda light chain multiple myeloma when he had a syncopal episode in [place] and was found to be in acute renal failure.  He underwent a bone marrow biopsy that confirmed lambda light chain multiple myeloma.  His beta2 microglobulin was initially 27.1 on diagnosis.  His lambda light chain prior to treatment was as high as 12239.2 on 02/27/2015.  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  There was also diffuse acute tubular injury and modular tubular atrophy with interstitial fibrosis initially on dialysis, but currently been off dialysis for approximately one year.\par \par His treatment summary is as follows.  He initiated Velcade, Cytoxan, and dexamethasone, cycle started on 03/13/2015.  He completed four cycles on 05/08/2015.  He then underwent an autologous bone marrow transplant in 07/2015 at [place].  He has been on maintenance Velcade but in 6/17 was started on DRD due to  rising lambda with normal kappa.\par \par Mr. Motta is a very pleasant 66yearold gentleman with history of lambda light chain multiple myeloma, treated as above.\par  [FreeTextEntry1] : DRD started  on 6/21/17 went on  Revlimid maintenance 6/18/18 and stopped on 2/8/2021 Started Esthela Pom and Dexa on 3/1/21 [de-identified] : He presents today for followup she is currently on maintenance Velcade.his blood work from October 27 demonstrated a stage a faint lambda band on urine immunofixation. Nomi a count 7.66 hemoglobin 8.3 platelet count of 132. The SPEP was normal. Creatinine was stable at 5.93 potassium was 5.2 total protein 5.9 unremarkable LFTs free kappa lambda ratio normal 1.27 with free kappa at 42 and free lambda at 33.1. Most recent blood work from 1110 2016 shows a hemoglobin of 7.8. \par \par he was recently seen by Dr. Rod in  [place]. He had blood work done and reports that everything was stable. Dr. Rod also recommended to decrease his Velcade dose to 1.3 mg/m2\par \par He has no complaints. He denies any neuropathy.\par \par 1/4/17\par Presents for follow up today. Doing well on Maintenance Velcade.  No complaints. No neuropathy. \par \par 2/1/17\par He presents for follow up today. He has no complaints. Tolerating Velcade well.\par \par 4/5/17\par Doing well No complaints blood work from last visit was stable.\par \par 5/1/17\par He presents for follow up. He had increasing lambda light chains. Repeat was stable. A bone marrow showed minimal plasma cells and PET CT did not show any new activity.  He has no complaints.\par \par 6/5/2017\par Presents for follow up. He is doing well. He has some fatigue. His  Lambda has been slowly increasing. He has no other complaints.  No neuropathy\par \par 6/12/17\par He was brought back to discuss his lab work. His Lambda has been increasing with stable Kappa as well as slight worsening of his of creatinine He has no complaints.\par \par 7/12/17\par He has been on DRD. His HgB dropped to 5.9 and he is s/p PRBC transfusion.  He finished Revlimid on 7/9. His only complaint is leg crams at night. \par \par 8/9/17\par He is here for follow up. His cramps went away with the lower dose of Revlimid. His His lambda is coming down. He saw Dr. Rod last week as per patient.\par \par 8/30/27\par He is doing well. His legs cramps are better. He has severe anemia but he is asymptomatic. HIs creatinine is better and his light chain is at baseline.\par \par 9/27/17\par He is here for follow up. He needed  blood transfusion and hgb has been stable at 8 since than. He is also status post Venofer x2. He is on Procrit\par  40,000 units every 2 weeks. He feels well otherwise. \par \par 11/20/17\par Patient feels well and has no complaints. States he was taking Revlimid 5mg q48hrs prior to stopping for low counts. Patient will get Procrit and Darzalex. Patient will be restarted on Revilimid 2.5mg. Patient will continue every 2week Darzalex until week 25 then it becomes every 4 weeks. \par \par 12/18/17\par He is here for follow up. He is doing well. Blood work from last month still in CR. Cr and counts are better. He has no complaints and feel great.\par \par 1/22/18\par He is here for follow up. He is recovering from a cold. He is taking a prednisone taper given by his PMD. Had mules pain from cough.\par \par 2/26/18\par He is doing well. He has no complaints. His labs have been stable his light chain only went up slightly but ratio is normal. \par \par 3/27/18\par He is here for follow up he is doing well. He had blood work in [facility]  where he saw Dr. Rod 3/26/18 WBC 6.7, HgB 11.3, Pts 140, LFT WNL, , Ca 9.7. Cr. 5.96, alb 3.2\par \par He feels well. \par \par 4/23/18\par No events feels well. No complaints\par \par 5/21/18\par He is here for follow up. Doing well. Has no complaints\par \par 6/18/18\par His hgb has been dropping on monthly Procrit, His ligh chains are normal last Serum LUCIA from april 23 showed IgG kappa band thus he achieved aVGPR for DRD. He has no complaints.\par \par 7/16/18\par He is here for follow up. Doing well. No complaints. His kitten bit him.\par \par 8/13/18\par He is doing well. No complains. His HGb has been over 11 gm/dl So we are holding his procrit.\par \par 9/10/18\par He is here for follow up. He Myleoma panel is stable. Hgb is stable. He is due for his vaccinations 2 years post Auto and will have them today. He started Revlimid about 1 week ago. He has no complaints.\par \par 10/10/18\par He is here for follow up. He is doing well. BP is a bit high will watch. He has no complaints. He has his revlamid.\par \par 11/7/18\par He is here for follow up. His last hgb was under 10 gm/dl.  He is doing well otherwise. Was started on something for his blood pressure by Dr. Allen but he does not know what it is. He has his Revlimid\par \par 12/10/18\par Patient is here for a follow-up visit.  He is feeling well with no complaints.  Most recent CBC is stable.  Patient denies fever, chills, nausea, vomiting.  The light chains are picking up but it may be related to the CKD since LUCIA is negative and ratio is normal.\par \par 1/21/19\par He is here for follow-up with his wife.  His Kappa+Lambda have slightly risen, but the ratio is stable, so this is likely due to his CKD.   Most recent CBC is stable.  He received a prescription for Revlimid 5mg so he has been taking them every other day.  He is otherwise feeling well.  The rest of the CRAB criteria remains the same.  \par \par 2/25/19\par He is here with his wife.  As both his light chains are increasing, it is likely it is from his renal failure.  His most recent Hgb is 10.8g/dL, so we may stop when he nears 11.0g/dL.  He will see Dr. Rod on 3/25/19.   \par \par 3/20/19\par He is here for follow-up of MM with his wife.  His Hgb is stable at 11.3g/dL.  His BP is slightly elevated today. He will speak with his PCP.  He is otherwise feeling well.\par \par 4/22/19\par As of February, the immunofixation is picking up weak IgG Kappa bands no CRAB criteria  or significant rise in paraprotein that suggests progression .  We will continue with Revlimid at this time.   They went to see Dr. Rod from [facility] last month.   He feels well otherwise.\par \par 5/20/19\par He is here for follow up. He is doing well. His HgB is coming down so we will restart Procrit today. MM panel from March stable.\par \par 6/17/19\par He is here for follow-up of MM with his wife.  The MM panel from May was stable.  His hgb is 11.8g/dL today, so he does not need procrit.  He is feeling well. \par \par 7/15/19\par He is here for follow up. His  Myeloma panel from June is stable, Serum LUCIA remains negative.\par \par 8/12/19\par He is here for follow up. paraproteins from July are stable with negative serum LUCIA. His HgB is 10.9. he has no complaints. \par \par 9/16/19\par He is here for follow up. On day 4 of Revlimid. No complaints. HGB is stable. Serologically still in CR.\par \par 10/21/19\par he is here for follow up. No deviance of progression on last labs. he feels well.  On his off week of Revlimid. No complaints. to get his flu shot and pneumococcal vaccine with PMD next week.\par \par 11/18/19\par He is here for follow up of MM.  He is feeling well today.  He is seated with his wife.  He denies any bony pain or weight loss.  This is his off week for Revlimid.  No new complaints.  He is due to  start new cycle of Revlimid next Monday.  Hgb has improved to 11g/dL so we will hold procrit for now.  \par \par 12/23/19\par Patient is here for a follow-up visit for myeloma.  He is feeling well with no new complaints, tolerating revlimid 2.5 every other day.  This is his D2 of the next cycle of Revlimid, no refill needed at this time.  Most recent CBC is stable with hgb 11.2g/dL.  Patient denies fever, chills, nausea, vomiting, new pain or bleeding.  We will continue to hold Procrit for now.  \par \par 1/20/2020\par Patient is here for a follow-up visit for myeloma accompanied by his spouse.  He is feeling well with no new complaints, tolerating revlimid 2.5 every other day.  He has just begun the next cycle of Revlimid, no refill needed at this time.  Most recent CBC is stable with hgb 11.5g/dL.  Patient denies fever, chills, nausea, vomiting, new bony pain or bleeding.  We will continue to hold Procrit for now as his hgb is at target.  \par \par 2/24/2020\par Patient is here for a follow-up visit for myeloma accompanied by his spouse.   He is feeling well with no new complaints, tolerating revlimid 2.5 every other day.   Most recent CBC with hgb 10.8g/dL.  Myeloma panel from 01/2020 is stable with no quantifiable M-spike.  He is approaching week off of Revlimid and requesting refill.  \par \par 4/20/2020\par Patient is here for a follow-up visit for multiple myeloma.   He is feeling well with no new complaints, tolerating revlimid 2.5 every other day.   Most recent CBC with hgb 10.2g/dL.  Myeloma panel from 01/2020 remains with no quantifiable M-spike.   He has no complaints.\par \par \par 5/18/20\par He is here for follow up. he feels well. On revlamid. His last myeloma panel is without progression. His hgb is over 10 today.\par \par 7/20/20\par He is doing well he has no complaints. CBC showed stable hgb at 10.7. \par \par \par 8/31/20\par He is here for follow up. He is doing wel.. No isseus with Revlimid. Hgb remains over 10.  Myeloma labs remain unchanged with negative  serum LUCIA.\par \par \par 9/21/20\par He is here for follow up. His last Myeloma panel was unchanged alexandroley still in CR SIFE is negative  CBC is stable.  He is on revlmaid just recently started this cycle. \par \par 11/18/20\par He is here for follow up. Doing well. No complinats. His BP meds were just adjusted by Dr. Allen. On revlamid and reports no side effects. Limited exam today due to COVID 19\par \par 2/8/2021: The patient is here for follow up for his multiple myeloma .His last blood work showed increasing serum light chain. This was in November. He feels great and shoveled snow. His CBC from today is stable. Mild anemia. He is on his second week of Revlimid. \par \par \par 2/17/21\par He is  here for  follow up. He feels well. 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 He feesl well otherwise. His PET/CT is pending. Since he had cast nephropathy in the past we went over options and decide to switch treatment. \par \par 3/29/21\par He is here for follow up. He is on  due for week 5 of Dapa Pom and Dexa today. He had PET/CT on 2/25/21 that showed   Dapa Pom and Dexa. He is doing well. Hgb is just udner 10 and will monitor if continue to fall will consider  restarting Procrit. He has  no complaints.

## 2021-03-29 NOTE — ASSESSMENT
[FreeTextEntry1] : #Free light chain multiple myeloma, specifically lambda.  Initial beta-2 was 27.1.  His bone marrow cytogenetics was normal.  He is status post four cycles of VCD.  This is followed by autologous bone marrow transplant in 07/2015,he has been on  weekly Velcade maintenance, He is also being followed by Dr. Krysta Rod in [place].  He currently takes his acyclovir.  Last myeloma panel  showed increasing lambda light chains but marrow showed minimal plasma cells with PET CT no progression.  HIs lambda continues to increase supporting biochemical recurrence with worsening creatinine  He was started on Daratumumab, Revlimid 5 mg daily d1-21 and  Dexamethasone  40 mg weekly PO with great response and is nos most likely was in  CR. Patient was taking Revlimid every other day and it was held for pancytopenia.  On 6/18/18 went on Revlimid maintenance . Achieved a VGPR/CR. His Lambda has been going up LUCIA is now positive and has Bence Philip is now positive and new Pelvic lytic lesion  on PET/CT on 2/25/21\par - On week 5 of  Daratumumab SC  with Pomalyst 3 mg d1-21 every 28 days  and Dexamethasone weekly 40 mg. We  went over side effects. He is on ASA and ofelia redstart Acyclovir. \par = PET/CT  showed a new lytic lesion in left iliac bone 2/25/21 but we decided to hold off Xgeva  for now but will start it if not responding to treatment.\par -RTC  with cycle 2 \par \par #Chronic kidney disease from multiple myeloma.  He has  been on dialysis in the past but is now off.  \par - creatinine has been relatively stable.  He follows up with Dr. Allen \par \par #Normocytic anemia, related to chronic kidney disease and Revlimid was on   Procrit  60,000 units every  other  week and s/p IV iron .  On hold again  anemia is mild if worsens will consider restarting \par - will keep saturation over 20% and Ferritin over 100 if goes back on Procrit  \par \par 4. Moderate to severe   thrombocytopenia most likely from Revlimid. \par - Resolved\par \par 5. HTN \par - on Meds, will follow-up with his PCP for adjustment, stable today but systolic is still a bit high and he will reach out to Dr. Dooley\par \par RTC  with cycle 3

## 2021-03-29 NOTE — REVIEW OF SYSTEMS
[Negative] : Heme/Lymph [Fever] : no fever [Chills] : no chills [Fatigue] : no fatigue [Dry Eyes] : no dryness of the eyes [Mucosal Pain] : no mucosal pain [Leg Claudication] : no intermittent leg claudication [Lower Ext Edema] : no lower extremity edema [SOB on Exertion] : no shortness of breath during exertion [Abdominal Pain] : no abdominal pain [Constipation] : no constipation [Muscle Pain] : no muscle pain

## 2021-03-29 NOTE — RESULTS/DATA
[FreeTextEntry1] :  Result Name Results Units Reference Range \par      PET CT WHOLE BODY TOP OF SKULL TO TI   SEE TEXT       \par     Patient Name: MOR, AUGUST : 1950\par Patient ID: [id number]\par Account: [id number]\par Patient Location: [facility]\par Accession: [id number]\par Procedure: PET CT WHOLE BODY TOP OF SKULL TO TIP OF TOES SUBSEQUENT 250-0036\par Date of Exam: 2017 11:09 AM\par Attending Physician: JARVIS LOW\par Requesting Physician: JARVIS LOW MD\par Clinical History / Reason for exam: FDG PET CT STUDY:\par Reason for examination: Tumor imaging - PET with concurrently acquired CT for\par attenuation correction and anatomic localization;  top of the skull  to toes/\par [address]25 multiple myeloma, subsequent treatment strategy.\par ICD-10 code:C90.0\par History: 66-year-old male patient with history of multiple myeloma, last\par chemotherapy received was in 2017. Patient had bone biopsy on\par 2017.\par Blood glucose pre injection 102 mg/dL\par Technique:\par Approximately 45 minutes after the intravenous administration of 13.4  mCi\par 18-Fluorine FDG, whole body PET images were acquired from top of  the skull to\par toes. In addition, non-iv and non oral contrast, low dose, non - diagnostic CT\par was acquired for attenuation correction and anatomic correlation only.\par Findings:\par Comparison: Prior PET CT study done on July 3, 2015. Correlation was performed\par with the skeletal survey done on 2016.\par Head /Neck: Physiologic FDG uptake is seen in the visualized region of the\par brain, pharyngeal lymphoid tissue, and salivary glands.\par Chest:\par Physiologic FDG avid PET is seen in the mediastinal blood pool and myocardium.\par Chest wall: Unremarkable\par Lungs: No abnormal uptake.\par Mediastinum/Pleura/pericardium: No abnormal uptake.\par Thoracic/ axillary lymph nodes: No abnormal uptake.\par Hepatobiliary: Unremarkable.\par Gallbladder: Multiple gallstones.\par Spleen: No abnormal uptake\par Pancreas: No abnormal uptake.\par Adrenal glands: No abnormal uptake.\par Kidneys/ureters/bladder: Normal excretory activity is demonstrated.\par Abdominal pelvic lymph nodes: No abnormal uptake.\par Bowel/peritoneum/mesentery: No abnormal uptake.\par Pelvic organs: Unremarkable. No abnormal increased uptake. Prosthetic\par calcifications.\par Bones/soft tissues: Osteoarthritic changes are identified in the bones. \par Patient\par has multiple extensive lytic lesions in the skeletal, seen in the skeletal\par survey  for details see the report  on 2016.\par In the prior examination there was PET positive lytic lesion right fifth rib\par with a max SUV 2.6, now non-FDG avid   and L5 vertebral body on the left side\par with a max SUV 3.8, now non-FDG avid with multiple other scattered lytic\par lesions, non-FDG avid.\par No abnormal increased uptake is seen in the lower extremities.\par Impression:\par 1. No new areas of abnormal increased uptake is seen.\par 2. Previously seen abnormal increased uptake seen in the right fifth rib and \par L5\par vertebral body on the left side, at this time both regions are non-FDG avid.\par 3. Multiple extensive lytic lesions in the skeletal consistent with multiple\par myeloma.\par 4. No other areas of abnormal increased uptake is seen.\par I the attending attest that I have personally reviewed these images and agree\par with this report.\par \par  \par

## 2021-03-31 LAB
ALBUMIN MFR SERPL ELPH: 59.3 %
ALBUMIN SERPL-MCNC: 3.4 G/DL
ALBUMIN/GLOB SERPL: 1.4 RATIO
ALPHA1 GLOB MFR SERPL ELPH: 6.9 %
ALPHA1 GLOB SERPL ELPH-MCNC: 0.4 G/DL
ALPHA2 GLOB MFR SERPL ELPH: 14.6 %
ALPHA2 GLOB SERPL ELPH-MCNC: 0.8 G/DL
B-GLOBULIN MFR SERPL ELPH: 10.9 %
B-GLOBULIN SERPL ELPH-MCNC: 0.6 G/DL
DEPRECATED KAPPA LC FREE/LAMBDA SER: 0.02 RATIO
GAMMA GLOB FLD ELPH-MCNC: 0.5 G/DL
GAMMA GLOB MFR SERPL ELPH: 8.3 %
IGA SER QL IEP: 53 MG/DL
IGG SER QL IEP: 575 MG/DL
IGM SER QL IEP: 14 MG/DL
INTERPRETATION SERPL IEP-IMP: NORMAL
KAPPA LC CSF-MCNC: 111.12 MG/DL
KAPPA LC SERPL-MCNC: 1.84 MG/DL
M PROTEIN MFR SERPL ELPH: NORMAL
M PROTEIN SPEC IFE-MCNC: NORMAL
MONOCLON BAND OBS SERPL: NORMAL
PROT SERPL-MCNC: 5.8 G/DL
PROT SERPL-MCNC: 5.8 G/DL

## 2021-04-05 ENCOUNTER — APPOINTMENT (OUTPATIENT)
Dept: INFUSION THERAPY | Facility: CLINIC | Age: 71
End: 2021-04-05

## 2021-04-05 ENCOUNTER — LABORATORY RESULT (OUTPATIENT)
Age: 71
End: 2021-04-05

## 2021-04-05 LAB
ALBUMIN SERPL ELPH-MCNC: 3.9 G/DL
ALP BLD-CCNC: 71 U/L
ALT SERPL-CCNC: 10 U/L
ANION GAP SERPL CALC-SCNC: 14 MMOL/L
AST SERPL-CCNC: 8 U/L
BILIRUB SERPL-MCNC: <0.2 MG/DL
BUN SERPL-MCNC: 53 MG/DL
CALCIUM SERPL-MCNC: 8.9 MG/DL
CHLORIDE SERPL-SCNC: 105 MMOL/L
CO2 SERPL-SCNC: 17 MMOL/L
CREAT SERPL-MCNC: 4.8 MG/DL
GLUCOSE SERPL-MCNC: 108 MG/DL
HCT VFR BLD CALC: 31.2 %
HGB BLD-MCNC: 9.9 G/DL
MCHC RBC-ENTMCNC: 25.5 PG
MCHC RBC-ENTMCNC: 31.7 G/DL
MCV RBC AUTO: 80.4 FL
PLATELET # BLD AUTO: 210 K/UL
PMV BLD: 9.1 FL
POTASSIUM SERPL-SCNC: 4.6 MMOL/L
PROT SERPL-MCNC: 5.8 G/DL
RBC # BLD: 3.88 M/UL
RBC # FLD: 20.5 %
SODIUM SERPL-SCNC: 136 MMOL/L
WBC # FLD AUTO: 6.69 K/UL

## 2021-04-05 RX ORDER — DARATUMUMAB AND HYALURONIDASE-FIHJ (HUMAN RECOMBINANT) 1800; 30000 MG/15ML; U/15ML
15 INJECTION SUBCUTANEOUS ONCE
Refills: 0 | Status: COMPLETED | OUTPATIENT
Start: 2021-04-05 | End: 2021-04-05

## 2021-04-05 RX ADMIN — DARATUMUMAB AND HYALURONIDASE-FIHJ (HUMAN RECOMBINANT) 15 MILLILITER(S): 1800; 30000 INJECTION SUBCUTANEOUS at 09:25

## 2021-04-12 ENCOUNTER — APPOINTMENT (OUTPATIENT)
Dept: INFUSION THERAPY | Facility: CLINIC | Age: 71
End: 2021-04-12

## 2021-04-12 ENCOUNTER — LABORATORY RESULT (OUTPATIENT)
Age: 71
End: 2021-04-12

## 2021-04-12 LAB
ALBUMIN SERPL ELPH-MCNC: 3.8 G/DL
ALP BLD-CCNC: 70 U/L
ALT SERPL-CCNC: 9 U/L
ANION GAP SERPL CALC-SCNC: 14 MMOL/L
AST SERPL-CCNC: 8 U/L
BILIRUB SERPL-MCNC: <0.2 MG/DL
BUN SERPL-MCNC: 56 MG/DL
CALCIUM SERPL-MCNC: 8.8 MG/DL
CHLORIDE SERPL-SCNC: 108 MMOL/L
CO2 SERPL-SCNC: 16 MMOL/L
CREAT SERPL-MCNC: 4.8 MG/DL
GLUCOSE SERPL-MCNC: 110 MG/DL
HCT VFR BLD CALC: 30.2 %
HGB BLD-MCNC: 9.7 G/DL
MCHC RBC-ENTMCNC: 25.7 PG
MCHC RBC-ENTMCNC: 32.1 G/DL
MCV RBC AUTO: 79.9 FL
PLATELET # BLD AUTO: 170 K/UL
PMV BLD: 9.2 FL
POTASSIUM SERPL-SCNC: 4.9 MMOL/L
PROT SERPL-MCNC: 6 G/DL
RBC # BLD: 3.78 M/UL
RBC # FLD: 20.6 %
SODIUM SERPL-SCNC: 138 MMOL/L
WBC # FLD AUTO: 7.32 K/UL

## 2021-04-12 RX ORDER — DARATUMUMAB AND HYALURONIDASE-FIHJ (HUMAN RECOMBINANT) 1800; 30000 MG/15ML; U/15ML
15 INJECTION SUBCUTANEOUS ONCE
Refills: 0 | Status: COMPLETED | OUTPATIENT
Start: 2021-04-12 | End: 2021-04-12

## 2021-04-12 RX ADMIN — DARATUMUMAB AND HYALURONIDASE-FIHJ (HUMAN RECOMBINANT) 15 MILLILITER(S): 1800; 30000 INJECTION SUBCUTANEOUS at 09:07

## 2021-04-19 ENCOUNTER — LABORATORY RESULT (OUTPATIENT)
Age: 71
End: 2021-04-19

## 2021-04-19 ENCOUNTER — APPOINTMENT (OUTPATIENT)
Dept: INFUSION THERAPY | Facility: CLINIC | Age: 71
End: 2021-04-19

## 2021-04-19 ENCOUNTER — NON-APPOINTMENT (OUTPATIENT)
Age: 71
End: 2021-04-19

## 2021-04-19 LAB
ALBUMIN SERPL ELPH-MCNC: 3.9 G/DL
ALP BLD-CCNC: 72 U/L
ALT SERPL-CCNC: 9 U/L
ANION GAP SERPL CALC-SCNC: 17 MMOL/L
AST SERPL-CCNC: 7 U/L
BILIRUB SERPL-MCNC: 0.2 MG/DL
BUN SERPL-MCNC: 56 MG/DL
CALCIUM SERPL-MCNC: 8.7 MG/DL
CHLORIDE SERPL-SCNC: 106 MMOL/L
CO2 SERPL-SCNC: 17 MMOL/L
CREAT SERPL-MCNC: 4.7 MG/DL
GLUCOSE SERPL-MCNC: 136 MG/DL
HCT VFR BLD CALC: 29.1 %
HGB BLD-MCNC: 9.2 G/DL
MCHC RBC-ENTMCNC: 26 PG
MCHC RBC-ENTMCNC: 31.6 G/DL
MCV RBC AUTO: 82.2 FL
PLATELET # BLD AUTO: 146 K/UL
PMV BLD: 8.5 FL
POTASSIUM SERPL-SCNC: 5 MMOL/L
PROT SERPL-MCNC: 5.7 G/DL
RBC # BLD: 3.54 M/UL
RBC # FLD: 21.2 %
SODIUM SERPL-SCNC: 140 MMOL/L
WBC # FLD AUTO: 4.57 K/UL

## 2021-04-19 RX ORDER — DARATUMUMAB AND HYALURONIDASE-FIHJ (HUMAN RECOMBINANT) 1800; 30000 MG/15ML; U/15ML
15 INJECTION SUBCUTANEOUS ONCE
Refills: 0 | Status: COMPLETED | OUTPATIENT
Start: 2021-04-19 | End: 2021-04-19

## 2021-04-19 RX ADMIN — DARATUMUMAB AND HYALURONIDASE-FIHJ (HUMAN RECOMBINANT) 15 MILLILITER(S): 1800; 30000 INJECTION SUBCUTANEOUS at 09:17

## 2021-04-20 ENCOUNTER — NON-APPOINTMENT (OUTPATIENT)
Age: 71
End: 2021-04-20

## 2021-04-22 ENCOUNTER — RX RENEWAL (OUTPATIENT)
Age: 71
End: 2021-04-22

## 2021-04-26 ENCOUNTER — LABORATORY RESULT (OUTPATIENT)
Age: 71
End: 2021-04-26

## 2021-04-26 ENCOUNTER — APPOINTMENT (OUTPATIENT)
Dept: INFUSION THERAPY | Facility: CLINIC | Age: 71
End: 2021-04-26

## 2021-04-26 ENCOUNTER — APPOINTMENT (OUTPATIENT)
Dept: HEMATOLOGY ONCOLOGY | Facility: CLINIC | Age: 71
End: 2021-04-26
Payer: MEDICARE

## 2021-04-26 VITALS
WEIGHT: 159 LBS | BODY MASS INDEX: 25.55 KG/M2 | DIASTOLIC BLOOD PRESSURE: 79 MMHG | RESPIRATION RATE: 14 BRPM | SYSTOLIC BLOOD PRESSURE: 155 MMHG | HEIGHT: 66 IN

## 2021-04-26 LAB
ALBUMIN SERPL ELPH-MCNC: 3.9 G/DL
ALP BLD-CCNC: 68 U/L
ALT SERPL-CCNC: 10 U/L
ANION GAP SERPL CALC-SCNC: 13 MMOL/L
AST SERPL-CCNC: 10 U/L
BILIRUB SERPL-MCNC: <0.2 MG/DL
BUN SERPL-MCNC: 57 MG/DL
CALCIUM SERPL-MCNC: 9 MG/DL
CHLORIDE SERPL-SCNC: 109 MMOL/L
CO2 SERPL-SCNC: 15 MMOL/L
CREAT SERPL-MCNC: 4.6 MG/DL
GLUCOSE SERPL-MCNC: 116 MG/DL
HCT VFR BLD CALC: 30.5 %
HGB BLD-MCNC: 9.7 G/DL
MCHC RBC-ENTMCNC: 25.9 PG
MCHC RBC-ENTMCNC: 31.8 G/DL
MCV RBC AUTO: 81.6 FL
PLATELET # BLD AUTO: 211 K/UL
PMV BLD: 9.3 FL
POTASSIUM SERPL-SCNC: 4.5 MMOL/L
PROT SERPL-MCNC: 6.2 G/DL
RBC # BLD: 3.74 M/UL
RBC # FLD: 21.8 %
SODIUM SERPL-SCNC: 137 MMOL/L
WBC # FLD AUTO: 5.5 K/UL

## 2021-04-26 PROCEDURE — 99213 OFFICE O/P EST LOW 20 MIN: CPT

## 2021-04-26 RX ORDER — DARATUMUMAB AND HYALURONIDASE-FIHJ (HUMAN RECOMBINANT) 1800; 30000 MG/15ML; U/15ML
15 INJECTION SUBCUTANEOUS ONCE
Refills: 0 | Status: COMPLETED | OUTPATIENT
Start: 2021-04-26 | End: 2021-04-26

## 2021-04-26 RX ADMIN — DARATUMUMAB AND HYALURONIDASE-FIHJ (HUMAN RECOMBINANT) 15 MILLILITER(S): 1800; 30000 INJECTION SUBCUTANEOUS at 10:48

## 2021-04-26 NOTE — REVIEW OF SYSTEMS
[Fever] : no fever [Chills] : no chills [Fatigue] : no fatigue [Dry Eyes] : no dryness of the eyes [Mucosal Pain] : no mucosal pain [Leg Claudication] : no intermittent leg claudication [Lower Ext Edema] : no lower extremity edema [SOB on Exertion] : no shortness of breath during exertion [Abdominal Pain] : no abdominal pain [Constipation] : no constipation [Muscle Pain] : no muscle pain

## 2021-04-26 NOTE — ASSESSMENT
[FreeTextEntry1] : #Free light chain multiple myeloma, specifically lambda.  Initial beta-2 was 27.1.  His bone marrow cytogenetics was normal.  He is status post four cycles of VCD.  This is followed by autologous bone marrow transplant in 07/2015,he has been on  weekly Velcade maintenance, He is also being followed by Dr. Krysta Rod in [place].  He currently takes his acyclovir.  Last myeloma panel  showed increasing lambda light chains but marrow showed minimal plasma cells with PET CT no progression.  HIs lambda continues to increase supporting biochemical recurrence with worsening creatinine  He was started on Daratumumab, Revlimid 5 mg daily d1-21 and  Dexamethasone  40 mg weekly PO with great response and is nos most likely was in  CR. Patient was taking Revlimid every other day and it was held for pancytopenia.  On 6/18/18 went on Revlimid maintenance . Achieved a VGPR/CR. His Lambda has been going up LUCIA is now positive and has Bence Philip is now positive and new Pelvic lytic lesion  on PET/CT on 2/25/21\par - On week 9 of  Daratumumab SC  with Pomalyst 3 mg d1-21 every 28 days  and Dexamethasone weekly 40 mg. Involved light chain improving so is his renal function.   We  went over side effects. He is on ASA and and  Acyclovir. \par = PET/CT  showed a new lytic lesion in left iliac bone 2/25/21 but we decided to hold off Xgeva  for now but will start it if not responding to treatment.\par -RTC  with cycle 3\par -cbc, cmp serum immunoelectrophoresis today \par \par #Chronic kidney disease from multiple myeloma.  He has  been on dialysis in the past but is now off.  \par - creatinine has improved a bit   He follows up with Dr. Allen \par \par #Normocytic anemia, related to chronic kidney disease and Revlimid was on   Procrit  60,000 units every  other  week and s/p IV iron .  On hold again  anemia is moderate range but over 9 and is symptomatic,  if worsens will consider restarting \par - will keep saturation over 20% and Ferritin over 100 if goes back on Procrit  \par \par \par # HTN \par - on Meds, will follow-up with his PCP for adjustment, stable today but systolic is still a bit high and he will reach out to Dr. Dooley\par \par RTC  with cycle  4

## 2021-04-26 NOTE — RESULTS/DATA
[FreeTextEntry1] :  Result Name Results Units Reference Range \par      PET CT WHOLE BODY TOP OF SKULL TO TI   SEE TEXT       \par     Patient Name: MOR, AUGUST : 1950\par Patient ID: [id number]\par Account: [id number]\par Patient Location: [facility]\par Accession: [id number]\par Procedure: PET CT WHOLE BODY TOP OF SKULL TO TIP OF TOES SUBSEQUENT 250-0036\par Date of Exam: 2017 11:09 AM\par Attending Physician: JARVIS LOW\par Requesting Physician: JARVIS LOW MD\par Clinical History / Reason for exam: FDG PET CT STUDY:\par Reason for examination: Tumor imaging - PET with concurrently acquired CT for\par attenuation correction and anatomic localization;  top of the skull  to toes/\par 42919 multiple myeloma, subsequent treatment strategy.\par ICD-10 code:C90.0\par History: 66-year-old male patient with history of multiple myeloma, last\par chemotherapy received was in 2017. Patient had bone biopsy on\par 2017.\par Blood glucose pre injection 102 mg/dL\par Technique:\par Approximately 45 minutes after the intravenous administration of 13.4  mCi\par 18-Fluorine FDG, whole body PET images were acquired from top of  the skull to\par toes. In addition, non-iv and non oral contrast, low dose, non - diagnostic CT\par was acquired for attenuation correction and anatomic correlation only.\par Findings:\par Comparison: Prior PET CT study done on July 3, 2015. Correlation was performed\par with the skeletal survey done on 2016.\par Head /Neck: Physiologic FDG uptake is seen in the visualized region of the\par brain, pharyngeal lymphoid tissue, and salivary glands.\par Chest:\par Physiologic FDG avid PET is seen in the mediastinal blood pool and myocardium.\par Chest wall: Unremarkable\par Lungs: No abnormal uptake.\par Mediastinum/Pleura/pericardium: No abnormal uptake.\par Thoracic/ axillary lymph nodes: No abnormal uptake.\par Hepatobiliary: Unremarkable.\par Gallbladder: Multiple gallstones.\par Spleen: No abnormal uptake\par Pancreas: No abnormal uptake.\par Adrenal glands: No abnormal uptake.\par Kidneys/ureters/bladder: Normal excretory activity is demonstrated.\par Abdominal pelvic lymph nodes: No abnormal uptake.\par Bowel/peritoneum/mesentery: No abnormal uptake.\par Pelvic organs: Unremarkable. No abnormal increased uptake. Prosthetic\par calcifications.\par Bones/soft tissues: Osteoarthritic changes are identified in the bones. \par Patient\par has multiple extensive lytic lesions in the skeletal, seen in the skeletal\par survey  for details see the report  on 2016.\par In the prior examination there was PET positive lytic lesion right fifth rib\par with a max SUV 2.6, now non-FDG avid   and L5 vertebral body on the left side\par with a max SUV 3.8, now non-FDG avid with multiple other scattered lytic\par lesions, non-FDG avid.\par No abnormal increased uptake is seen in the lower extremities.\par Impression:\par 1. No new areas of abnormal increased uptake is seen.\par 2. Previously seen abnormal increased uptake seen in the right fifth rib and \par L5\par vertebral body on the left side, at this time both regions are non-FDG avid.\par 3. Multiple extensive lytic lesions in the skeletal consistent with multiple\par myeloma.\par 4. No other areas of abnormal increased uptake is seen.\par I the attending attest that I have personally reviewed these images and agree\par with this report.\par \par  \par

## 2021-04-26 NOTE — HISTORY OF PRESENT ILLNESS
[de-identified] : REASON FOR FOLLOWUP:  Maintenance Velcade for lambda light chain myeloma, status post autologous stem cell transplant.\par \par TREATMENT HISTORY:  On 05/02/2015, he was diagnosed with lambda light chain multiple myeloma when he had a syncopal episode in [place] and was found to be in acute renal failure.  He underwent a bone marrow biopsy that confirmed lambda light chain multiple myeloma.  His beta2 microglobulin was initially 27.1 on diagnosis.  His lambda light chain prior to treatment was as high as 38364.2 on 02/27/2015.  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  There was also diffuse acute tubular injury and modular tubular atrophy with interstitial fibrosis initially on dialysis, but currently been off dialysis for approximately one year.\par \par His treatment summary is as follows.  He initiated Velcade, Cytoxan, and dexamethasone, cycle started on 03/13/2015.  He completed four cycles on 05/08/2015.  He then underwent an autologous bone marrow transplant in 07/2015 at [facility].  He has been on maintenance Velcade but in 6/17 was started on DRD due to  rising lambda with normal kappa.\par \par Mr. Motta is a very pleasant 66yearold gentleman with history of lambda light chain multiple myeloma, treated as above.\par  [FreeTextEntry1] : DRD started  on 6/21/17 went on  Revlimid maintenance 6/18/18 and stopped on 2/8/2021 Started Esthela Pom and Dexa on 3/1/21 [de-identified] : He presents today for followup she is currently on maintenance Velcade.his blood work from October 27 demonstrated a stage a faint lambda band on urine immunofixation. Nomi a count 7.66 hemoglobin 8.3 platelet count of 132. The SPEP was normal. Creatinine was stable at 5.93 potassium was 5.2 total protein 5.9 unremarkable LFTs free kappa lambda ratio normal 1.27 with free kappa at 42 and free lambda at 33.1. Most recent blood work from 1110 2016 shows a hemoglobin of 7.8. \par \par he was recently seen by Dr. Rod in  [place]. He had blood work done and reports that everything was stable. Dr. Rod also recommended to decrease his Velcade dose to 1.3 mg/m2\par \par He has no complaints. He denies any neuropathy.\par \par 1/4/17\par Presents for follow up today. Doing well on Maintenance Velcade.  No complaints. No neuropathy. \par \par 2/1/17\par He presents for follow up today. He has no complaints. Tolerating Velcade well.\par \par 4/5/17\par Doing well No complaints blood work from last visit was stable.\par \par 5/1/17\par He presents for follow up. He had increasing lambda light chains. Repeat was stable. A bone marrow showed minimal plasma cells and PET CT did not show any new activity.  He has no complaints.\par \par 6/5/2017\par Presents for follow up. He is doing well. He has some fatigue. His  Lambda has been slowly increasing. He has no other complaints.  No neuropathy\par \par 6/12/17\par He was brought back to discuss his lab work. His Lambda has been increasing with stable Kappa as well as slight worsening of his of creatinine He has no complaints.\par \par 7/12/17\par He has been on DRD. His HgB dropped to 5.9 and he is s/p PRBC transfusion.  He finished Revlimid on 7/9. His only complaint is leg crams at night. \par \par 8/9/17\par He is here for follow up. His cramps went away with the lower dose of Revlimid. His His lambda is coming down. He saw Dr. Rod last week as per patient.\par \par 8/30/27\par He is doing well. His legs cramps are better. He has severe anemia but he is asymptomatic. HIs creatinine is better and his light chain is at baseline.\par \par 9/27/17\par He is here for follow up. He needed  blood transfusion and hgb has been stable at 8 since than. He is also status post Venofer x2. He is on Procrit\par  40,000 units every 2 weeks. He feels well otherwise. \par \par 11/20/17\par Patient feels well and has no complaints. States he was taking Revlimid 5mg q48hrs prior to stopping for low counts. Patient will get Procrit and Darzalex. Patient will be restarted on Revilimid 2.5mg. Patient will continue every 2week Darzalex until week 25 then it becomes every 4 weeks. \par \par 12/18/17\par He is here for follow up. He is doing well. Blood work from last month still in CR. Cr and counts are better. He has no complaints and feel great.\par \par 1/22/18\par He is here for follow up. He is recovering from a cold. He is taking a prednisone taper given by his PMD. Had mules pain from cough.\par \par 2/26/18\par He is doing well. He has no complaints. His labs have been stable his light chain only went up slightly but ratio is normal. \par \par 3/27/18\par He is here for follow up he is doing well. He had blood work in [facility]  where he saw Dr. Rod 3/26/18 WBC 6.7, HgB 11.3, Pts 140, LFT WNL, , Ca 9.7. Cr. 5.96, alb 3.2\par \par He feels well. \par \par 4/23/18\par No events feels well. No complaints\par \par 5/21/18\par He is here for follow up. Doing well. Has no complaints\par \par 6/18/18\par His hgb has been dropping on monthly Procrit, His ligh chains are normal last Serum LUCIA from april 23 showed IgG kappa band thus he achieved aVGPR for DRD. He has no complaints.\par \par 7/16/18\par He is here for follow up. Doing well. No complaints. His kitten bit him.\par \par 8/13/18\par He is doing well. No complains. His HGb has been over 11 gm/dl So we are holding his procrit.\par \par 9/10/18\par He is here for follow up. He Myleoma panel is stable. Hgb is stable. He is due for his vaccinations 2 years post Auto and will have them today. He started Revlimid about 1 week ago. He has no complaints.\par \par 10/10/18\par He is here for follow up. He is doing well. BP is a bit high will watch. He has no complaints. He has his revlamid.\par \par 11/7/18\par He is here for follow up. His last hgb was under 10 gm/dl.  He is doing well otherwise. Was started on something for his blood pressure by Dr. Allen but he does not know what it is. He has his Revlimid\par \par 12/10/18\par Patient is here for a follow-up visit.  He is feeling well with no complaints.  Most recent CBC is stable.  Patient denies fever, chills, nausea, vomiting.  The light chains are picking up but it may be related to the CKD since LUCIA is negative and ratio is normal.\par \par 1/21/19\par He is here for follow-up with his wife.  His Kappa+Lambda have slightly risen, but the ratio is stable, so this is likely due to his CKD.   Most recent CBC is stable.  He received a prescription for Revlimid 5mg so he has been taking them every other day.  He is otherwise feeling well.  The rest of the CRAB criteria remains the same.  \par \par 2/25/19\par He is here with his wife.  As both his light chains are increasing, it is likely it is from his renal failure.  His most recent Hgb is 10.8g/dL, so we may stop when he nears 11.0g/dL.  He will see Dr. Rod on 3/25/19.   \par \par 3/20/19\par He is here for follow-up of MM with his wife.  His Hgb is stable at 11.3g/dL.  His BP is slightly elevated today. He will speak with his PCP.  He is otherwise feeling well.\par \par 4/22/19\par As of February, the immunofixation is picking up weak IgG Kappa bands no CRAB criteria  or significant rise in paraprotein that suggests progression .  We will continue with Revlimid at this time.   They went to see Dr. Rod from [facility] last month.   He feels well otherwise.\par \par 5/20/19\par He is here for follow up. He is doing well. His HgB is coming down so we will restart Procrit today. MM panel from March stable.\par \par 6/17/19\par He is here for follow-up of MM with his wife.  The MM panel from May was stable.  His hgb is 11.8g/dL today, so he does not need procrit.  He is feeling well. \par \par 7/15/19\par He is here for follow up. His  Myeloma panel from June is stable, Serum LUCIA remains negative.\par \par 8/12/19\par He is here for follow up. paraproteins from July are stable with negative serum LUCIA. His HgB is 10.9. he has no complaints. \par \par 9/16/19\par He is here for follow up. On day 4 of Revlimid. No complaints. HGB is stable. Serologically still in CR.\par \par 10/21/19\par he is here for follow up. No deviance of progression on last labs. he feels well.  On his off week of Revlimid. No complaints. to get his flu shot and pneumococcal vaccine with PMD next week.\par \par 11/18/19\par He is here for follow up of MM.  He is feeling well today.  He is seated with his wife.  He denies any bony pain or weight loss.  This is his off week for Revlimid.  No new complaints.  He is due to  start new cycle of Revlimid next Monday.  Hgb has improved to 11g/dL so we will hold procrit for now.  \par \par 12/23/19\par Patient is here for a follow-up visit for myeloma.  He is feeling well with no new complaints, tolerating revlimid 2.5 every other day.  This is his D2 of the next cycle of Revlimid, no refill needed at this time.  Most recent CBC is stable with hgb 11.2g/dL.  Patient denies fever, chills, nausea, vomiting, new pain or bleeding.  We will continue to hold Procrit for now.  \par \par 1/20/2020\par Patient is here for a follow-up visit for myeloma accompanied by his spouse.  He is feeling well with no new complaints, tolerating revlimid 2.5 every other day.  He has just begun the next cycle of Revlimid, no refill needed at this time.  Most recent CBC is stable with hgb 11.5g/dL.  Patient denies fever, chills, nausea, vomiting, new bony pain or bleeding.  We will continue to hold Procrit for now as his hgb is at target.  \par \par 2/24/2020\par Patient is here for a follow-up visit for myeloma accompanied by his spouse.   He is feeling well with no new complaints, tolerating revlimid 2.5 every other day.   Most recent CBC with hgb 10.8g/dL.  Myeloma panel from 01/2020 is stable with no quantifiable M-spike.  He is approaching week off of Revlimid and requesting refill.  \par \par 4/20/2020\par Patient is here for a follow-up visit for multiple myeloma.   He is feeling well with no new complaints, tolerating revlimid 2.5 every other day.   Most recent CBC with hgb 10.2g/dL.  Myeloma panel from 01/2020 remains with no quantifiable M-spike.   He has no complaints.\par \par \par 5/18/20\par He is here for follow up. he feels well. On revlamid. His last myeloma panel is without progression. His hgb is over 10 today.\par \par 7/20/20\par He is doing well he has no complaints. CBC showed stable hgb at 10.7. \par \par \par 8/31/20\par He is here for follow up. He is doing wel.. No isseus with Revlimid. Hgb remains over 10.  Myeloma labs remain unchanged with negative  serum LUCIA.\par \par \par 9/21/20\par He is here for follow up. His last Myeloma panel was unchanged alexandroley still in CR SIFE is negative  CBC is stable.  He is on revlmaid just recently started this cycle. \par \par 11/18/20\par He is here for follow up. Doing well. No complinats. His BP meds were just adjusted by Dr. Allen. On revlamid and reports no side effects. Limited exam today due to COVID 19\par \par 2/8/2021: The patient is here for follow up for his multiple myeloma .His last blood work showed increasing serum light chain. This was in November. He feels great and shoveled snow. His CBC from today is stable. Mild anemia. He is on his second week of Revlimid. \par \par \par 2/17/21\par He is  here for  follow up. He feels well. 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 He feesl well otherwise. His PET/CT is pending. Since he had cast nephropathy in the past we went over options and decide to switch treatment. \par \par 3/29/21\par He is here for follow up. He is on  due for week 5 of Dapa Pom and Dexa today. He had PET/CT on 2/25/21 that showed   Dapa Pom and Dexa. He is doing well. Hgb is just udner 10 and will monitor if continue to fall will consider  restarting Procrit. He has  no complaints.\par \par 4/26/2021: The patient is here for follow up for his multiple myeloma. He is on Daratumumab /Pomalyst and dexamethasone.  He feels well. He is on week 9 so will switch arianna to every 2 weeks. His Light chain is coming down. More anemic  but assymptomatic.\par

## 2021-04-29 LAB
ALBUMIN MFR SERPL ELPH: 61.3 %
ALBUMIN SERPL-MCNC: 3.5 G/DL
ALBUMIN/GLOB SERPL: 1.6 RATIO
ALPHA1 GLOB MFR SERPL ELPH: 6.4 %
ALPHA1 GLOB SERPL ELPH-MCNC: 0.4 G/DL
ALPHA2 GLOB MFR SERPL ELPH: 14.5 %
ALPHA2 GLOB SERPL ELPH-MCNC: 0.8 G/DL
B-GLOBULIN MFR SERPL ELPH: 10.8 %
B-GLOBULIN SERPL ELPH-MCNC: 0.6 G/DL
DEPRECATED KAPPA LC FREE/LAMBDA SER: 0.03 RATIO
GAMMA GLOB FLD ELPH-MCNC: 0.4 G/DL
GAMMA GLOB MFR SERPL ELPH: 7 %
IGA SER QL IEP: 45 MG/DL
IGG SER QL IEP: 564 MG/DL
IGM SER QL IEP: 13 MG/DL
INTERPRETATION SERPL IEP-IMP: NORMAL
KAPPA LC CSF-MCNC: 73.01 MG/DL
KAPPA LC SERPL-MCNC: 2.29 MG/DL
M PROTEIN MFR SERPL ELPH: NORMAL
M PROTEIN SPEC IFE-MCNC: NORMAL
MONOCLON BAND OBS SERPL: NORMAL
PROT SERPL-MCNC: 5.7 G/DL

## 2021-05-10 ENCOUNTER — OUTPATIENT (OUTPATIENT)
Dept: OUTPATIENT SERVICES | Facility: HOSPITAL | Age: 71
LOS: 1 days | Discharge: HOME | End: 2021-05-10

## 2021-05-10 ENCOUNTER — LABORATORY RESULT (OUTPATIENT)
Age: 71
End: 2021-05-10

## 2021-05-10 ENCOUNTER — APPOINTMENT (OUTPATIENT)
Dept: INFUSION THERAPY | Facility: CLINIC | Age: 71
End: 2021-05-10

## 2021-05-10 DIAGNOSIS — C90.00 MULTIPLE MYELOMA NOT HAVING ACHIEVED REMISSION: ICD-10-CM

## 2021-05-10 RX ORDER — DARATUMUMAB AND HYALURONIDASE-FIHJ (HUMAN RECOMBINANT) 1800; 30000 MG/15ML; U/15ML
15 INJECTION SUBCUTANEOUS ONCE
Refills: 0 | Status: COMPLETED | OUTPATIENT
Start: 2021-05-10 | End: 2021-05-10

## 2021-05-10 RX ADMIN — DARATUMUMAB AND HYALURONIDASE-FIHJ (HUMAN RECOMBINANT) 15 MILLILITER(S): 1800; 30000 INJECTION SUBCUTANEOUS at 09:48

## 2021-05-13 ENCOUNTER — NON-APPOINTMENT (OUTPATIENT)
Age: 71
End: 2021-05-13

## 2021-05-13 LAB
HCT VFR BLD CALC: 30.5 %
HGB BLD-MCNC: 9.7 G/DL
MCHC RBC-ENTMCNC: 26.2 PG
MCHC RBC-ENTMCNC: 31.8 G/DL
MCV RBC AUTO: 82.4 FL
PLATELET # BLD AUTO: 185 K/UL
PMV BLD: 9.5 FL
RBC # BLD: 3.7 M/UL
RBC # FLD: 21.4 %
WBC # FLD AUTO: 6.18 K/UL

## 2021-05-14 ENCOUNTER — NON-APPOINTMENT (OUTPATIENT)
Age: 71
End: 2021-05-14

## 2021-05-17 ENCOUNTER — APPOINTMENT (OUTPATIENT)
Dept: HEMATOLOGY ONCOLOGY | Facility: CLINIC | Age: 71
End: 2021-05-17

## 2021-05-17 ENCOUNTER — APPOINTMENT (OUTPATIENT)
Dept: INFUSION THERAPY | Facility: CLINIC | Age: 71
End: 2021-05-17

## 2021-05-24 ENCOUNTER — APPOINTMENT (OUTPATIENT)
Dept: HEMATOLOGY ONCOLOGY | Facility: CLINIC | Age: 71
End: 2021-05-24
Payer: MEDICARE

## 2021-05-24 ENCOUNTER — APPOINTMENT (OUTPATIENT)
Dept: INFUSION THERAPY | Facility: CLINIC | Age: 71
End: 2021-05-24
Payer: MEDICARE

## 2021-05-24 ENCOUNTER — LABORATORY RESULT (OUTPATIENT)
Age: 71
End: 2021-05-24

## 2021-05-24 VITALS
BODY MASS INDEX: 25.99 KG/M2 | DIASTOLIC BLOOD PRESSURE: 69 MMHG | HEART RATE: 68 BPM | TEMPERATURE: 97.9 F | HEIGHT: 65 IN | SYSTOLIC BLOOD PRESSURE: 158 MMHG | WEIGHT: 156 LBS

## 2021-05-24 PROCEDURE — 99214 OFFICE O/P EST MOD 30 MIN: CPT

## 2021-05-24 RX ORDER — AMLODIPINE BESYLATE 2.5 MG/1
2.5 TABLET ORAL
Qty: 90 | Refills: 0 | Status: DISCONTINUED | COMMUNITY
Start: 2018-10-29 | End: 2021-05-24

## 2021-05-24 RX ORDER — DARATUMUMAB AND HYALURONIDASE-FIHJ (HUMAN RECOMBINANT) 1800; 30000 MG/15ML; U/15ML
15 INJECTION SUBCUTANEOUS ONCE
Refills: 0 | Status: COMPLETED | OUTPATIENT
Start: 2021-05-24 | End: 2021-05-24

## 2021-05-24 RX ADMIN — DARATUMUMAB AND HYALURONIDASE-FIHJ (HUMAN RECOMBINANT) 15 MILLILITER(S): 1800; 30000 INJECTION SUBCUTANEOUS at 10:22

## 2021-05-24 NOTE — RESULTS/DATA
[FreeTextEntry1] :  Result Name Results Units Reference Range \par      PET CT WHOLE BODY TOP OF SKULL TO TI   SEE TEXT       \par     Patient Name: MOR, AUGUST : 1950\par Patient ID: [id number]\par Account: [id number]\par Patient Location: [facility]\par Accession: [id number]\par Procedure: PET CT WHOLE BODY TOP OF SKULL TO TIP OF TOES SUBSEQUENT [id number]\par Date of Exam: 2017 11:09 AM\par Attending Physician: JARVIS LOW\par Requesting Physician: JARVIS LOW MD\par Clinical History / Reason for exam: FDG PET CT STUDY:\par Reason for examination: Tumor imaging - PET with concurrently acquired CT for\par attenuation correction and anatomic localization;  top of the skull  to toes/\par [address]1875 multiple myeloma, subsequent treatment strategy.\par ICD-10 code:C90.0\par History: 66-year-old male patient with history of multiple myeloma, last\par chemotherapy received was in 2017. Patient had bone biopsy on\par 2017.\par Blood glucose pre injection 102 mg/dL\par Technique:\par Approximately 45 minutes after the intravenous administration of 13.4  mCi\par 18-Fluorine FDG, whole body PET images were acquired from top of  the skull to\par toes. In addition, non-iv and non oral contrast, low dose, non - diagnostic CT\par was acquired for attenuation correction and anatomic correlation only.\par Findings:\par Comparison: Prior PET CT study done on July 3, 2015. Correlation was performed\par with the skeletal survey done on 2016.\par Head /Neck: Physiologic FDG uptake is seen in the visualized region of the\par brain, pharyngeal lymphoid tissue, and salivary glands.\par Chest:\par Physiologic FDG avid PET is seen in the mediastinal blood pool and myocardium.\par Chest wall: Unremarkable\par Lungs: No abnormal uptake.\par Mediastinum/Pleura/pericardium: No abnormal uptake.\par Thoracic/ axillary lymph nodes: No abnormal uptake.\par Hepatobiliary: Unremarkable.\par Gallbladder: Multiple gallstones.\par Spleen: No abnormal uptake\par Pancreas: No abnormal uptake.\par Adrenal glands: No abnormal uptake.\par Kidneys/ureters/bladder: Normal excretory activity is demonstrated.\par Abdominal pelvic lymph nodes: No abnormal uptake.\par Bowel/peritoneum/mesentery: No abnormal uptake.\par Pelvic organs: Unremarkable. No abnormal increased uptake. Prosthetic\par calcifications.\par Bones/soft tissues: Osteoarthritic changes are identified in the bones. \par Patient\par has multiple extensive lytic lesions in the skeletal, seen in the skeletal\par survey  for details see the report  on 2016.\par In the prior examination there was PET positive lytic lesion right fifth rib\par with a max SUV 2.6, now non-FDG avid   and L5 vertebral body on the left side\par with a max SUV 3.8, now non-FDG avid with multiple other scattered lytic\par lesions, non-FDG avid.\par No abnormal increased uptake is seen in the lower extremities.\par Impression:\par 1. No new areas of abnormal increased uptake is seen.\par 2. Previously seen abnormal increased uptake seen in the right fifth rib and \par L5\par vertebral body on the left side, at this time both regions are non-FDG avid.\par 3. Multiple extensive lytic lesions in the skeletal consistent with multiple\par myeloma.\par 4. No other areas of abnormal increased uptake is seen.\par I the attending attest that I have personally reviewed these images and agree\par with this report.\par \par  \par

## 2021-05-24 NOTE — ASSESSMENT
[FreeTextEntry1] : # Free light chain multiple myeloma, specifically lambda.  Initial beta-2 was 27.1.  His bone marrow cytogenetics was normal.  He is status post four cycles of VCD, followed by autologous bone marrow transplant  07/2015 and weekly Velcade maintenance.\par Myeloma panel  showed increasing lambda light chains but marrow showed minimal plasma cells with  no progression on PET CT.  HIs lambda continues to increase supporting biochemical recurrence with worsening creatinine  He was started on daratumumab, Revlimid 5 mg daily d1-21 and  Dexamethasone  40 mg weekly PO with great response and most likely achieved  CR. Patient was taking Revlimid every other day and it was held for pancytopenia.  On 6/18/18 went on Revlimid maintenance . Achieved a VGPR/CR. His Lambda has been going up LUCIA is now positive and has Bence Philip is now positive and new Pelvic lytic lesion  on PET/CT on 2/25/21\par - On week 11 of  Daratumumab SC ervy 2 weeks  with Pomalyst 3 mg d1-21 every 28 days  and Dexamethasone weekly 40 mg. Involved light chain improving so is his renal function.  He is on ASA and Acyclovir. \par - PET/CT  showed a new lytic lesion in left iliac bone 2/25/21 but we decided to hold off Xgeva  for now but will start it if not responding to treatment.\par - Cbc , cmp serum immunoelectrophoresis today \par \par #Chronic kidney disease from multiple myeloma.  He has  been on dialysis in the past but is now off.  \par - creatinine has improved a bit   He follows up with Dr. Allen \par We will check CMP today\par \par # Normocytic anemia, related to chronic kidney disease and Revlimid . He was on   Procrit  60,000 units every  other week and s/p IV iron .  On hold again. His Hgb today is 8.8  and is asymptomatic;  if worsens will consider restarting Procrit.\par - Will keep saturation over 20% and Ferritin over 100 if goes back on Procrit  \par \par \par # HTN on medications, managed by Dr Allen, his nephrologist.\par \par RTC win 1 month\par The patient was seen and examined by Dr Dorsey ( covering for Dr Pantoja) who agreed with the above plan.

## 2021-05-24 NOTE — REVIEW OF SYSTEMS
[Negative] : ENT [Fever] : no fever [Chills] : no chills [Fatigue] : no fatigue [Dry Eyes] : no dryness of the eyes [Mucosal Pain] : no mucosal pain [Leg Claudication] : no intermittent leg claudication [Lower Ext Edema] : no lower extremity edema [SOB on Exertion] : no shortness of breath during exertion [Abdominal Pain] : no abdominal pain [Constipation] : no constipation [Muscle Pain] : no muscle pain

## 2021-05-24 NOTE — CONSULT LETTER
[Dear  ___] : Dear  [unfilled], [Courtesy Letter:] : I had the pleasure of seeing your patient, [unfilled], in my office today. [Please see my note below.] : Please see my note below. [Consult Closing:] : Thank you very much for allowing me to participate in the care of this patient.  If you have any questions, please do not hesitate to contact me. [Sincerely,] : Sincerely, [FreeTextEntry2] : Dr. Avi Allen [FreeTextEntry3] : Dr. TENNILLE Dorsey

## 2021-05-24 NOTE — HISTORY OF PRESENT ILLNESS
[Therapy: ___] : Therapy: [unfilled] [Cycle: ___] : Cycle: [unfilled] [de-identified] : REASON FOR FOLLOWUP:  Maintenance Velcade for lambda light chain myeloma, status post autologous stem cell transplant.\par \par TREATMENT HISTORY:  On 05/02/2015, he was diagnosed with lambda light chain multiple myeloma when he had a syncopal episode in [place] and was found to be in acute renal failure.  He underwent a bone marrow biopsy that confirmed lambda light chain multiple myeloma.  His beta2 microglobulin was initially 27.1 on diagnosis.  His lambda light chain prior to treatment was as high as 50304.2 on 02/27/2015.  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  There was also diffuse acute tubular injury and modular tubular atrophy with interstitial fibrosis initially on dialysis, but currently been off dialysis for approximately one year.\par \par His treatment summary is as follows.  He initiated Velcade, Cytoxan, and dexamethasone, cycle started on 03/13/2015.  He completed four cycles on 05/08/2015.  He then underwent an autologous bone marrow transplant in 07/2015 at [facility].  He has been on maintenance Velcade but in 6/17 was started on DRD due to  rising lambda with normal kappa.\par \par Mr. Motta is a very pleasant 66yearold gentleman with history of lambda light chain multiple myeloma, treated as above.\par  [FreeTextEntry1] : DRD started  on 6/21/17 went on  Revlimid maintenance 6/18/18 and stopped on 2/8/2021 Started Esthela Pom and Dexa on 3/1/21 [de-identified] : He presents today for followup she is currently on maintenance Velcade.his blood work from October 27 demonstrated a stage a faint lambda band on urine immunofixation. Nomi a count 7.66 hemoglobin 8.3 platelet count of 132. The SPEP was normal. Creatinine was stable at 5.93 potassium was 5.2 total protein 5.9 unremarkable LFTs free kappa lambda ratio normal 1.27 with free kappa at 42 and free lambda at 33.1. Most recent blood work from 1110 2016 shows a hemoglobin of 7.8. \par \par he was recently seen by Dr. Rod in  [place]. He had blood work done and reports that everything was stable. Dr. Rod also recommended to decrease his Velcade dose to 1.3 mg/m2\par \par He has no complaints. He denies any neuropathy.\par \par 1/4/17\par Presents for follow up today. Doing well on Maintenance Velcade.  No complaints. No neuropathy. \par \par 2/1/17\par He presents for follow up today. He has no complaints. Tolerating Velcade well.\par \par 4/5/17\par Doing well No complaints blood work from last visit was stable.\par \par 5/1/17\par He presents for follow up. He had increasing lambda light chains. Repeat was stable. A bone marrow showed minimal plasma cells and PET CT did not show any new activity.  He has no complaints.\par \par 6/5/2017\par Presents for follow up. He is doing well. He has some fatigue. His  Lambda has been slowly increasing. He has no other complaints.  No neuropathy\par \par 6/12/17\par He was brought back to discuss his lab work. His Lambda has been increasing with stable Kappa as well as slight worsening of his of creatinine He has no complaints.\par \par 7/12/17\par He has been on DRD. His HgB dropped to 5.9 and he is s/p PRBC transfusion.  He finished Revlimid on 7/9. His only complaint is leg crams at night. \par \par 8/9/17\par He is here for follow up. His cramps went away with the lower dose of Revlimid. His His lambda is coming down. He saw Dr. Rod last week as per patient.\par \par 8/30/27\par He is doing well. His legs cramps are better. He has severe anemia but he is asymptomatic. HIs creatinine is better and his light chain is at baseline.\par \par 9/27/17\par He is here for follow up. He needed  blood transfusion and hgb has been stable at 8 since than. He is also status post Venofer x2. He is on Procrit\par  40,000 units every 2 weeks. He feels well otherwise. \par \par 11/20/17\par Patient feels well and has no complaints. States he was taking Revlimid 5mg q48hrs prior to stopping for low counts. Patient will get Procrit and Darzalex. Patient will be restarted on Revilimid 2.5mg. Patient will continue every 2week Darzalex until week 25 then it becomes every 4 weeks. \par \par 12/18/17\par He is here for follow up. He is doing well. Blood work from last month still in CR. Cr and counts are better. He has no complaints and feel great.\par \par 1/22/18\par He is here for follow up. He is recovering from a cold. He is taking a prednisone taper given by his PMD. Had mules pain from cough.\par \par 2/26/18\par He is doing well. He has no complaints. His labs have been stable his light chain only went up slightly but ratio is normal. \par \par 3/27/18\par He is here for follow up he is doing well. He had blood work in [facility]  where he saw Dr. Rod 3/26/18 WBC 6.7, HgB 11.3, Pts 140, LFT WNL, , Ca 9.7. Cr. 5.96, alb 3.2\par \par He feels well. \par \par 4/23/18\par No events feels well. No complaints\par \par 5/21/18\par He is here for follow up. Doing well. Has no complaints\par \par 6/18/18\par His hgb has been dropping on monthly Procrit, His ligh chains are normal last Serum LUCIA from april 23 showed IgG kappa band thus he achieved aVGPR for DRD. He has no complaints.\par \par 7/16/18\par He is here for follow up. Doing well. No complaints. His kitten bit him.\par \par 8/13/18\par He is doing well. No complains. His HGb has been over 11 gm/dl So we are holding his procrit.\par \par 9/10/18\par He is here for follow up. He Myleoma panel is stable. Hgb is stable. He is due for his vaccinations 2 years post Auto and will have them today. He started Revlimid about 1 week ago. He has no complaints.\par \par 10/10/18\par He is here for follow up. He is doing well. BP is a bit high will watch. He has no complaints. He has his revlamid.\par \par 11/7/18\par He is here for follow up. His last hgb was under 10 gm/dl.  He is doing well otherwise. Was started on something for his blood pressure by Dr. Allen but he does not know what it is. He has his Revlimid\par \par 12/10/18\par Patient is here for a follow-up visit.  He is feeling well with no complaints.  Most recent CBC is stable.  Patient denies fever, chills, nausea, vomiting.  The light chains are picking up but it may be related to the CKD since LUCIA is negative and ratio is normal.\par \par 1/21/19\par He is here for follow-up with his wife.  His Kappa+Lambda have slightly risen, but the ratio is stable, so this is likely due to his CKD.   Most recent CBC is stable.  He received a prescription for Revlimid 5mg so he has been taking them every other day.  He is otherwise feeling well.  The rest of the CRAB criteria remains the same.  \par \par 2/25/19\par He is here with his wife.  As both his light chains are increasing, it is likely it is from his renal failure.  His most recent Hgb is 10.8g/dL, so we may stop when he nears 11.0g/dL.  He will see Dr. Rod on 3/25/19.   \par \par 3/20/19\par He is here for follow-up of MM with his wife.  His Hgb is stable at 11.3g/dL.  His BP is slightly elevated today. He will speak with his PCP.  He is otherwise feeling well.\par \par 4/22/19\par As of February, the immunofixation is picking up weak IgG Kappa bands no CRAB criteria  or significant rise in paraprotein that suggests progression .  We will continue with Revlimid at this time.   They went to see Dr. Rod from [facility] last month.   He feels well otherwise.\par \par 5/20/19\par He is here for follow up. He is doing well. His HgB is coming down so we will restart Procrit today. MM panel from March stable.\par \par 6/17/19\par He is here for follow-up of MM with his wife.  The MM panel from May was stable.  His hgb is 11.8g/dL today, so he does not need procrit.  He is feeling well. \par \par 7/15/19\par He is here for follow up. His  Myeloma panel from June is stable, Serum LUCIA remains negative.\par \par 8/12/19\par He is here for follow up. paraproteins from July are stable with negative serum LUCIA. His HgB is 10.9. he has no complaints. \par \par 9/16/19\par He is here for follow up. On day 4 of Revlimid. No complaints. HGB is stable. Serologically still in CR.\par \par 10/21/19\par he is here for follow up. No deviance of progression on last labs. he feels well.  On his off week of Revlimid. No complaints. to get his flu shot and pneumococcal vaccine with PMD next week.\par \par 11/18/19\par He is here for follow up of MM.  He is feeling well today.  He is seated with his wife.  He denies any bony pain or weight loss.  This is his off week for Revlimid.  No new complaints.  He is due to  start new cycle of Revlimid next Monday.  Hgb has improved to 11g/dL so we will hold procrit for now.  \par \par 12/23/19\par Patient is here for a follow-up visit for myeloma.  He is feeling well with no new complaints, tolerating revlimid 2.5 every other day.  This is his D2 of the next cycle of Revlimid, no refill needed at this time.  Most recent CBC is stable with hgb 11.2g/dL.  Patient denies fever, chills, nausea, vomiting, new pain or bleeding.  We will continue to hold Procrit for now.  \par \par 1/20/2020\par Patient is here for a follow-up visit for myeloma accompanied by his spouse.  He is feeling well with no new complaints, tolerating revlimid 2.5 every other day.  He has just begun the next cycle of Revlimid, no refill needed at this time.  Most recent CBC is stable with hgb 11.5g/dL.  Patient denies fever, chills, nausea, vomiting, new bony pain or bleeding.  We will continue to hold Procrit for now as his hgb is at target.  \par \par 2/24/2020\par Patient is here for a follow-up visit for myeloma accompanied by his spouse.   He is feeling well with no new complaints, tolerating revlimid 2.5 every other day.   Most recent CBC with hgb 10.8g/dL.  Myeloma panel from 01/2020 is stable with no quantifiable M-spike.  He is approaching week off of Revlimid and requesting refill.  \par \par 4/20/2020\par Patient is here for a follow-up visit for multiple myeloma.   He is feeling well with no new complaints, tolerating revlimid 2.5 every other day.   Most recent CBC with hgb 10.2g/dL.  Myeloma panel from 01/2020 remains with no quantifiable M-spike.   He has no complaints.\par \par \par 5/18/20\par He is here for follow up. he feels well. On revlamid. His last myeloma panel is without progression. His hgb is over 10 today.\par \par 7/20/20\par He is doing well he has no complaints. CBC showed stable hgb at 10.7. \par \par \par 8/31/20\par He is here for follow up. He is doing wel.. No isseus with Revlimid. Hgb remains over 10.  Myeloma labs remain unchanged with negative  serum LUCIA.\par \par \par 9/21/20\par He is here for follow up. His last Myeloma panel was unchanged likley still in CR SIFE is negative  CBC is stable.  He is on revlmaid just recently started this cycle. \par \par 11/18/20\par He is here for follow up. Doing well. No complinats. His BP meds were just adjusted by Dr. Allen. On revlamid and reports no side effects. Limited exam today due to COVID 19\par \par 2/8/2021: The patient is here for follow up for his multiple myeloma .His last blood work showed increasing serum light chain. This was in November. He feels great and shoveled snow. His CBC from today is stable. Mild anemia. He is on his second week of Revlimid. \par \par \par 2/17/21\par He is  here for  follow up. He feels well. 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 He feesl well otherwise. His PET/CT is pending. Since he had cast nephropathy in the past we went over options and decide to switch treatment. \par \par 3/29/21\par He is here for follow up. He is on  due for week 5 of Dapa Pom and Dexa today. He had PET/CT on 2/25/21 that showed   Dapa Pom and Dexa. He is doing well. Hgb is just udner 10 and will monitor if continue to fall will consider  restarting Procrit. He has  no complaints.\par \par 4/26/2021: The patient is here for follow up for his multiple myeloma. He is on Daratumumab /Pomalyst and dexamethasone.  He feels well. He is on week 9 so will switch arianna to every 2 weeks. His Light chain is coming down. More anemic  but assymptomatic.\par \par 5/24/2021: The patient is here for follow up for his MM. He is on daratumumab, pomalyst and dexamethasone, tolerating well, with no major side effects. His light chains are decreasing . His Hgb is 8.8 today (slightly lower than previous) , but he feels fine with no complaints. No other issues today.

## 2021-06-01 ENCOUNTER — NON-APPOINTMENT (OUTPATIENT)
Age: 71
End: 2021-06-01

## 2021-06-01 LAB
ALBUMIN MFR SERPL ELPH: 61 %
ALBUMIN SERPL ELPH-MCNC: 3.9 G/DL
ALBUMIN SERPL-MCNC: 3.4 G/DL
ALBUMIN/GLOB SERPL: 1.6 RATIO
ALP BLD-CCNC: 68 U/L
ALPHA1 GLOB MFR SERPL ELPH: 7.7 %
ALPHA1 GLOB SERPL ELPH-MCNC: 0.4 G/DL
ALPHA2 GLOB MFR SERPL ELPH: 14.7 %
ALPHA2 GLOB SERPL ELPH-MCNC: 0.8 G/DL
ALT SERPL-CCNC: 9 U/L
ANION GAP SERPL CALC-SCNC: 14 MMOL/L
AST SERPL-CCNC: 8 U/L
B-GLOBULIN MFR SERPL ELPH: 10.5 %
B-GLOBULIN SERPL ELPH-MCNC: 0.6 G/DL
BILIRUB SERPL-MCNC: <0.2 MG/DL
BUN SERPL-MCNC: 53 MG/DL
CALCIUM SERPL-MCNC: 8.7 MG/DL
CHLORIDE SERPL-SCNC: 108 MMOL/L
CO2 SERPL-SCNC: 19 MMOL/L
CREAT SERPL-MCNC: 4.7 MG/DL
DEPRECATED KAPPA LC FREE/LAMBDA SER: 0.04 RATIO
GAMMA GLOB FLD ELPH-MCNC: 0.3 G/DL
GAMMA GLOB MFR SERPL ELPH: 6.1 %
GLUCOSE SERPL-MCNC: 129 MG/DL
HCT VFR BLD CALC: 28.7 %
HGB BLD-MCNC: 8.8 G/DL
IGA SER QL IEP: 43 MG/DL
IGG SER QL IEP: 417 MG/DL
IGM SER QL IEP: 11 MG/DL
INTERPRETATION SERPL IEP-IMP: NORMAL
KAPPA LC CSF-MCNC: 56.47 MG/DL
KAPPA LC SERPL-MCNC: 2.09 MG/DL
M PROTEIN MFR SERPL ELPH: 1.7 %
M PROTEIN SPEC IFE-MCNC: NORMAL
MCHC RBC-ENTMCNC: 26.3 PG
MCHC RBC-ENTMCNC: 30.7 G/DL
MCV RBC AUTO: 85.7 FL
MONOCLON BAND OBS SERPL: 0.1 G/DL
PLATELET # BLD AUTO: 208 K/UL
PMV BLD: 9.5 FL
POTASSIUM SERPL-SCNC: 4.8 MMOL/L
PROT SERPL-MCNC: 5.5 G/DL
PROT SERPL-MCNC: 5.5 G/DL
RBC # BLD: 3.35 M/UL
RBC # FLD: 20.8 %
SODIUM SERPL-SCNC: 141 MMOL/L
WBC # FLD AUTO: 7.39 K/UL

## 2021-06-07 ENCOUNTER — APPOINTMENT (OUTPATIENT)
Dept: INFUSION THERAPY | Facility: CLINIC | Age: 71
End: 2021-06-07

## 2021-06-07 ENCOUNTER — LABORATORY RESULT (OUTPATIENT)
Age: 71
End: 2021-06-07

## 2021-06-07 LAB
ALBUMIN SERPL ELPH-MCNC: 4.2 G/DL
ALP BLD-CCNC: 81 U/L
ALT SERPL-CCNC: 9 U/L
ANION GAP SERPL CALC-SCNC: 16 MMOL/L
AST SERPL-CCNC: 7 U/L
BILIRUB SERPL-MCNC: <0.2 MG/DL
BUN SERPL-MCNC: 63 MG/DL
CALCIUM SERPL-MCNC: 9.4 MG/DL
CHLORIDE SERPL-SCNC: 107 MMOL/L
CO2 SERPL-SCNC: 17 MMOL/L
CREAT SERPL-MCNC: 4.7 MG/DL
GLUCOSE SERPL-MCNC: 107 MG/DL
HCT VFR BLD CALC: 31 %
HGB BLD-MCNC: 9.8 G/DL
MCHC RBC-ENTMCNC: 26.8 PG
MCHC RBC-ENTMCNC: 31.6 G/DL
MCV RBC AUTO: 84.7 FL
PLATELET # BLD AUTO: 212 K/UL
PMV BLD: 9 FL
POTASSIUM SERPL-SCNC: 4.5 MMOL/L
PROT SERPL-MCNC: 6.1 G/DL
RBC # BLD: 3.66 M/UL
RBC # FLD: 20.1 %
SODIUM SERPL-SCNC: 140 MMOL/L
WBC # FLD AUTO: 9.29 K/UL

## 2021-06-07 RX ORDER — DARATUMUMAB AND HYALURONIDASE-FIHJ (HUMAN RECOMBINANT) 1800; 30000 MG/15ML; U/15ML
15 INJECTION SUBCUTANEOUS ONCE
Refills: 0 | Status: COMPLETED | OUTPATIENT
Start: 2021-06-07 | End: 2021-06-07

## 2021-06-07 RX ADMIN — DARATUMUMAB AND HYALURONIDASE-FIHJ (HUMAN RECOMBINANT) 15 MILLILITER(S): 1800; 30000 INJECTION SUBCUTANEOUS at 09:51

## 2021-06-10 ENCOUNTER — NON-APPOINTMENT (OUTPATIENT)
Age: 71
End: 2021-06-10

## 2021-06-15 ENCOUNTER — RX RENEWAL (OUTPATIENT)
Age: 71
End: 2021-06-15

## 2021-06-15 LAB
ALBUMIN MFR SERPL ELPH: 62.6 %
ALBUMIN SERPL-MCNC: 3.8 G/DL
ALBUMIN/GLOB SERPL: 1.7 RATIO
ALPHA1 GLOB MFR SERPL ELPH: 6.9 %
ALPHA1 GLOB SERPL ELPH-MCNC: 0.4 G/DL
ALPHA2 GLOB MFR SERPL ELPH: 13.5 %
ALPHA2 GLOB SERPL ELPH-MCNC: 0.8 G/DL
B-GLOBULIN MFR SERPL ELPH: 10.9 %
B-GLOBULIN SERPL ELPH-MCNC: 0.7 G/DL
DEPRECATED KAPPA LC FREE/LAMBDA SER: 0.05 RATIO
GAMMA GLOB FLD ELPH-MCNC: 0.4 G/DL
GAMMA GLOB MFR SERPL ELPH: 6.1 %
IGA SER QL IEP: 52 MG/DL
IGG SER QL IEP: 406 MG/DL
IGM SER QL IEP: <10 MG/DL
INTERPRETATION SERPL IEP-IMP: NORMAL
KAPPA LC CSF-MCNC: 49.84 MG/DL
KAPPA LC SERPL-MCNC: 2.52 MG/DL
M PROTEIN MFR SERPL ELPH: 2.1 %
M PROTEIN SPEC IFE-MCNC: NORMAL
MONOCLON BAND OBS SERPL: 0.1 G/DL
PROT SERPL-MCNC: 6 G/DL
PROT SERPL-MCNC: 6 G/DL

## 2021-06-15 RX ORDER — DEXAMETHASONE 4 MG/1
4 TABLET ORAL
Qty: 30 | Refills: 2 | Status: ACTIVE | COMMUNITY
Start: 2021-02-17 | End: 1900-01-01

## 2021-06-21 ENCOUNTER — APPOINTMENT (OUTPATIENT)
Dept: HEMATOLOGY ONCOLOGY | Facility: CLINIC | Age: 71
End: 2021-06-21
Payer: MEDICARE

## 2021-06-21 ENCOUNTER — LABORATORY RESULT (OUTPATIENT)
Age: 71
End: 2021-06-21

## 2021-06-21 ENCOUNTER — APPOINTMENT (OUTPATIENT)
Dept: INFUSION THERAPY | Facility: CLINIC | Age: 71
End: 2021-06-21

## 2021-06-21 VITALS
SYSTOLIC BLOOD PRESSURE: 155 MMHG | BODY MASS INDEX: 26.49 KG/M2 | RESPIRATION RATE: 14 BRPM | WEIGHT: 159 LBS | HEART RATE: 78 BPM | DIASTOLIC BLOOD PRESSURE: 72 MMHG | HEIGHT: 65 IN | TEMPERATURE: 97.6 F

## 2021-06-21 DIAGNOSIS — Z51.11 ENCOUNTER FOR ANTINEOPLASTIC CHEMOTHERAPY: ICD-10-CM

## 2021-06-21 DIAGNOSIS — D63.1 CHRONIC KIDNEY DISEASE, UNSPECIFIED: ICD-10-CM

## 2021-06-21 DIAGNOSIS — N18.9 CHRONIC KIDNEY DISEASE, UNSPECIFIED: ICD-10-CM

## 2021-06-21 DIAGNOSIS — C90.00 MULTIPLE MYELOMA NOT HAVING ACHIEVED REMISSION: ICD-10-CM

## 2021-06-21 LAB
ANION GAP SERPL CALC-SCNC: 14 MMOL/L
BUN SERPL-MCNC: 55 MG/DL
CALCIUM SERPL-MCNC: 8.7 MG/DL
CHLORIDE SERPL-SCNC: 111 MMOL/L
CO2 SERPL-SCNC: 17 MMOL/L
CREAT SERPL-MCNC: 4.5 MG/DL
GLUCOSE SERPL-MCNC: 109 MG/DL
HCT VFR BLD CALC: 31 %
HGB BLD-MCNC: 9.7 G/DL
MCHC RBC-ENTMCNC: 27.2 PG
MCHC RBC-ENTMCNC: 31.3 G/DL
MCV RBC AUTO: 87.1 FL
PLATELET # BLD AUTO: 240 K/UL
PMV BLD: 10 FL
POTASSIUM SERPL-SCNC: 4.8 MMOL/L
RBC # BLD: 3.56 M/UL
RBC # FLD: 19.6 %
SODIUM SERPL-SCNC: 142 MMOL/L
WBC # FLD AUTO: 7.04 K/UL

## 2021-06-21 PROCEDURE — 99214 OFFICE O/P EST MOD 30 MIN: CPT

## 2021-06-21 RX ORDER — DARATUMUMAB AND HYALURONIDASE-FIHJ (HUMAN RECOMBINANT) 1800; 30000 MG/15ML; U/15ML
15 INJECTION SUBCUTANEOUS ONCE
Refills: 0 | Status: COMPLETED | OUTPATIENT
Start: 2021-06-21 | End: 2021-06-21

## 2021-06-21 RX ADMIN — DARATUMUMAB AND HYALURONIDASE-FIHJ (HUMAN RECOMBINANT) 15 MILLILITER(S): 1800; 30000 INJECTION SUBCUTANEOUS at 09:55

## 2021-06-22 NOTE — HISTORY OF PRESENT ILLNESS
[Therapy: ___] : Therapy: [unfilled] [Cycle: ___] : Cycle: [unfilled] [de-identified] : REASON FOR FOLLOWUP:  Maintenance Velcade for lambda light chain myeloma, status post autologous stem cell transplant.\par \par TREATMENT HISTORY:  On 05/02/2015, he was diagnosed with lambda light chain multiple myeloma when he had a syncopal episode in [place] and was found to be in acute renal failure.  He underwent a bone marrow biopsy that confirmed lambda light chain multiple myeloma.  His beta2 microglobulin was initially 27.1 on diagnosis.  His lambda light chain prior to treatment was as high as 70591.2 on 02/27/2015.  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  There was also diffuse acute tubular injury and modular tubular atrophy with interstitial fibrosis initially on dialysis, but currently been off dialysis for approximately one year.\par \par His treatment summary is as follows.  He initiated Velcade, Cytoxan, and dexamethasone, cycle started on 03/13/2015.  He completed four cycles on 05/08/2015.  He then underwent an autologous bone marrow transplant in 07/2015 at [place].  He has been on maintenance Velcade but in 6/17 was started on DRD due to  rising lambda with normal kappa.\par \par Mr. Motta is a very pleasant 66yearold gentleman with history of lambda light chain multiple myeloma, treated as above.\par  [FreeTextEntry1] : DRD started  on 6/21/17 went on  Revlimid maintenance 6/18/18 and stopped on 2/8/2021 Started Esthela Pom and Dexa on 3/1/21 [de-identified] : He presents today for followup she is currently on maintenance Velcade.his blood work from October 27 demonstrated a stage a faint lambda band on urine immunofixation. Nomi a count 7.66 hemoglobin 8.3 platelet count of 132. The SPEP was normal. Creatinine was stable at 5.93 potassium was 5.2 total protein 5.9 unremarkable LFTs free kappa lambda ratio normal 1.27 with free kappa at 42 and free lambda at 33.1. Most recent blood work from 1110 2016 shows a hemoglobin of 7.8. \par \par he was recently seen by Dr. Rod in  [place]. He had blood work done and reports that everything was stable. Dr. Rod also recommended to decrease his Velcade dose to 1.3 mg/m2\par \par He has no complaints. He denies any neuropathy.\par \par 1/4/17\par Presents for follow up today. Doing well on Maintenance Velcade.  No complaints. No neuropathy. \par \par 2/1/17\par He presents for follow up today. He has no complaints. Tolerating Velcade well.\par \par 4/5/17\par Doing well No complaints blood work from last visit was stable.\par \par 5/1/17\par He presents for follow up. He had increasing lambda light chains. Repeat was stable. A bone marrow showed minimal plasma cells and PET CT did not show any new activity.  He has no complaints.\par \par 6/5/2017\par Presents for follow up. He is doing well. He has some fatigue. His  Lambda has been slowly increasing. He has no other complaints.  No neuropathy\par \par 6/12/17\par He was brought back to discuss his lab work. His Lambda has been increasing with stable Kappa as well as slight worsening of his of creatinine He has no complaints.\par \par 7/12/17\par He has been on DRD. His HgB dropped to 5.9 and he is s/p PRBC transfusion.  He finished Revlimid on 7/9. His only complaint is leg crams at night. \par \par 8/9/17\par He is here for follow up. His cramps went away with the lower dose of Revlimid. His His lambda is coming down. He saw Dr. Rod last week as per patient.\par \par 8/30/27\par He is doing well. His legs cramps are better. He has severe anemia but he is asymptomatic. HIs creatinine is better and his light chain is at baseline.\par \par 9/27/17\par He is here for follow up. He needed  blood transfusion and hgb has been stable at 8 since than. He is also status post Venofer x2. He is on Procrit\par  40,000 units every 2 weeks. He feels well otherwise. \par \par 11/20/17\par Patient feels well and has no complaints. States he was taking Revlimid 5mg q48hrs prior to stopping for low counts. Patient will get Procrit and Darzalex. Patient will be restarted on Revilimid 2.5mg. Patient will continue every 2week Darzalex until week 25 then it becomes every 4 weeks. \par \par 12/18/17\par He is here for follow up. He is doing well. Blood work from last month still in CR. Cr and counts are better. He has no complaints and feel great.\par \par 1/22/18\par He is here for follow up. He is recovering from a cold. He is taking a prednisone taper given by his PMD. Had mules pain from cough.\par \par 2/26/18\par He is doing well. He has no complaints. His labs have been stable his light chain only went up slightly but ratio is normal. \par \par 3/27/18\par He is here for follow up he is doing well. He had blood work in [facility]  where he saw Dr. Rod 3/26/18 WBC 6.7, HgB 11.3, Pts 140, LFT WNL, , Ca 9.7. Cr. 5.96, alb 3.2\par \par He feels well. \par \par 4/23/18\par No events feels well. No complaints\par \par 5/21/18\par He is here for follow up. Doing well. Has no complaints\par \par 6/18/18\par His hgb has been dropping on monthly Procrit, His ligh chains are normal last Serum LUCIA from april 23 showed IgG kappa band thus he achieved aVGPR for DRD. He has no complaints.\par \par 7/16/18\par He is here for follow up. Doing well. No complaints. His kitten bit him.\par \par 8/13/18\par He is doing well. No complains. His HGb has been over 11 gm/dl So we are holding his procrit.\par \par 9/10/18\par He is here for follow up. He Myleoma panel is stable. Hgb is stable. He is due for his vaccinations 2 years post Auto and will have them today. He started Revlimid about 1 week ago. He has no complaints.\par \par 10/10/18\par He is here for follow up. He is doing well. BP is a bit high will watch. He has no complaints. He has his revlamid.\par \par 11/7/18\par He is here for follow up. His last hgb was under 10 gm/dl.  He is doing well otherwise. Was started on something for his blood pressure by Dr. Allen but he does not know what it is. He has his Revlimid\par \par 12/10/18\par Patient is here for a follow-up visit.  He is feeling well with no complaints.  Most recent CBC is stable.  Patient denies fever, chills, nausea, vomiting.  The light chains are picking up but it may be related to the CKD since LUCIA is negative and ratio is normal.\par \par 1/21/19\par He is here for follow-up with his wife.  His Kappa+Lambda have slightly risen, but the ratio is stable, so this is likely due to his CKD.   Most recent CBC is stable.  He received a prescription for Revlimid 5mg so he has been taking them every other day.  He is otherwise feeling well.  The rest of the CRAB criteria remains the same.  \par \par 2/25/19\par He is here with his wife.  As both his light chains are increasing, it is likely it is from his renal failure.  His most recent Hgb is 10.8g/dL, so we may stop when he nears 11.0g/dL.  He will see Dr. Rod on 3/25/19.   \par \par 3/20/19\par He is here for follow-up of MM with his wife.  His Hgb is stable at 11.3g/dL.  His BP is slightly elevated today. He will speak with his PCP.  He is otherwise feeling well.\par \par 4/22/19\par As of February, the immunofixation is picking up weak IgG Kappa bands no CRAB criteria  or significant rise in paraprotein that suggests progression .  We will continue with Revlimid at this time.   They went to see Dr. Rod from [facility] last month.   He feels well otherwise.\par \par 5/20/19\par He is here for follow up. He is doing well. His HgB is coming down so we will restart Procrit today. MM panel from March stable.\par \par 6/17/19\par He is here for follow-up of MM with his wife.  The MM panel from May was stable.  His hgb is 11.8g/dL today, so he does not need procrit.  He is feeling well. \par \par 7/15/19\par He is here for follow up. His  Myeloma panel from June is stable, Serum LUCIA remains negative.\par \par 8/12/19\par He is here for follow up. paraproteins from July are stable with negative serum LUCIA. His HgB is 10.9. he has no complaints. \par \par 9/16/19\par He is here for follow up. On day 4 of Revlimid. No complaints. HGB is stable. Serologically still in CR.\par \par 10/21/19\par he is here for follow up. No deviance of progression on last labs. he feels well.  On his off week of Revlimid. No complaints. to get his flu shot and pneumococcal vaccine with PMD next week.\par \par 11/18/19\par He is here for follow up of MM.  He is feeling well today.  He is seated with his wife.  He denies any bony pain or weight loss.  This is his off week for Revlimid.  No new complaints.  He is due to  start new cycle of Revlimid next Monday.  Hgb has improved to 11g/dL so we will hold procrit for now.  \par \par 12/23/19\par Patient is here for a follow-up visit for myeloma.  He is feeling well with no new complaints, tolerating revlimid 2.5 every other day.  This is his D2 of the next cycle of Revlimid, no refill needed at this time.  Most recent CBC is stable with hgb 11.2g/dL.  Patient denies fever, chills, nausea, vomiting, new pain or bleeding.  We will continue to hold Procrit for now.  \par \par 1/20/2020\par Patient is here for a follow-up visit for myeloma accompanied by his spouse.  He is feeling well with no new complaints, tolerating revlimid 2.5 every other day.  He has just begun the next cycle of Revlimid, no refill needed at this time.  Most recent CBC is stable with hgb 11.5g/dL.  Patient denies fever, chills, nausea, vomiting, new bony pain or bleeding.  We will continue to hold Procrit for now as his hgb is at target.  \par \par 2/24/2020\par Patient is here for a follow-up visit for myeloma accompanied by his spouse.   He is feeling well with no new complaints, tolerating revlimid 2.5 every other day.   Most recent CBC with hgb 10.8g/dL.  Myeloma panel from 01/2020 is stable with no quantifiable M-spike.  He is approaching week off of Revlimid and requesting refill.  \par \par 4/20/2020\par Patient is here for a follow-up visit for multiple myeloma.   He is feeling well with no new complaints, tolerating revlimid 2.5 every other day.   Most recent CBC with hgb 10.2g/dL.  Myeloma panel from 01/2020 remains with no quantifiable M-spike.   He has no complaints.\par \par \par 5/18/20\par He is here for follow up. he feels well. On revlamid. His last myeloma panel is without progression. His hgb is over 10 today.\par \par 7/20/20\par He is doing well he has no complaints. CBC showed stable hgb at 10.7. \par \par \par 8/31/20\par He is here for follow up. He is doing wel.. No isseus with Revlimid. Hgb remains over 10.  Myeloma labs remain unchanged with negative  serum LUCIA.\par \par \par 9/21/20\par He is here for follow up. His last Myeloma panel was unchanged likley still in CR SIFE is negative  CBC is stable.  He is on revlmaid just recently started this cycle. \par \par 11/18/20\par He is here for follow up. Doing well. No complinats. His BP meds were just adjusted by Dr. Allen. On revlamid and reports no side effects. Limited exam today due to COVID 19\par \par 2/8/2021: The patient is here for follow up for his multiple myeloma .His last blood work showed increasing serum light chain. This was in November. He feels great and shoveled snow. His CBC from today is stable. Mild anemia. He is on his second week of Revlimid. \par \par \par 2/17/21\par He is  here for  follow up. He feels well. 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 He feesl well otherwise. His PET/CT is pending. Since he had cast nephropathy in the past we went over options and decide to switch treatment. \par \par 3/29/21\par He is here for follow up. He is on  due for week 5 of Dapa Pom and Dexa today. He had PET/CT on 2/25/21 that showed   Dapa Pom and Dexa. He is doing well. Hgb is just udner 10 and will monitor if continue to fall will consider  restarting Procrit. He has  no complaints.\par \par 4/26/2021: The patient is here for follow up for his multiple myeloma. He is on Daratumumab /Pomalyst and dexamethasone.  He feels well. He is on week 9 so will switch arianna to every 2 weeks. His Light chain is coming down. More anemic  but assymptomatic.\par \par 5/24/2021: The patient is here for follow up for his MM. He is on daratumumab, pomalyst and dexamethasone, tolerating well, with no major side effects. His light chains are decreasing . His Hgb is 8.8 today (slightly lower than previous) , but he feels fine with no complaints. No other issues today.\par \par 6/21/21\par He is here for follow up. Blood work from last visit showed a fall in his light chain,  creatinine stable at 4.7and  hgb was near 10 and now no changed. He feels well. He tells me he is moving to [facility] around July 15 and are looking for a doctor near by.\par

## 2021-06-22 NOTE — CONSULT LETTER
[Dear  ___] : Dear  [unfilled], [Courtesy Letter:] : I had the pleasure of seeing your patient, [unfilled], in my office today. [Please see my note below.] : Please see my note below. [Consult Closing:] : Thank you very much for allowing me to participate in the care of this patient.  If you have any questions, please do not hesitate to contact me. [Sincerely,] : Sincerely, [FreeTextEntry2] : Dr. Avi Allen [FreeTextEntry3] : Andrew

## 2021-06-22 NOTE — RESULTS/DATA
[FreeTextEntry1] :  Result Name Results Units Reference Range \par      PET CT WHOLE BODY TOP OF SKULL TO TI   SEE TEXT       \par     Patient Name: MOR, AUGUST : 1950\par Patient ID: [id number]\par Account: [id number]\par Patient Location: [facility]\par Accession: [id number]\par Procedure: PET CT WHOLE BODY TOP OF SKULL TO TIP OF TOES SUBSEQUENT [id number]\par Date of Exam: 2017 11:09 AM\par Attending Physician: JARVIS LOW\par Requesting Physician: JARVIS LOW MD\par Clinical History / Reason for exam: FDG PET CT STUDY:\par Reason for examination: Tumor imaging - PET with concurrently acquired CT for\par attenuation correction and anatomic localization;  top of the skull  to toes/\par [address]951 multiple myeloma, subsequent treatment strategy.\par ICD-10 code:C90.0\par History: 66-year-old male patient with history of multiple myeloma, last\par chemotherapy received was in 2017. Patient had bone biopsy on\par 2017.\par Blood glucose pre injection 102 mg/dL\par Technique:\par Approximately 45 minutes after the intravenous administration of 13.4  mCi\par 18-Fluorine FDG, whole body PET images were acquired from top of  the skull to\par toes. In addition, non-iv and non oral contrast, low dose, non - diagnostic CT\par was acquired for attenuation correction and anatomic correlation only.\par Findings:\par Comparison: Prior PET CT study done on July 3, 2015. Correlation was performed\par with the skeletal survey done on 2016.\par Head /Neck: Physiologic FDG uptake is seen in the visualized region of the\par brain, pharyngeal lymphoid tissue, and salivary glands.\par Chest:\par Physiologic FDG avid PET is seen in the mediastinal blood pool and myocardium.\par Chest wall: Unremarkable\par Lungs: No abnormal uptake.\par Mediastinum/Pleura/pericardium: No abnormal uptake.\par Thoracic/ axillary lymph nodes: No abnormal uptake.\par Hepatobiliary: Unremarkable.\par Gallbladder: Multiple gallstones.\par Spleen: No abnormal uptake\par Pancreas: No abnormal uptake.\par Adrenal glands: No abnormal uptake.\par Kidneys/ureters/bladder: Normal excretory activity is demonstrated.\par Abdominal pelvic lymph nodes: No abnormal uptake.\par Bowel/peritoneum/mesentery: No abnormal uptake.\par Pelvic organs: Unremarkable. No abnormal increased uptake. Prosthetic\par calcifications.\par Bones/soft tissues: Osteoarthritic changes are identified in the bones. \par Patient\par has multiple extensive lytic lesions in the skeletal, seen in the skeletal\par survey  for details see the report  on 2016.\par In the prior examination there was PET positive lytic lesion right fifth rib\par with a max SUV 2.6, now non-FDG avid   and L5 vertebral body on the left side\par with a max SUV 3.8, now non-FDG avid with multiple other scattered lytic\par lesions, non-FDG avid.\par No abnormal increased uptake is seen in the lower extremities.\par Impression:\par 1. No new areas of abnormal increased uptake is seen.\par 2. Previously seen abnormal increased uptake seen in the right fifth rib and \par L5\par vertebral body on the left side, at this time both regions are non-FDG avid.\par 3. Multiple extensive lytic lesions in the skeletal consistent with multiple\par myeloma.\par 4. No other areas of abnormal increased uptake is seen.\par I the attending attest that I have personally reviewed these images and agree\par with this report.\par \par  \par

## 2021-06-22 NOTE — ASSESSMENT
[FreeTextEntry1] : # Free light chain multiple myeloma, specifically lambda.  Initial beta-2 was 27.1.  His bone marrow cytogenetics was normal.  He is status post four cycles of VCD, followed by autologous bone marrow transplant  07/2015 and weekly Velcade maintenance.\par Myeloma panel  showed increasing lambda light chains but marrow showed minimal plasma cells with  no progression on PET CT.  HIs lambda continues to increase supporting biochemical recurrence with worsening creatinine  He was started on daratumumab, Revlimid 5 mg daily d1-21 and  Dexamethasone  40 mg weekly PO with great response and most likely achieved  CR. Patient was taking Revlimid every other day and it was held for pancytopenia.  On 6/18/18 went on Revlimid maintenance . Achieved a VGPR/CR. His Lambda has been going up LUCIA is now positive and has Bence Philip is now positive and new Pelvic lytic lesion  on PET/CT on 2/25/21\par - due for 5th of 8 biweekly dose today  of  Daratumumab SC ervy 2 weeks  with Pomalyst 3 mg d1-21 every 28 days  and Dexamethasone weekly 40 mg. Involved light chain improving so is his renal function.  He is on ASA and Acyclovir. \par - PET/CT  showed a new lytic lesion in left iliac bone 2/25/21 but we decided to hold off Xgeva  for now but will start it if not responding to treatment.\par -will c.w treatment until he moves.\par - Cbc , cmp serum immunoelectrophoresis today \par \par #Chronic kidney disease from multiple myeloma.  He has  been on dialysis in the past but is now off.  \par - creatinine has improved a bit   He follows up with Dr. Allen \par We will check CMP today\par \par # Normocytic anemia, related to chronic kidney disease and Revlimid . He was on   Procrit  60,000 units every  other week and s/p IV iron .  On hold again. His Hgb today  and is asymptomatic;  if worsens will consider restarting Procrit.\par - Will keep saturation over 20% and Ferritin over 100 if goes back on Procrit  \par \par \par # HTN on medications, managed by Dr Allen, his nephrologist.\par \par RTC not arranged  will obtain an oncologist in [place] and once I know who it is will reach out to them and send over all the records. Will get  dose of arianna in 2 weeks with us.\par

## 2021-06-24 LAB
ALBUMIN MFR SERPL ELPH: 61.3 %
ALBUMIN SERPL-MCNC: 3.5 G/DL
ALBUMIN/GLOB SERPL: 1.6 RATIO
ALPHA1 GLOB MFR SERPL ELPH: 7.1 %
ALPHA1 GLOB SERPL ELPH-MCNC: 0.4 G/DL
ALPHA2 GLOB MFR SERPL ELPH: 14.5 %
ALPHA2 GLOB SERPL ELPH-MCNC: 0.8 G/DL
B-GLOBULIN MFR SERPL ELPH: 10.9 %
B-GLOBULIN SERPL ELPH-MCNC: 0.6 G/DL
DEPRECATED KAPPA LC FREE/LAMBDA SER: 0.04 RATIO
GAMMA GLOB FLD ELPH-MCNC: 0.4 G/DL
GAMMA GLOB MFR SERPL ELPH: 6.2 %
IGA SER QL IEP: 48 MG/DL
IGG SER QL IEP: 375 MG/DL
IGM SER QL IEP: <10 MG/DL
INTERPRETATION SERPL IEP-IMP: NORMAL
KAPPA LC CSF-MCNC: 47.76 MG/DL
KAPPA LC SERPL-MCNC: 1.88 MG/DL
M PROTEIN MFR SERPL ELPH: 2.5 %
M PROTEIN SPEC IFE-MCNC: NORMAL
MONOCLON BAND OBS SERPL: 0.1 G/DL
PROT SERPL-MCNC: 5.7 G/DL
PROT SERPL-MCNC: 5.7 G/DL

## 2021-07-02 ENCOUNTER — NON-APPOINTMENT (OUTPATIENT)
Age: 71
End: 2021-07-02

## 2021-07-06 ENCOUNTER — NON-APPOINTMENT (OUTPATIENT)
Age: 71
End: 2021-07-06

## 2021-07-06 ENCOUNTER — LABORATORY RESULT (OUTPATIENT)
Age: 71
End: 2021-07-06

## 2021-07-06 ENCOUNTER — OUTPATIENT (OUTPATIENT)
Dept: OUTPATIENT SERVICES | Facility: HOSPITAL | Age: 71
LOS: 1 days | Discharge: HOME | End: 2021-07-06

## 2021-07-06 ENCOUNTER — APPOINTMENT (OUTPATIENT)
Dept: INFUSION THERAPY | Facility: CLINIC | Age: 71
End: 2021-07-06

## 2021-07-06 DIAGNOSIS — Z51.11 ENCOUNTER FOR ANTINEOPLASTIC CHEMOTHERAPY: ICD-10-CM

## 2021-07-06 DIAGNOSIS — C90.00 MULTIPLE MYELOMA NOT HAVING ACHIEVED REMISSION: ICD-10-CM

## 2021-07-06 LAB
HCT VFR BLD CALC: 29.8 %
HGB BLD-MCNC: 9.2 G/DL
MCHC RBC-ENTMCNC: 26.5 PG
MCHC RBC-ENTMCNC: 30.9 G/DL
MCV RBC AUTO: 85.9 FL
PLATELET # BLD AUTO: 174 K/UL
PMV BLD: 9.7 FL
RBC # BLD: 3.47 M/UL
RBC # FLD: 17.9 %
WBC # FLD AUTO: 6.3 K/UL

## 2021-07-06 RX ORDER — DARATUMUMAB AND HYALURONIDASE-FIHJ (HUMAN RECOMBINANT) 1800; 30000 MG/15ML; U/15ML
15 INJECTION SUBCUTANEOUS ONCE
Refills: 0 | Status: COMPLETED | OUTPATIENT
Start: 2021-07-06 | End: 2021-07-06

## 2021-07-06 RX ADMIN — DARATUMUMAB AND HYALURONIDASE-FIHJ (HUMAN RECOMBINANT) 15 MILLILITER(S): 1800; 30000 INJECTION SUBCUTANEOUS at 09:34

## 2021-08-25 RX ORDER — POMALIDOMIDE 3 MG/1
3 CAPSULE ORAL DAILY
Qty: 21 | Refills: 0 | Status: ACTIVE | COMMUNITY
Start: 2021-02-17 | End: 1900-01-01

## 2021-08-26 ENCOUNTER — NON-APPOINTMENT (OUTPATIENT)
Age: 71
End: 2021-08-26